# Patient Record
Sex: MALE | Race: WHITE | NOT HISPANIC OR LATINO | Employment: OTHER | URBAN - METROPOLITAN AREA
[De-identification: names, ages, dates, MRNs, and addresses within clinical notes are randomized per-mention and may not be internally consistent; named-entity substitution may affect disease eponyms.]

---

## 2017-04-28 ENCOUNTER — LAB VISIT (OUTPATIENT)
Dept: LAB | Facility: HOSPITAL | Age: 67
End: 2017-04-28
Attending: INTERNAL MEDICINE
Payer: COMMERCIAL

## 2017-04-28 DIAGNOSIS — I10 ESSENTIAL HYPERTENSION: ICD-10-CM

## 2017-04-28 LAB
ALBUMIN SERPL BCP-MCNC: 3.4 G/DL
ALP SERPL-CCNC: 100 U/L
ALT SERPL W/O P-5'-P-CCNC: 34 U/L
ANION GAP SERPL CALC-SCNC: 7 MMOL/L
AST SERPL-CCNC: 21 U/L
BILIRUB SERPL-MCNC: 0.6 MG/DL
BUN SERPL-MCNC: 19 MG/DL
CALCIUM SERPL-MCNC: 9.5 MG/DL
CHLORIDE SERPL-SCNC: 106 MMOL/L
CHOLEST/HDLC SERPL: 3.8 {RATIO}
CO2 SERPL-SCNC: 24 MMOL/L
CREAT SERPL-MCNC: 0.9 MG/DL
EST. GFR  (AFRICAN AMERICAN): >60 ML/MIN/1.73 M^2
EST. GFR  (NON AFRICAN AMERICAN): >60 ML/MIN/1.73 M^2
GLUCOSE SERPL-MCNC: 90 MG/DL
HDL/CHOLESTEROL RATIO: 26.1 %
HDLC SERPL-MCNC: 176 MG/DL
HDLC SERPL-MCNC: 46 MG/DL
LDLC SERPL CALC-MCNC: 115.4 MG/DL
NONHDLC SERPL-MCNC: 130 MG/DL
POTASSIUM SERPL-SCNC: 4.4 MMOL/L
PROT SERPL-MCNC: 6.8 G/DL
SODIUM SERPL-SCNC: 137 MMOL/L
TRIGL SERPL-MCNC: 73 MG/DL

## 2017-04-28 PROCEDURE — 80053 COMPREHEN METABOLIC PANEL: CPT

## 2017-04-28 PROCEDURE — 36415 COLL VENOUS BLD VENIPUNCTURE: CPT | Mod: PO

## 2017-04-28 PROCEDURE — 80061 LIPID PANEL: CPT

## 2017-06-06 ENCOUNTER — TELEPHONE (OUTPATIENT)
Dept: INTERNAL MEDICINE | Facility: CLINIC | Age: 67
End: 2017-06-06

## 2017-06-06 NOTE — TELEPHONE ENCOUNTER
----- Message from Kailee Montenegro sent at 6/6/2017  3:55 PM CDT -----  Contact: patient  States he want to know why he is seeing a NP and not Dr. White. Please call patient ASAP @ [phone]. Thanks, satinder

## 2017-06-06 NOTE — TELEPHONE ENCOUNTER
S/w pt. Rescheduled appt from Mrs. Mejía to Dr. White for 06/07/17. Pt was upset that he was scheduled to see Mr.s Mejía and not Dr. White. Rescheduled as requested. Verbalized understanding/TGD

## 2017-06-07 ENCOUNTER — OFFICE VISIT (OUTPATIENT)
Dept: INTERNAL MEDICINE | Facility: CLINIC | Age: 67
End: 2017-06-07
Payer: COMMERCIAL

## 2017-06-07 VITALS
TEMPERATURE: 97 F | DIASTOLIC BLOOD PRESSURE: 74 MMHG | BODY MASS INDEX: 30.25 KG/M2 | HEIGHT: 72 IN | SYSTOLIC BLOOD PRESSURE: 118 MMHG | OXYGEN SATURATION: 97 % | WEIGHT: 223.31 LBS | HEART RATE: 66 BPM

## 2017-06-07 DIAGNOSIS — Z12.11 SCREENING FOR COLON CANCER: ICD-10-CM

## 2017-06-07 DIAGNOSIS — I10 ESSENTIAL HYPERTENSION: Primary | ICD-10-CM

## 2017-06-07 DIAGNOSIS — E78.00 PURE HYPERCHOLESTEROLEMIA: ICD-10-CM

## 2017-06-07 PROCEDURE — 99213 OFFICE O/P EST LOW 20 MIN: CPT | Mod: S$GLB,,, | Performed by: INTERNAL MEDICINE

## 2017-06-07 PROCEDURE — 99999 PR PBB SHADOW E&M-EST. PATIENT-LVL III: CPT | Mod: PBBFAC,,, | Performed by: INTERNAL MEDICINE

## 2017-06-07 RX ORDER — ATORVASTATIN CALCIUM 20 MG/1
20 TABLET, FILM COATED ORAL NIGHTLY
Qty: 90 TABLET | Refills: 3 | Status: SHIPPED | OUTPATIENT
Start: 2017-06-07 | End: 2017-12-06 | Stop reason: SDUPTHER

## 2017-06-07 RX ORDER — TELMISARTAN 80 MG/1
80 TABLET ORAL DAILY
Qty: 90 TABLET | Refills: 3 | Status: SHIPPED | OUTPATIENT
Start: 2017-06-07 | End: 2017-11-01 | Stop reason: SDUPTHER

## 2017-06-07 NOTE — PROGRESS NOTES
HPI:  Patient is a 66-year-old man who comes in today for follow-up of his hypertension, lipids.  He's been doing quite well.  He reports no problems or complaints.  His blood pressures very well controlled at home    Current meds have been verified and updated per the EMR  Exam:/74   Pulse 66   Temp 97.3 °F (36.3 °C) (Tympanic)   Ht 6' (1.829 m)   Wt 101.3 kg (223 lb 5.2 oz)   SpO2 97%   BMI 30.29 kg/m²       Lab Results   Component Value Date    WBC 6.76 10/21/2016    HGB 16.3 10/21/2016    HCT 48.8 10/21/2016     10/21/2016    CHOL 176 04/28/2017    TRIG 73 04/28/2017    HDL 46 04/28/2017    ALT 34 04/28/2017    AST 21 04/28/2017     04/28/2017    K 4.4 04/28/2017     04/28/2017    CREATININE 0.9 04/28/2017    BUN 19 04/28/2017    CO2 24 04/28/2017    TSH 1.217 10/21/2016    PSA 3.8 10/21/2016       Impression:  Hypertension, lipids, both well controlled  Patient Active Problem List   Diagnosis    HTN (hypertension)    Hyperlipemia    History of colon polyps       Plan:  Orders Placed This Encounter    TSH    Comprehensive metabolic panel    Lipid panel    PSA, Screening    telmisartan (MICARDIS) 80 MG Tab    atorvastatin (LIPITOR) 20 MG tablet     Medications remain the same.  He'll be seen again in 6 months with the above lab work.

## 2017-11-01 RX ORDER — TELMISARTAN 80 MG/1
80 TABLET ORAL DAILY
Qty: 90 TABLET | Refills: 3 | Status: SHIPPED | OUTPATIENT
Start: 2017-11-01 | End: 2017-12-06 | Stop reason: SDUPTHER

## 2017-11-30 ENCOUNTER — LAB VISIT (OUTPATIENT)
Dept: LAB | Facility: HOSPITAL | Age: 67
End: 2017-11-30
Attending: INTERNAL MEDICINE
Payer: COMMERCIAL

## 2017-11-30 DIAGNOSIS — Z12.11 SCREENING FOR COLON CANCER: ICD-10-CM

## 2017-11-30 DIAGNOSIS — I10 ESSENTIAL HYPERTENSION: ICD-10-CM

## 2017-11-30 LAB
ALBUMIN SERPL BCP-MCNC: 3.5 G/DL
ALP SERPL-CCNC: 101 U/L
ALT SERPL W/O P-5'-P-CCNC: 35 U/L
ANION GAP SERPL CALC-SCNC: 8 MMOL/L
AST SERPL-CCNC: 23 U/L
BILIRUB SERPL-MCNC: 0.7 MG/DL
BUN SERPL-MCNC: 15 MG/DL
CALCIUM SERPL-MCNC: 9.7 MG/DL
CHLORIDE SERPL-SCNC: 106 MMOL/L
CHOLEST SERPL-MCNC: 195 MG/DL
CHOLEST/HDLC SERPL: 4.1 {RATIO}
CO2 SERPL-SCNC: 28 MMOL/L
COMPLEXED PSA SERPL-MCNC: 1.8 NG/ML
CREAT SERPL-MCNC: 1.1 MG/DL
EST. GFR  (AFRICAN AMERICAN): >60 ML/MIN/1.73 M^2
EST. GFR  (NON AFRICAN AMERICAN): >60 ML/MIN/1.73 M^2
GLUCOSE SERPL-MCNC: 96 MG/DL
HDLC SERPL-MCNC: 48 MG/DL
HDLC SERPL: 24.6 %
LDLC SERPL CALC-MCNC: 133.2 MG/DL
NONHDLC SERPL-MCNC: 147 MG/DL
POTASSIUM SERPL-SCNC: 4.7 MMOL/L
PROT SERPL-MCNC: 7.1 G/DL
SODIUM SERPL-SCNC: 142 MMOL/L
TRIGL SERPL-MCNC: 69 MG/DL
TSH SERPL DL<=0.005 MIU/L-ACNC: 1.09 UIU/ML

## 2017-11-30 PROCEDURE — 84153 ASSAY OF PSA TOTAL: CPT

## 2017-11-30 PROCEDURE — 80053 COMPREHEN METABOLIC PANEL: CPT

## 2017-11-30 PROCEDURE — 36415 COLL VENOUS BLD VENIPUNCTURE: CPT | Mod: PO

## 2017-11-30 PROCEDURE — 84443 ASSAY THYROID STIM HORMONE: CPT

## 2017-11-30 PROCEDURE — 80061 LIPID PANEL: CPT

## 2017-12-06 ENCOUNTER — OFFICE VISIT (OUTPATIENT)
Dept: INTERNAL MEDICINE | Facility: CLINIC | Age: 67
End: 2017-12-06
Payer: COMMERCIAL

## 2017-12-06 VITALS
HEART RATE: 60 BPM | OXYGEN SATURATION: 98 % | WEIGHT: 228.19 LBS | DIASTOLIC BLOOD PRESSURE: 76 MMHG | HEIGHT: 73 IN | TEMPERATURE: 97 F | SYSTOLIC BLOOD PRESSURE: 120 MMHG | BODY MASS INDEX: 30.24 KG/M2 | RESPIRATION RATE: 16 BRPM

## 2017-12-06 DIAGNOSIS — Z00.00 ROUTINE GENERAL MEDICAL EXAMINATION AT A HEALTH CARE FACILITY: Primary | ICD-10-CM

## 2017-12-06 DIAGNOSIS — Z86.010 HISTORY OF COLON POLYPS: ICD-10-CM

## 2017-12-06 DIAGNOSIS — E78.00 PURE HYPERCHOLESTEROLEMIA: ICD-10-CM

## 2017-12-06 DIAGNOSIS — I10 ESSENTIAL HYPERTENSION: ICD-10-CM

## 2017-12-06 PROCEDURE — 99213 OFFICE O/P EST LOW 20 MIN: CPT | Mod: PBBFAC,PO | Performed by: INTERNAL MEDICINE

## 2017-12-06 PROCEDURE — 90670 PCV13 VACCINE IM: CPT | Mod: PBBFAC,PO

## 2017-12-06 PROCEDURE — G0009 ADMIN PNEUMOCOCCAL VACCINE: HCPCS | Mod: PBBFAC,PO

## 2017-12-06 PROCEDURE — 99999 PR PBB SHADOW E&M-EST. PATIENT-LVL III: CPT | Mod: PBBFAC,,, | Performed by: INTERNAL MEDICINE

## 2017-12-06 PROCEDURE — G0008 ADMIN INFLUENZA VIRUS VAC: HCPCS | Mod: PBBFAC,PO

## 2017-12-06 PROCEDURE — 99397 PER PM REEVAL EST PAT 65+ YR: CPT | Mod: 25,S$GLB,, | Performed by: INTERNAL MEDICINE

## 2017-12-06 RX ORDER — TELMISARTAN 80 MG/1
80 TABLET ORAL DAILY
Qty: 90 TABLET | Refills: 3 | Status: SHIPPED | OUTPATIENT
Start: 2017-12-06 | End: 2018-12-07

## 2017-12-06 RX ORDER — ATORVASTATIN CALCIUM 20 MG/1
20 TABLET, FILM COATED ORAL NIGHTLY
Qty: 90 TABLET | Refills: 3 | Status: SHIPPED | OUTPATIENT
Start: 2017-12-06 | End: 2019-09-09 | Stop reason: SDUPTHER

## 2018-05-23 ENCOUNTER — PATIENT OUTREACH (OUTPATIENT)
Dept: ADMINISTRATIVE | Facility: HOSPITAL | Age: 68
End: 2018-05-23

## 2018-05-23 DIAGNOSIS — Z11.59 NEED FOR HEPATITIS C SCREENING TEST: Primary | ICD-10-CM

## 2018-05-30 ENCOUNTER — LAB VISIT (OUTPATIENT)
Dept: LAB | Facility: HOSPITAL | Age: 68
End: 2018-05-30
Attending: INTERNAL MEDICINE
Payer: MEDICARE

## 2018-05-30 DIAGNOSIS — I10 ESSENTIAL HYPERTENSION: ICD-10-CM

## 2018-05-30 DIAGNOSIS — Z11.59 NEED FOR HEPATITIS C SCREENING TEST: ICD-10-CM

## 2018-05-30 LAB
ALBUMIN SERPL BCP-MCNC: 3.6 G/DL
ALP SERPL-CCNC: 102 U/L
ALT SERPL W/O P-5'-P-CCNC: 28 U/L
ANION GAP SERPL CALC-SCNC: 7 MMOL/L
AST SERPL-CCNC: 19 U/L
BILIRUB SERPL-MCNC: 0.8 MG/DL
BUN SERPL-MCNC: 15 MG/DL
CALCIUM SERPL-MCNC: 9.7 MG/DL
CHLORIDE SERPL-SCNC: 108 MMOL/L
CHOLEST SERPL-MCNC: 196 MG/DL
CHOLEST/HDLC SERPL: 4.2 {RATIO}
CO2 SERPL-SCNC: 24 MMOL/L
CREAT SERPL-MCNC: 1 MG/DL
EST. GFR  (AFRICAN AMERICAN): >60 ML/MIN/1.73 M^2
EST. GFR  (NON AFRICAN AMERICAN): >60 ML/MIN/1.73 M^2
GLUCOSE SERPL-MCNC: 96 MG/DL
HDLC SERPL-MCNC: 47 MG/DL
HDLC SERPL: 24 %
LDLC SERPL CALC-MCNC: 128.8 MG/DL
NONHDLC SERPL-MCNC: 149 MG/DL
POTASSIUM SERPL-SCNC: 4.3 MMOL/L
PROT SERPL-MCNC: 7.2 G/DL
SODIUM SERPL-SCNC: 139 MMOL/L
TRIGL SERPL-MCNC: 101 MG/DL

## 2018-05-30 PROCEDURE — 80053 COMPREHEN METABOLIC PANEL: CPT

## 2018-05-30 PROCEDURE — 86803 HEPATITIS C AB TEST: CPT

## 2018-05-30 PROCEDURE — 80061 LIPID PANEL: CPT

## 2018-05-30 PROCEDURE — 36415 COLL VENOUS BLD VENIPUNCTURE: CPT | Mod: PO

## 2018-05-31 LAB — HCV AB SERPL QL IA: NEGATIVE

## 2018-06-06 ENCOUNTER — OFFICE VISIT (OUTPATIENT)
Dept: INTERNAL MEDICINE | Facility: CLINIC | Age: 68
End: 2018-06-06
Payer: MEDICARE

## 2018-06-06 VITALS
SYSTOLIC BLOOD PRESSURE: 146 MMHG | DIASTOLIC BLOOD PRESSURE: 70 MMHG | BODY MASS INDEX: 43.95 KG/M2 | OXYGEN SATURATION: 96 % | WEIGHT: 232.81 LBS | HEIGHT: 61 IN | TEMPERATURE: 98 F

## 2018-06-06 DIAGNOSIS — R53.83 FATIGUE, UNSPECIFIED TYPE: ICD-10-CM

## 2018-06-06 DIAGNOSIS — E78.00 PURE HYPERCHOLESTEROLEMIA: ICD-10-CM

## 2018-06-06 DIAGNOSIS — I10 ESSENTIAL HYPERTENSION: Primary | ICD-10-CM

## 2018-06-06 DIAGNOSIS — R06.02 SOB (SHORTNESS OF BREATH): ICD-10-CM

## 2018-06-06 DIAGNOSIS — Z12.11 SCREEN FOR COLON CANCER: ICD-10-CM

## 2018-06-06 DIAGNOSIS — Z12.5 SCREENING FOR PROSTATE CANCER: ICD-10-CM

## 2018-06-06 DIAGNOSIS — R53.1 WEAKNESS: ICD-10-CM

## 2018-06-06 PROCEDURE — 99214 OFFICE O/P EST MOD 30 MIN: CPT | Mod: S$GLB,,, | Performed by: NURSE PRACTITIONER

## 2018-06-06 PROCEDURE — 3078F DIAST BP <80 MM HG: CPT | Mod: CPTII,S$GLB,, | Performed by: NURSE PRACTITIONER

## 2018-06-06 PROCEDURE — 3008F BODY MASS INDEX DOCD: CPT | Mod: CPTII,S$GLB,, | Performed by: NURSE PRACTITIONER

## 2018-06-06 PROCEDURE — 99999 PR PBB SHADOW E&M-EST. PATIENT-LVL III: CPT | Mod: PBBFAC,,, | Performed by: NURSE PRACTITIONER

## 2018-06-06 PROCEDURE — 3077F SYST BP >= 140 MM HG: CPT | Mod: CPTII,S$GLB,, | Performed by: NURSE PRACTITIONER

## 2018-06-06 NOTE — PROGRESS NOTES
"Subjective:      Patient ID: Geremias Quijano is a 67 y.o. male.    Chief Complaint: No chief complaint on file.    HPI:  Patient is here for a follow up of his hypertension, hyperlipidemia.  He is taking his meds as ordered.  His only concern today is on two different occasions he felt sob unexpectedly.  His father had bypass sx in his 60's.  He is requesting a heart cath to see if he has any blockages.  Really no other symptoms.      Past Medical History:   Diagnosis Date    History of colon polyps     Next colonoscopy due November 2018    HTN (hypertension)     Hyperlipemia        Past Surgical History:   Procedure Laterality Date    COLONOSCOPY N/A 11/13/2015    Procedure: COLONOSCOPY;  Surgeon: Layo Hernandez III, MD;  Location: [facility];  Service: Endoscopy;  Laterality: N/A;    NASAL SEPTUM SURGERY         Lab Results   Component Value Date    WBC 6.76 10/21/2016    HGB 16.3 10/21/2016    HCT 48.8 10/21/2016     10/21/2016    CHOL 196 05/30/2018    TRIG 101 05/30/2018    HDL 47 05/30/2018    ALT 28 05/30/2018    AST 19 05/30/2018     05/30/2018    K 4.3 05/30/2018     05/30/2018    CREATININE 1.0 05/30/2018    BUN 15 05/30/2018    CO2 24 05/30/2018    TSH 1.095 11/30/2017    PSA 1.8 11/30/2017       BP (!) 146/70 (BP Location: Right arm)   Temp 97.7 °F (36.5 °C) (Tympanic)   Ht 5' 1" (1.549 m)   Wt 105.6 kg (232 lb 12.9 oz)   SpO2 96%   BMI 43.99 kg/m²       Review of Systems   Constitutional: Negative for appetite change, chills, diaphoresis and fever.   HENT: Negative for congestion, ear pain, postnasal drip, rhinorrhea, sneezing, sore throat and trouble swallowing.    Eyes: Negative for photophobia, pain and visual disturbance.   Respiratory: Negative for apnea, cough, choking, chest tightness, shortness of breath and wheezing.    Cardiovascular: Negative for chest pain, palpitations and leg swelling.   Gastrointestinal: Negative for abdominal pain, constipation, diarrhea, " nausea and vomiting.   Genitourinary: Negative for decreased urine volume, difficulty urinating, dysuria, hematuria and urgency.   Musculoskeletal: Negative for arthralgias, gait problem, joint swelling and myalgias.   Skin: Negative for rash.   Neurological: Negative for dizziness, tremors, seizures, syncope, weakness, light-headedness, numbness and headaches.   Psychiatric/Behavioral: Negative for agitation, confusion, decreased concentration, hallucinations and sleep disturbance. The patient is not nervous/anxious.       Objective:     Physical Exam   Constitutional: He is oriented to person, place, and time. He appears well-developed and well-nourished. No distress.   Cardiovascular: Normal rate, regular rhythm and normal heart sounds.    Pulmonary/Chest: Effort normal and breath sounds normal.   Musculoskeletal:   Normal gait   Neurological: He is alert and oriented to person, place, and time.   Skin: Skin is warm and dry.   Psychiatric: He has a normal mood and affect. His behavior is normal.     Assessment:      1. Essential hypertension    2. Screen for colon cancer    3. SOB (shortness of breath)    4. Weakness    5. Pure hypercholesterolemia    6. Screening for prostate cancer    7. Fatigue, unspecified type      Plan:   Essential hypertension  -     Comprehensive metabolic panel; Future; Expected date: 06/06/2018    Screen for colon cancer  -     Case request GI: COLONOSCOPY    SOB (shortness of breath)  -     Cardiac treadmill stress test; Future  -     2D Echo w/ Color Flow Doppler; Future    Weakness  -     Cardiac treadmill stress test; Future  -     2D Echo w/ Color Flow Doppler; Future    Pure hypercholesterolemia  -     Lipid panel; Future; Expected date: 06/06/2018  -     TSH; Future; Expected date: 06/06/2018    Screening for prostate cancer  -     PSA, Screening; Future; Expected date: 06/06/2018    Fatigue, unspecified type  -     CBC auto differential; Future; Expected date: 06/06/2018    he  is due for a repeat colonoscopy, will see dr guzman in 6 months for labs and a physical.  Will have a stress test and echo with +family hx and mild s/s per request      Current Outpatient Prescriptions:     atorvastatin (LIPITOR) 20 MG tablet, Take 1 tablet (20 mg total) by mouth every evening., Disp: 90 tablet, Rfl: 3    telmisartan (MICARDIS) 80 MG Tab, Take 1 tablet (80 mg total) by mouth once daily., Disp: 90 tablet, Rfl: 3

## 2018-06-08 RX ORDER — SODIUM, POTASSIUM,MAG SULFATES 17.5-3.13G
SOLUTION, RECONSTITUTED, ORAL ORAL
Qty: 354 ML | Refills: 0 | Status: SHIPPED | OUTPATIENT
Start: 2018-06-08 | End: 2018-08-01

## 2018-06-11 ENCOUNTER — TELEPHONE (OUTPATIENT)
Dept: CARDIOLOGY | Facility: CLINIC | Age: 68
End: 2018-06-11

## 2018-06-11 ENCOUNTER — CLINICAL SUPPORT (OUTPATIENT)
Dept: INTERNAL MEDICINE | Facility: CLINIC | Age: 68
End: 2018-06-11
Payer: MEDICARE

## 2018-06-11 ENCOUNTER — CLINICAL SUPPORT (OUTPATIENT)
Dept: CARDIOLOGY | Facility: CLINIC | Age: 68
End: 2018-06-11
Payer: MEDICARE

## 2018-06-11 VITALS — HEIGHT: 61 IN | BODY MASS INDEX: 43.79 KG/M2 | WEIGHT: 231.94 LBS

## 2018-06-11 DIAGNOSIS — R06.02 SOB (SHORTNESS OF BREATH): ICD-10-CM

## 2018-06-11 DIAGNOSIS — R06.02 SOB (SHORTNESS OF BREATH): Primary | ICD-10-CM

## 2018-06-11 PROCEDURE — 93005 ELECTROCARDIOGRAM TRACING: CPT | Mod: S$GLB,,, | Performed by: NURSE PRACTITIONER

## 2018-06-11 PROCEDURE — 93010 ELECTROCARDIOGRAM REPORT: CPT | Mod: S$GLB,,, | Performed by: INTERNAL MEDICINE

## 2018-06-11 PROCEDURE — 99999 PR PBB SHADOW E&M-EST. PATIENT-LVL I: CPT | Mod: PBBFAC,,,

## 2018-06-11 NOTE — TELEPHONE ENCOUNTER
Cardiology recommends a baseline 12 lead EKG for Mr. Quijano from which to compare Prior to his stress test. If you agree please place the order and schedule. Thank you!

## 2018-06-11 NOTE — TELEPHONE ENCOUNTER
Cardiology is requesting an EKG before his stress test.  Please have him come in for a nurse visit for an EKG   Order is in  thanks

## 2018-06-13 ENCOUNTER — CLINICAL SUPPORT (OUTPATIENT)
Dept: CARDIOLOGY | Facility: CLINIC | Age: 68
End: 2018-06-13
Payer: MEDICARE

## 2018-06-13 DIAGNOSIS — R53.1 WEAKNESS: ICD-10-CM

## 2018-06-13 DIAGNOSIS — R06.02 SOB (SHORTNESS OF BREATH): ICD-10-CM

## 2018-06-13 LAB
DIASTOLIC DYSFUNCTION: NO
DIASTOLIC DYSFUNCTION: NO
ESTIMATED PA SYSTOLIC PRESSURE: 18.84
RETIRED EF AND QEF - SEE NOTES: 65 (ref 55–65)

## 2018-06-13 PROCEDURE — 93306 TTE W/DOPPLER COMPLETE: CPT | Mod: S$GLB,,, | Performed by: NUCLEAR MEDICINE

## 2018-06-13 PROCEDURE — 93015 CV STRESS TEST SUPVJ I&R: CPT | Mod: S$GLB,,, | Performed by: INTERNAL MEDICINE

## 2018-06-14 ENCOUNTER — TELEPHONE (OUTPATIENT)
Dept: INTERNAL MEDICINE | Facility: CLINIC | Age: 68
End: 2018-06-14

## 2018-07-19 ENCOUNTER — PATIENT MESSAGE (OUTPATIENT)
Dept: ENDOSCOPY | Facility: HOSPITAL | Age: 68
End: 2018-07-19

## 2018-08-01 ENCOUNTER — PATIENT MESSAGE (OUTPATIENT)
Dept: INTERNAL MEDICINE | Facility: CLINIC | Age: 68
End: 2018-08-01

## 2018-08-01 ENCOUNTER — PATIENT MESSAGE (OUTPATIENT)
Dept: ENDOSCOPY | Facility: HOSPITAL | Age: 68
End: 2018-08-01

## 2018-08-01 RX ORDER — POLYETHYLENE GLYCOL 3350, SODIUM SULFATE ANHYDROUS, SODIUM BICARBONATE, SODIUM CHLORIDE, POTASSIUM CHLORIDE 236; 22.74; 6.74; 5.86; 2.97 G/4L; G/4L; G/4L; G/4L; G/4L
4 POWDER, FOR SOLUTION ORAL ONCE
Qty: 4000 ML | Refills: 0 | Status: SHIPPED | OUTPATIENT
Start: 2018-08-01 | End: 2018-08-01

## 2018-08-01 NOTE — TELEPHONE ENCOUNTER
This message needs to be sent to the GI department for their approval as they will be doing the colonoscopy

## 2018-08-02 ENCOUNTER — PATIENT MESSAGE (OUTPATIENT)
Dept: GASTROENTEROLOGY | Facility: CLINIC | Age: 68
End: 2018-08-02

## 2018-08-02 RX ORDER — POLYETHYLENE GLYCOL 3350, SODIUM SULFATE ANHYDROUS, SODIUM BICARBONATE, SODIUM CHLORIDE, POTASSIUM CHLORIDE 236; 22.74; 6.74; 5.86; 2.97 G/4L; G/4L; G/4L; G/4L; G/4L
4 POWDER, FOR SOLUTION ORAL ONCE
Qty: 4000 ML | Refills: 0 | Status: SHIPPED | OUTPATIENT
Start: 2018-08-02 | End: 2018-08-02

## 2018-08-15 RX ORDER — ATORVASTATIN CALCIUM 20 MG/1
TABLET, FILM COATED ORAL
Qty: 90 TABLET | Refills: 3 | Status: ON HOLD | OUTPATIENT
Start: 2018-08-15 | End: 2018-08-29 | Stop reason: HOSPADM

## 2018-08-29 ENCOUNTER — ANESTHESIA EVENT (OUTPATIENT)
Dept: ENDOSCOPY | Facility: HOSPITAL | Age: 68
End: 2018-08-29
Payer: MEDICARE

## 2018-08-29 ENCOUNTER — HOSPITAL ENCOUNTER (OUTPATIENT)
Facility: HOSPITAL | Age: 68
Discharge: HOME | End: 2018-08-29
Attending: INTERNAL MEDICINE | Admitting: INTERNAL MEDICINE
Payer: MEDICARE

## 2018-08-29 ENCOUNTER — ANESTHESIA (OUTPATIENT)
Dept: ENDOSCOPY | Facility: HOSPITAL | Age: 68
End: 2018-08-29
Payer: MEDICARE

## 2018-08-29 DIAGNOSIS — K63.5 POLYP OF SIGMOID COLON, UNSPECIFIED TYPE: Primary | ICD-10-CM

## 2018-08-29 DIAGNOSIS — K57.30 DIVERTICULOSIS OF LARGE INTESTINE WITHOUT HEMORRHAGE: ICD-10-CM

## 2018-08-29 DIAGNOSIS — Z86.010 HX OF ADENOMATOUS COLONIC POLYPS: ICD-10-CM

## 2018-08-29 PROBLEM — Z86.0101 HX OF ADENOMATOUS COLONIC POLYPS: Status: ACTIVE | Noted: 2018-08-29

## 2018-08-29 PROCEDURE — 88305 TISSUE EXAM BY PATHOLOGIST: CPT | Performed by: PATHOLOGY

## 2018-08-29 PROCEDURE — 88305 TISSUE EXAM BY PATHOLOGIST: CPT | Mod: 26,,, | Performed by: PATHOLOGY

## 2018-08-29 PROCEDURE — 45380 COLONOSCOPY AND BIOPSY: CPT | Performed by: INTERNAL MEDICINE

## 2018-08-29 PROCEDURE — 27201012 HC FORCEPS, HOT/COLD, DISP: Performed by: INTERNAL MEDICINE

## 2018-08-29 PROCEDURE — 25000003 PHARM REV CODE 250: Performed by: NURSE ANESTHETIST, CERTIFIED REGISTERED

## 2018-08-29 PROCEDURE — 37000008 HC ANESTHESIA 1ST 15 MINUTES: Performed by: INTERNAL MEDICINE

## 2018-08-29 PROCEDURE — 25000003 PHARM REV CODE 250: Performed by: INTERNAL MEDICINE

## 2018-08-29 PROCEDURE — 37000009 HC ANESTHESIA EA ADD 15 MINS: Performed by: INTERNAL MEDICINE

## 2018-08-29 PROCEDURE — 45380 COLONOSCOPY AND BIOPSY: CPT | Mod: PT,,, | Performed by: INTERNAL MEDICINE

## 2018-08-29 PROCEDURE — 63600175 PHARM REV CODE 636 W HCPCS: Performed by: NURSE ANESTHETIST, CERTIFIED REGISTERED

## 2018-08-29 RX ORDER — LIDOCAINE HYDROCHLORIDE 20 MG/ML
INJECTION, SOLUTION EPIDURAL; INFILTRATION; INTRACAUDAL; PERINEURAL
Status: DISCONTINUED | OUTPATIENT
Start: 2018-08-29 | End: 2018-08-29

## 2018-08-29 RX ORDER — SODIUM CHLORIDE, SODIUM LACTATE, POTASSIUM CHLORIDE, CALCIUM CHLORIDE 600; 310; 30; 20 MG/100ML; MG/100ML; MG/100ML; MG/100ML
INJECTION, SOLUTION INTRAVENOUS CONTINUOUS
Status: DISCONTINUED | OUTPATIENT
Start: 2018-08-29 | End: 2018-08-29 | Stop reason: HOSPADM

## 2018-08-29 RX ORDER — PROPOFOL 10 MG/ML
VIAL (ML) INTRAVENOUS
Status: DISCONTINUED | OUTPATIENT
Start: 2018-08-29 | End: 2018-08-29

## 2018-08-29 RX ORDER — SODIUM CHLORIDE 0.9 % (FLUSH) 0.9 %
3 SYRINGE (ML) INJECTION
Status: DISCONTINUED | OUTPATIENT
Start: 2018-08-29 | End: 2018-08-29 | Stop reason: HOSPADM

## 2018-08-29 RX ADMIN — PROPOFOL 30 MG: 10 INJECTION, EMULSION INTRAVENOUS at 07:08

## 2018-08-29 RX ADMIN — PROPOFOL 100 MG: 10 INJECTION, EMULSION INTRAVENOUS at 07:08

## 2018-08-29 RX ADMIN — PROPOFOL 150 MG: 10 INJECTION, EMULSION INTRAVENOUS at 07:08

## 2018-08-29 RX ADMIN — LIDOCAINE HYDROCHLORIDE 40 MG: 20 INJECTION, SOLUTION EPIDURAL; INFILTRATION; INTRACAUDAL; PERINEURAL at 07:08

## 2018-08-29 RX ADMIN — SODIUM CHLORIDE, SODIUM LACTATE, POTASSIUM CHLORIDE, AND CALCIUM CHLORIDE: .6; .31; .03; .02 INJECTION, SOLUTION INTRAVENOUS at 07:08

## 2018-08-29 NOTE — DISCHARGE INSTRUCTIONS

## 2018-08-29 NOTE — H&P
Short Stay Endoscopy History and Physical    PCP - Martínez White MD    Procedure - Colonoscopy  ASA - 2  Mallampati - per anesthesia  History of Anesthesia problems - no  Family history Anesthesia problems -  no     HPI:  This is a 67 y.o.male here for evaluation of :Hx of Colon Polyps     Reflux - no  Dysphagia - no  Abdominal pain - no  Diarrhea - no  Anemia - no  GI bleeding - no  Nausea and vomiting-no  Early satiety-no  aversion to sight or smell of food-no    ROS:  Constitutional: No fevers, chills, No weight loss  ENT: No allergies  CV: No chest pain  Pulm: No cough, No shortness of breath  Ophtho: No vision changes  GI: see HPI  Derm: No rash  Heme: No lymphadenopathy, No bruising  MSK: No arthritis  : No dysuria, No hematuria  Endo: No hot or cold intolerance  Neuro: No syncope, No seizure  Psych: No anxiety, No depression    Medical History:  Past Medical History:   Diagnosis Date    History of colon polyps     Next colonoscopy due November 2018    HTN (hypertension)     Hyperlipemia        Surgical History:  Past Surgical History:   Procedure Laterality Date    NASAL SEPTUM SURGERY         Family History:History reviewed. No pertinent family history.    Social History:  Social History     Socioeconomic History    Marital status:      Spouse name: Not on file    Number of children: Not on file    Years of education: Not on file    Highest education level: Not on file   Social Needs    Financial resource strain: Not on file    Food insecurity - worry: Not on file    Food insecurity - inability: Not on file    Transportation needs - medical: Not on file    Transportation needs - non-medical: Not on file   Occupational History    Not on file   Tobacco Use    Smoking status: Never Smoker    Smokeless tobacco: Never Used   Substance and Sexual Activity    Alcohol use: Yes    Drug use: No    Sexual activity: Yes     Partners: Female   Other Topics Concern    Not on file   Social  History Narrative    Not on file       Allergies: Review of patient's allergies indicates:  No Known Allergies    Medications:   No current facility-administered medications on file prior to encounter.      Current Outpatient Medications on File Prior to Encounter   Medication Sig Dispense Refill    atorvastatin (LIPITOR) 20 MG tablet Take 1 tablet (20 mg total) by mouth every evening. 90 tablet 3    telmisartan (MICARDIS) 80 MG Tab Take 1 tablet (80 mg total) by mouth once daily. 90 tablet 3       Objective Findings:    Vital Signs:There were no vitals filed for this visit.        Physical Exam:  General Appearance: Well appearing in no acute distress  Eyes:    No scleral icterus  ENT: Neck supple, Lips, mucosa, and tongue normal; teeth and gums normal  Lungs: CTA bilaterally in anterior and posterior fields, no wheezes, no crackles.  Heart:  Regular rate, S1, S2 normal, no murmurs heard.  Abdomen: Soft, non tender, non distended with normal bowel sounds. No hepatosplenomegaly, ascites, or mass.  Extremities: No clubbing, cyanosis or edema  Skin: No rash    Labs:  Reviewed    Plan: Colonoscopy  I have explained the risks and benefits of endoscopy procedures to the patient including but not limited to bleeding, perforation, infection, and death. The patient wishes to proceed.

## 2018-08-29 NOTE — ANESTHESIA PREPROCEDURE EVALUATION
08/29/2018  Geremias Quijano is a 67 y.o., male.    Anesthesia Evaluation    I have reviewed the Patient Summary Reports.    I have reviewed the Nursing Notes.   I have reviewed the Medications.     Review of Systems  Anesthesia Hx:  Denies Family Hx of Anesthesia complications.   Denies Personal Hx of Anesthesia complications.   Social:  Smoker    Cardiovascular:   Exercise tolerance: good Hypertension, well controlled Denies MI.  Denies CAD.    Denies Dysrhythmias.  hyperlipidemia    Pulmonary:  Pulmonary Normal    Renal/:  Renal/ Normal     Hepatic/GI:  Hepatic/GI Normal Bowel Prep. Last prep    Musculoskeletal:  Musculoskeletal Normal    Neurological:  Neurology Normal        Physical Exam  General:  Well nourished    Airway/Jaw/Neck:  Airway Findings: Mouth Opening: Normal Mallampati: III      Dental:  Dental Findings: In tact   Chest/Lungs:  Chest/Lungs Findings: Clear to auscultation     Heart/Vascular:  Heart Findings: Rate: Normal  Rhythm: Regular Rhythm  Sounds: Normal             Anesthesia Plan  Type of Anesthesia, risks & benefits discussed:  Anesthesia Type:  MAC  Patient's Preference:   Intra-op Monitoring Plan:   Intra-op Monitoring Plan Comments:   Post Op Pain Control Plan:   Post Op Pain Control Plan Comments:   Induction:    Beta Blocker:  Patient is not currently on a Beta-Blocker (No further documentation required).       Informed Consent: Patient understands risks and agrees with Anesthesia plan.  Questions answered. Anesthesia consent signed with patient.  ASA Score: 2     Day of Surgery Review of History & Physical: I have interviewed and examined the patient. I have reviewed the patient's H&P dated:  There are no significant changes.          Ready For Surgery From Anesthesia Perspective.

## 2018-08-29 NOTE — [employer] PATIENT INSTRUCTIONS
Discharge Summary/Instructions after an Endoscopic Procedure  Patient Name: Geremias Quijano  Patient MRN: [id number]  Patient YOB: 1950  Wednesday, August 29, 2018 Layo Hernandez III, MD  RESTRICTIONS:  During your procedure today, you received medications for sedation.  These   medications may affect your judgment, balance and coordination.  Therefore,   for 24 hours, you have the following restrictions:   - DO NOT drive a car, operate machinery, make legal/financial decisions,   sign important papers or drink alcohol.    ACTIVITY:  Today: no heavy lifting, straining or running due to procedural   sedation/anesthesia.  The following day: return to full activity including work.  DIET:  Eat and drink normally unless instructed otherwise.     TREATMENT FOR COMMON SIDE EFFECTS:  - Mild abdominal pain, nausea, belching, bloating or excessive gas:  rest,   eat lightly and use a heating pad.  - Sore Throat: treat with throat lozenges and/or gargle with warm salt   water.  - Because air was used during the procedure, expelling large amounts of air   from your rectum or belching is normal.  - If a bowel prep was taken, you may not have a bowel movement for 1-3 days.    This is normal.  SYMPTOMS TO WATCH FOR AND REPORT TO YOUR PHYSICIAN:  1. Abdominal pain or bloating, other than gas cramps.  2. Chest pain.  3. Back pain.  4. Signs of infection such as: chills or fever occurring within 24 hours   after the procedure.  5. Rectal bleeding, which would show as bright red, maroon, or black stools.   (A tablespoon of blood from the rectum is not serious, especially if   hemorrhoids are present.)  6. Vomiting.  7. Weakness or dizziness.  GO DIRECTLY TO THE NEAREST EMERGENCY ROOM IF YOU HAVE ANY OF THE FOLLOWING:      Difficulty breathing              Chills and/or fever over 101 F   Persistent vomiting and/or vomiting blood   Severe abdominal pain   Severe chest pain   Black, tarry stools   Bleeding- more than one  tablespoon   Any other symptom or condition that you feel may need urgent attention  Your doctor recommends these additional instructions:  If any biopsies were taken, your doctors clinic will contact you in 1 to 2   weeks with any results.  - Discharge patient to home (via wheelchair).   - High fiber diet.   - Continue present medications.   - Await pathology results.   - Repeat colonoscopy in 5 years for surveillance.   - Return to primary care physician as previously scheduled.   - Discharge patient to home (via wheelchair).   - High fiber diet.   - Continue present medications.   - Await pathology results.   - Repeat colonoscopy in 5 years for surveillance.   - Return to primary care physician as previously scheduled.  For questions, problems or results please call your physician Layo Hernandez III, MD at Work:  [phone]  If you have any questions about the above instructions, call the GI   department at [phone] or call the endoscopy unit at [phone]   from 7am until 3 pm.  [facility] [place], EMERGENCY ROOM PHONE NUMBER:   [phone]  IF A COMPLICATION OR EMERGENCY SITUATION ARISES AND YOU ARE UNABLE TO REACH   YOUR PHYSICIAN - GO DIRECTLY TO THE EMERGENCY ROOM.  I have read or have had read to me these discharge instructions for my   procedure and have received a written copy.  I understand these   instructions and will follow-up with my physician if I have any questions.     __________________________________       _____________________________________  Nurse Signature                                          Patient/Designated   Responsible Party Signature  Layo Hernandez III, MD  8/29/2018 7:39:07 AM  This report has been verified and signed electronically.  [employer]

## 2018-08-29 NOTE — PLAN OF CARE
See anesthesia for meds, vs, and monitoring.Pt adequately sedated per all staff in procedure room. Final timeout done

## 2018-08-29 NOTE — PLAN OF CARE
Dr Hernandez came to bedside and discussed findings. NO N/V,  no abdominal pain, no GI bleeding, and vitals stable.  Pt discharged from unit.

## 2018-08-29 NOTE — INTERVAL H&P NOTE
The patient has been examined and the H&P has been reviewed:I have reviewed this note and I agree with this assessment. The patient remains stable for endoscopy at the time of this present evaluation.         Anesthesia/Surgery risks, benefits and alternative options discussed and understood by patient/family.          Active Hospital Problems    Diagnosis  POA    Hx of adenomatous colonic polyps [Z86.010]  Not Applicable      Resolved Hospital Problems   No resolved problems to display.

## 2018-08-29 NOTE — TRANSFER OF CARE
Anesthesia Transfer of Care Note    Patient: Geremias Quijano    Procedure(s) Performed: Procedure(s) (LRB):  COLONOSCOPY (N/A)    Patient location: PACU    Anesthesia Type: MAC    Transport from OR: Transported from OR on room air with adequate spontaneous ventilation    Post pain: adequate analgesia    Post assessment: no apparent anesthetic complications    Post vital signs: stable    Level of consciousness: awake and responds to stimulation    Nausea/Vomiting: no nausea/vomiting    Complications: none    Transfer of care protocol was followed      Last vitals:   Visit Vitals  BP 95/62   Pulse 68   Temp 36.5 °C (97.7 °F) (Oral)   Resp 14   Ht 6' (1.829 m)   Wt 100.2 kg (221 lb)   SpO2 95%   BMI 29.97 kg/m²

## 2018-08-29 NOTE — DISCHARGE SUMMARY
[facility] -   Brief Operative Note     SUMMARY     Surgery Date: 8/29/2018     Surgeon(s) and Role:     * Layo Hernandez III, MD - Primary    Assisting Surgeon: None    Pre-op Diagnosis:  Screen for colon cancer [Z12.11]    Post-op Diagnosis:  Post-Op Diagnosis Codes:     * Screen for colon cancer [Z12.11]     - Colon polyps     - Diverticulosis  Procedure(s) (LRB):  COLONOSCOPY (N/A)    Anesthesia: Choice    Description of the findings of the procedure: Procedures completed. See Procedure note for full details.    Findings/Key Components: Procedures completed. See Procedure note for full details.    Prosthetics/Devices: None    Estimated Blood Loss: * No values recorded between 8/29/2018 12:00 AM and 8/29/2018  7:42 AM *         Specimens:   Specimen (12h ago, onward)    Start     Ordered    08/29/18 0729  Specimen to Pathology - Surgery  Once     Comments:  Jar 1 Sigmoid polyps     Start Status   08/29/18 0729 Collected (08/29/18 0734)       08/29/18 0729          Discharge Note    SUMMARY     Admit Date: 8/29/2018    Discharge Date and Time: 8/29/2018    Hospital Course (synopsis of major diagnoses, care, treatment, and services provided during the course of the hospital stay):  Procedures completed. See Procedure note for full details. Discharge patient when discharge criteria met.    Final Diagnosis: Post-Op Diagnosis Codes:     * Screen for colon cancer [Z12.11]     - Colon polyps     - Diverticulosis  Disposition: Discharge patient when discharge criteria met.    Follow Up/Patient Instructions:       Medications:  Reconciled Home Medications:   Current Discharge Medication List      CONTINUE these medications which have NOT CHANGED    Details   atorvastatin (LIPITOR) 20 MG tablet Take 1 tablet (20 mg total) by mouth every evening.  Qty: 90 tablet, Refills: 3      telmisartan (MICARDIS) 80 MG Tab Take 1 tablet (80 mg total) by mouth once daily.  Qty: 90 tablet, Refills: 3            Discharge  Procedure Orders   Diet general     Activity as tolerated

## 2018-08-29 NOTE — ANESTHESIA POSTPROCEDURE EVALUATION
Anesthesia Post Evaluation    Patient: Geremias Quijano    Procedure(s) Performed: Procedure(s) (LRB):  COLONOSCOPY (N/A)    Final Anesthesia Type: MAC  Patient location during evaluation: PACU  Patient participation: Yes- Able to Participate  Level of consciousness: awake and alert and oriented  Post-procedure vital signs: reviewed and stable  Pain management: adequate  Airway patency: patent  PONV status at discharge: No PONV  Anesthetic complications: no      Cardiovascular status: blood pressure returned to baseline, hemodynamically stable and stable  Respiratory status: unassisted, spontaneous ventilation and room air  Hydration status: euvolemic  Follow-up not needed.        Visit Vitals  BP 95/62   Pulse 68   Temp 36.5 °C (97.7 °F) (Oral)   Resp 14   Ht 6' (1.829 m)   Wt 100.2 kg (221 lb)   SpO2 95%   BMI 29.97 kg/m²       Pain/Jeremías Score: Pain Assessment Performed: Yes (8/29/2018  7:34 AM)  Presence of Pain: denies (8/29/2018  7:34 AM)

## 2018-08-30 VITALS
OXYGEN SATURATION: 97 % | HEIGHT: 72 IN | RESPIRATION RATE: 17 BRPM | WEIGHT: 221 LBS | SYSTOLIC BLOOD PRESSURE: 132 MMHG | TEMPERATURE: 98 F | BODY MASS INDEX: 29.93 KG/M2 | HEART RATE: 72 BPM | DIASTOLIC BLOOD PRESSURE: 74 MMHG

## 2018-08-31 ENCOUNTER — PATIENT MESSAGE (OUTPATIENT)
Dept: GASTROENTEROLOGY | Facility: CLINIC | Age: 68
End: 2018-08-31

## 2018-11-06 RX ORDER — TELMISARTAN 80 MG/1
TABLET ORAL
Qty: 90 TABLET | Refills: 3 | Status: SHIPPED | OUTPATIENT
Start: 2018-11-06 | End: 2018-11-06 | Stop reason: SDUPTHER

## 2018-11-06 RX ORDER — TELMISARTAN 80 MG/1
80 TABLET ORAL DAILY
Qty: 90 TABLET | Refills: 3 | Status: SHIPPED | OUTPATIENT
Start: 2018-11-06 | End: 2019-11-15 | Stop reason: SDUPTHER

## 2018-11-30 ENCOUNTER — LAB VISIT (OUTPATIENT)
Dept: LAB | Facility: HOSPITAL | Age: 68
End: 2018-11-30
Attending: INTERNAL MEDICINE
Payer: MEDICARE

## 2018-11-30 DIAGNOSIS — E78.00 PURE HYPERCHOLESTEROLEMIA: ICD-10-CM

## 2018-11-30 DIAGNOSIS — I10 ESSENTIAL HYPERTENSION: ICD-10-CM

## 2018-11-30 DIAGNOSIS — R53.83 FATIGUE, UNSPECIFIED TYPE: ICD-10-CM

## 2018-11-30 DIAGNOSIS — Z12.5 SCREENING FOR PROSTATE CANCER: ICD-10-CM

## 2018-11-30 LAB
ALBUMIN SERPL BCP-MCNC: 3.6 G/DL
ALP SERPL-CCNC: 113 U/L
ALT SERPL W/O P-5'-P-CCNC: 29 U/L
ANION GAP SERPL CALC-SCNC: 7 MMOL/L
AST SERPL-CCNC: 19 U/L
BASOPHILS # BLD AUTO: 0.08 K/UL
BASOPHILS NFR BLD: 1.3 %
BILIRUB SERPL-MCNC: 0.6 MG/DL
BUN SERPL-MCNC: 18 MG/DL
CALCIUM SERPL-MCNC: 9.9 MG/DL
CHLORIDE SERPL-SCNC: 104 MMOL/L
CHOLEST SERPL-MCNC: 199 MG/DL
CHOLEST/HDLC SERPL: 4.1 {RATIO}
CO2 SERPL-SCNC: 27 MMOL/L
COMPLEXED PSA SERPL-MCNC: 3.5 NG/ML
CREAT SERPL-MCNC: 0.9 MG/DL
DIFFERENTIAL METHOD: NORMAL
EOSINOPHIL # BLD AUTO: 0.2 K/UL
EOSINOPHIL NFR BLD: 3.6 %
ERYTHROCYTE [DISTWIDTH] IN BLOOD BY AUTOMATED COUNT: 12.3 %
EST. GFR  (AFRICAN AMERICAN): >60 ML/MIN/1.73 M^2
EST. GFR  (NON AFRICAN AMERICAN): >60 ML/MIN/1.73 M^2
GLUCOSE SERPL-MCNC: 95 MG/DL
HCT VFR BLD AUTO: 49.7 %
HDLC SERPL-MCNC: 49 MG/DL
HDLC SERPL: 24.6 %
HGB BLD-MCNC: 16.2 G/DL
IMM GRANULOCYTES # BLD AUTO: 0.02 K/UL
IMM GRANULOCYTES NFR BLD AUTO: 0.3 %
LDLC SERPL CALC-MCNC: 130.4 MG/DL
LYMPHOCYTES # BLD AUTO: 1.4 K/UL
LYMPHOCYTES NFR BLD: 23.3 %
MCH RBC QN AUTO: 28.5 PG
MCHC RBC AUTO-ENTMCNC: 32.6 G/DL
MCV RBC AUTO: 87 FL
MONOCYTES # BLD AUTO: 0.5 K/UL
MONOCYTES NFR BLD: 8.7 %
NEUTROPHILS # BLD AUTO: 3.9 K/UL
NEUTROPHILS NFR BLD: 62.8 %
NONHDLC SERPL-MCNC: 150 MG/DL
NRBC BLD-RTO: 0 /100 WBC
PLATELET # BLD AUTO: 284 K/UL
PMV BLD AUTO: 10.6 FL
POTASSIUM SERPL-SCNC: 4.5 MMOL/L
PROT SERPL-MCNC: 7.4 G/DL
RBC # BLD AUTO: 5.69 M/UL
SODIUM SERPL-SCNC: 138 MMOL/L
TRIGL SERPL-MCNC: 98 MG/DL
TSH SERPL DL<=0.005 MIU/L-ACNC: 1.43 UIU/ML
WBC # BLD AUTO: 6.19 K/UL

## 2018-11-30 PROCEDURE — 80053 COMPREHEN METABOLIC PANEL: CPT | Mod: HCNC

## 2018-11-30 PROCEDURE — 80061 LIPID PANEL: CPT | Mod: HCNC

## 2018-11-30 PROCEDURE — 84153 ASSAY OF PSA TOTAL: CPT | Mod: HCNC

## 2018-11-30 PROCEDURE — 85025 COMPLETE CBC W/AUTO DIFF WBC: CPT | Mod: HCNC

## 2018-11-30 PROCEDURE — 36415 COLL VENOUS BLD VENIPUNCTURE: CPT | Mod: HCNC,PO

## 2018-11-30 PROCEDURE — 84443 ASSAY THYROID STIM HORMONE: CPT | Mod: HCNC

## 2018-12-07 ENCOUNTER — OFFICE VISIT (OUTPATIENT)
Dept: INTERNAL MEDICINE | Facility: CLINIC | Age: 68
End: 2018-12-07
Payer: MEDICARE

## 2018-12-07 VITALS
BODY MASS INDEX: 32.85 KG/M2 | RESPIRATION RATE: 18 BRPM | TEMPERATURE: 97 F | OXYGEN SATURATION: 95 % | SYSTOLIC BLOOD PRESSURE: 154 MMHG | WEIGHT: 242.5 LBS | HEIGHT: 72 IN | DIASTOLIC BLOOD PRESSURE: 72 MMHG | HEART RATE: 90 BPM

## 2018-12-07 DIAGNOSIS — E78.00 PURE HYPERCHOLESTEROLEMIA: ICD-10-CM

## 2018-12-07 DIAGNOSIS — Z00.00 ROUTINE GENERAL MEDICAL EXAMINATION AT A HEALTH CARE FACILITY: Primary | ICD-10-CM

## 2018-12-07 DIAGNOSIS — Z86.010 HISTORY OF COLON POLYPS: ICD-10-CM

## 2018-12-07 DIAGNOSIS — I10 ESSENTIAL HYPERTENSION: ICD-10-CM

## 2018-12-07 PROCEDURE — 99397 PER PM REEVAL EST PAT 65+ YR: CPT | Mod: 25,HCNC,S$GLB, | Performed by: INTERNAL MEDICINE

## 2018-12-07 PROCEDURE — 3078F DIAST BP <80 MM HG: CPT | Mod: CPTII,HCNC,S$GLB, | Performed by: INTERNAL MEDICINE

## 2018-12-07 PROCEDURE — 99999 PR PBB SHADOW E&M-EST. PATIENT-LVL III: CPT | Mod: PBBFAC,HCNC,, | Performed by: INTERNAL MEDICINE

## 2018-12-07 PROCEDURE — 3077F SYST BP >= 140 MM HG: CPT | Mod: CPTII,HCNC,S$GLB, | Performed by: INTERNAL MEDICINE

## 2018-12-07 RX ORDER — MELOXICAM 15 MG/1
TABLET ORAL
COMMUNITY
Start: 2018-12-03 | End: 2019-06-05

## 2018-12-07 NOTE — PROGRESS NOTES
HPI:  Patient is a 68-year-old man who comes today for follow-up of hypertension, lipids and for his annual physical.  He has not been recently checking his blood pressure.  Patient denies any new problems or complaints.      Current MEDS: medcard review, verified and update  Allergies: Per the electronic medical record    Past Medical History:   Diagnosis Date    History of colon polyps     Next colonoscopy due November 2018    HTN (hypertension)     Hyperlipemia        Past Surgical History:   Procedure Laterality Date    COLONOSCOPY N/A 11/13/2015    Procedure: COLONOSCOPY;  Surgeon: Layo Hernandez III, MD;  Location: [facility];  Service: Endoscopy;  Laterality: N/A;    COLONOSCOPY N/A 8/29/2018    Procedure: COLONOSCOPY;  Surgeon: Layo Hernandez III, MD;  Location: [facility];  Service: Endoscopy;  Laterality: N/A;    COLONOSCOPY N/A 8/29/2018    Performed by Layo Hernandez III, MD at [facility]    COLONOSCOPY N/A 11/13/2015    Performed by Layo Hernandez III, MD at [facility]    NASAL SEPTUM SURGERY         SHx: per the electronic medical record    FHx: recorded in the electronic medical record    ROS:    denies any chest pains or shortness of breath. Denies any nausea, vomiting or diarrhea. Denies any fever, chills or sweats. Denies any change in weight, voice, stool, skin or hair. Denies any dysuria, dyspepsia or dysphagia. Denies any change in vision, hearing or headaches. Denies any swollen lymph nodes or loss of memory.    PE:  BP (!) 154/72 (BP Location: Left arm, Patient Position: Sitting, BP Method: Large (Automatic))   Pulse 90   Temp 97.4 °F (36.3 °C) (Tympanic)   Resp 18   Ht 6' (1.829 m)   Wt 110 kg (242 lb 8.1 oz)   SpO2 95%   BMI 32.89 kg/m²   Gen: Well-developed, well-nourished, male, in no acute distress, oriented x3  HEENT: neck is supple, no adenopathy, carotids 2+ equal without bruits, thyroid exam normal size without nodules.  CHEST: clear to auscultation and  percussion  CVS: regular rate and rhythm without significant murmur, gallop, or rubs  ABD: soft, benign, no rebound no guarding, no distention.  Bowel sounds are normal.     nontender.  No palpable masses.  No organomegaly and no audible bruits.  RECTAL: no masses.  Prostate 30  Grams without nodules.  EXT: no clubbing, cyanosis, or edema  LYMPH: no cervical, inguinal, or axillary adenopathy  FEET: no loss of sensation.  No ulcers or pressure sores.  NEURO: gait normal.  Cranial nerves II- XII intact. No nystagmus.  Speech normal.   Gross motor and sensory unremarkable.    Lab Results   Component Value Date    WBC 6.19 11/30/2018    HGB 16.2 11/30/2018    HCT 49.7 11/30/2018     11/30/2018    CHOL 199 11/30/2018    TRIG 98 11/30/2018    HDL 49 11/30/2018    ALT 29 11/30/2018    AST 19 11/30/2018     11/30/2018    K 4.5 11/30/2018     11/30/2018    CREATININE 0.9 11/30/2018    BUN 18 11/30/2018    CO2 27 11/30/2018    TSH 1.434 11/30/2018    PSA 3.5 11/30/2018       Impression:  Elevated blood pressure today.  Other medical problems below, stable  Patient Active Problem List   Diagnosis    HTN (hypertension)    Hyperlipemia    History of colon polyps       Plan:   Orders Placed This Encounter    Lipid panel    Comprehensive metabolic panel     He will start checking his blood pressure 2 to 3 times a week and record the results and send them to me via the electronic record within about a month.  He will otherwise be seen by the nurse practitioner 6 months with above lab work.

## 2018-12-20 ENCOUNTER — PATIENT MESSAGE (OUTPATIENT)
Dept: INTERNAL MEDICINE | Facility: CLINIC | Age: 68
End: 2018-12-20

## 2019-05-22 ENCOUNTER — PATIENT OUTREACH (OUTPATIENT)
Dept: ADMINISTRATIVE | Facility: HOSPITAL | Age: 69
End: 2019-05-22

## 2019-05-31 ENCOUNTER — LAB VISIT (OUTPATIENT)
Dept: LAB | Facility: HOSPITAL | Age: 69
End: 2019-05-31
Attending: INTERNAL MEDICINE
Payer: MEDICARE

## 2019-05-31 DIAGNOSIS — I10 ESSENTIAL HYPERTENSION: ICD-10-CM

## 2019-05-31 LAB
ALBUMIN SERPL BCP-MCNC: 3.6 G/DL (ref 3.5–5.2)
ALP SERPL-CCNC: 125 U/L (ref 55–135)
ALT SERPL W/O P-5'-P-CCNC: 23 U/L (ref 10–44)
ANION GAP SERPL CALC-SCNC: 10 MMOL/L (ref 8–16)
AST SERPL-CCNC: 19 U/L (ref 10–40)
BILIRUB SERPL-MCNC: 0.9 MG/DL (ref 0.1–1)
BUN SERPL-MCNC: 16 MG/DL (ref 8–23)
CALCIUM SERPL-MCNC: 10.3 MG/DL (ref 8.7–10.5)
CHLORIDE SERPL-SCNC: 106 MMOL/L (ref 95–110)
CHOLEST SERPL-MCNC: 197 MG/DL (ref 120–199)
CHOLEST/HDLC SERPL: 4.2 {RATIO} (ref 2–5)
CO2 SERPL-SCNC: 26 MMOL/L (ref 23–29)
CREAT SERPL-MCNC: 1.1 MG/DL (ref 0.5–1.4)
EST. GFR  (AFRICAN AMERICAN): >60 ML/MIN/1.73 M^2
EST. GFR  (NON AFRICAN AMERICAN): >60 ML/MIN/1.73 M^2
GLUCOSE SERPL-MCNC: 94 MG/DL (ref 70–110)
HDLC SERPL-MCNC: 47 MG/DL (ref 40–75)
HDLC SERPL: 23.9 % (ref 20–50)
LDLC SERPL CALC-MCNC: 125.4 MG/DL (ref 63–159)
NONHDLC SERPL-MCNC: 150 MG/DL
POTASSIUM SERPL-SCNC: 5.2 MMOL/L (ref 3.5–5.1)
PROT SERPL-MCNC: 7.4 G/DL (ref 6–8.4)
SODIUM SERPL-SCNC: 142 MMOL/L (ref 136–145)
TRIGL SERPL-MCNC: 123 MG/DL (ref 30–150)

## 2019-05-31 PROCEDURE — 80053 COMPREHEN METABOLIC PANEL: CPT | Mod: HCNC

## 2019-05-31 PROCEDURE — 80061 LIPID PANEL: CPT | Mod: HCNC

## 2019-05-31 PROCEDURE — 36415 COLL VENOUS BLD VENIPUNCTURE: CPT | Mod: HCNC,PO

## 2019-06-05 ENCOUNTER — OFFICE VISIT (OUTPATIENT)
Dept: INTERNAL MEDICINE | Facility: CLINIC | Age: 69
End: 2019-06-05
Payer: MEDICARE

## 2019-06-05 VITALS
DIASTOLIC BLOOD PRESSURE: 64 MMHG | OXYGEN SATURATION: 97 % | SYSTOLIC BLOOD PRESSURE: 124 MMHG | WEIGHT: 238.13 LBS | BODY MASS INDEX: 33.34 KG/M2 | TEMPERATURE: 98 F | HEART RATE: 69 BPM | HEIGHT: 71 IN

## 2019-06-05 DIAGNOSIS — R53.83 FATIGUE, UNSPECIFIED TYPE: ICD-10-CM

## 2019-06-05 DIAGNOSIS — I10 ESSENTIAL HYPERTENSION: Primary | ICD-10-CM

## 2019-06-05 DIAGNOSIS — Z00.00 ROUTINE CHECK-UP: ICD-10-CM

## 2019-06-05 DIAGNOSIS — M19.90 OSTEOARTHRITIS, UNSPECIFIED OSTEOARTHRITIS TYPE, UNSPECIFIED SITE: ICD-10-CM

## 2019-06-05 DIAGNOSIS — E78.00 PURE HYPERCHOLESTEROLEMIA: ICD-10-CM

## 2019-06-05 DIAGNOSIS — Z12.5 SCREENING FOR PROSTATE CANCER: ICD-10-CM

## 2019-06-05 PROCEDURE — 99214 OFFICE O/P EST MOD 30 MIN: CPT | Mod: 25,HCNC,S$GLB, | Performed by: NURSE PRACTITIONER

## 2019-06-05 PROCEDURE — 3078F DIAST BP <80 MM HG: CPT | Mod: HCNC,CPTII,S$GLB, | Performed by: NURSE PRACTITIONER

## 2019-06-05 PROCEDURE — 99999 PR PBB SHADOW E&M-EST. PATIENT-LVL III: CPT | Mod: PBBFAC,HCNC,, | Performed by: NURSE PRACTITIONER

## 2019-06-05 PROCEDURE — 1101F PT FALLS ASSESS-DOCD LE1/YR: CPT | Mod: HCNC,CPTII,S$GLB, | Performed by: NURSE PRACTITIONER

## 2019-06-05 PROCEDURE — 90732 PNEUMOCOCCAL POLYSACCHARIDE VACCINE 23-VALENT =>2YO SQ IM: ICD-10-PCS | Mod: HCNC,S$GLB,, | Performed by: NURSE PRACTITIONER

## 2019-06-05 PROCEDURE — 90732 PPSV23 VACC 2 YRS+ SUBQ/IM: CPT | Mod: HCNC,S$GLB,, | Performed by: NURSE PRACTITIONER

## 2019-06-05 PROCEDURE — 99214 PR OFFICE/OUTPT VISIT, EST, LEVL IV, 30-39 MIN: ICD-10-PCS | Mod: 25,HCNC,S$GLB, | Performed by: NURSE PRACTITIONER

## 2019-06-05 PROCEDURE — 99999 PR PBB SHADOW E&M-EST. PATIENT-LVL III: ICD-10-PCS | Mod: PBBFAC,HCNC,, | Performed by: NURSE PRACTITIONER

## 2019-06-05 PROCEDURE — G0009 PNEUMOCOCCAL POLYSACCHARIDE VACCINE 23-VALENT =>2YO SQ IM: ICD-10-PCS | Mod: HCNC,S$GLB,, | Performed by: NURSE PRACTITIONER

## 2019-06-05 PROCEDURE — G0009 ADMIN PNEUMOCOCCAL VACCINE: HCPCS | Mod: HCNC,S$GLB,, | Performed by: NURSE PRACTITIONER

## 2019-06-05 PROCEDURE — 3074F SYST BP LT 130 MM HG: CPT | Mod: HCNC,CPTII,S$GLB, | Performed by: NURSE PRACTITIONER

## 2019-06-05 PROCEDURE — 3074F PR MOST RECENT SYSTOLIC BLOOD PRESSURE < 130 MM HG: ICD-10-PCS | Mod: HCNC,CPTII,S$GLB, | Performed by: NURSE PRACTITIONER

## 2019-06-05 PROCEDURE — 1101F PR PT FALLS ASSESS DOC 0-1 FALLS W/OUT INJ PAST YR: ICD-10-PCS | Mod: HCNC,CPTII,S$GLB, | Performed by: NURSE PRACTITIONER

## 2019-06-05 PROCEDURE — 3078F PR MOST RECENT DIASTOLIC BLOOD PRESSURE < 80 MM HG: ICD-10-PCS | Mod: HCNC,CPTII,S$GLB, | Performed by: NURSE PRACTITIONER

## 2019-06-05 NOTE — PROGRESS NOTES
"Subjective:      Patient ID: Geremias Quijano is a 68 y.o. male.    Chief Complaint: Follow-up    HPI: patient is here for a follow up of his hypertension, hyperlipidemia.  No complaints today.  Says a few weeks ago had a steroid injection in his left hip due to OA, is better.     Past Medical History:   Diagnosis Date    History of colon polyps     Next colonoscopy due November 2018    HTN (hypertension)     Hyperlipemia        Past Surgical History:   Procedure Laterality Date    COLONOSCOPY N/A 8/29/2018    Performed by Layo Hernandez III, MD at [facility] ENDO    COLONOSCOPY N/A 11/13/2015    Performed by Layo Hernandez III, MD at [facility] ENDO    NASAL SEPTUM SURGERY         Lab Results   Component Value Date    WBC 6.19 11/30/2018    HGB 16.2 11/30/2018    HCT 49.7 11/30/2018     11/30/2018    CHOL 197 05/31/2019    TRIG 123 05/31/2019    HDL 47 05/31/2019    ALT 23 05/31/2019    AST 19 05/31/2019     05/31/2019    K 5.2 (H) 05/31/2019     05/31/2019    CREATININE 1.1 05/31/2019    BUN 16 05/31/2019    CO2 26 05/31/2019    TSH 1.434 11/30/2018    PSA 3.5 11/30/2018       /64   Pulse 69   Temp 97.6 °F (36.4 °C) (Tympanic)   Ht 5' 11" (1.803 m)   Wt 108 kg (238 lb 1.6 oz)   SpO2 97%   BMI 33.21 kg/m²       Review of Systems   Constitutional: Negative for activity change and unexpected weight change.   HENT: Negative for hearing loss, rhinorrhea and trouble swallowing.    Eyes: Negative for discharge and visual disturbance.   Respiratory: Negative for chest tightness and wheezing.    Cardiovascular: Negative for chest pain and palpitations.   Gastrointestinal: Negative for blood in stool, constipation, diarrhea and vomiting.   Endocrine: Negative for polydipsia and polyuria.   Genitourinary: Negative for difficulty urinating, hematuria and urgency.   Musculoskeletal: Negative for arthralgias, joint swelling and neck pain.   Neurological: Negative for weakness and headaches. "   Psychiatric/Behavioral: Negative for confusion and dysphoric mood.      Objective:     Physical Exam   Constitutional: He is oriented to person, place, and time. He appears well-developed and well-nourished. No distress.   Musculoskeletal:   Normal gait   Neurological: He is alert and oriented to person, place, and time.   Skin: Skin is warm and dry.   Psychiatric: He has a normal mood and affect. His behavior is normal.     Assessment:      1. Essential hypertension    2. Pure hypercholesterolemia    3. Routine check-up    4. Screening for prostate cancer    5. Fatigue, unspecified type    6. Osteoarthritis, unspecified osteoarthritis type, unspecified site      Plan:   Essential hypertension  -     Comprehensive metabolic panel; Future; Expected date: 06/05/2019    Pure hypercholesterolemia  -     Lipid panel; Future; Expected date: 06/05/2019  -     TSH; Future; Expected date: 06/05/2019    Routine check-up  -     Comprehensive metabolic panel; Future; Expected date: 06/05/2019  -     Lipid panel; Future; Expected date: 06/05/2019  -     CBC auto differential; Future; Expected date: 06/05/2019  -     TSH; Future; Expected date: 06/05/2019  -     PSA, Screening; Future; Expected date: 06/05/2019    Screening for prostate cancer  -     PSA, Screening; Future; Expected date: 06/05/2019    Fatigue, unspecified type  -     CBC auto differential; Future; Expected date: 06/05/2019    Osteoarthritis, unspecified osteoarthritis type, unspecified site    Other orders  -     (In Office Administered) Pneumococcal Polysaccharide Vaccine (23 Valent) (SQ/IM)      Had pneumonia shot today, meds remain the same.  Will see Dr Mahoney in 6 months for labs and a physical    Current Outpatient Medications:     atorvastatin (LIPITOR) 20 MG tablet, Take 1 tablet (20 mg total) by mouth every evening., Disp: 90 tablet, Rfl: 3    telmisartan (MICARDIS) 80 MG Tab, Take 1 tablet (80 mg total) by mouth once daily., Disp: 90 tablet, Rfl:  3

## 2019-06-06 ENCOUNTER — PATIENT MESSAGE (OUTPATIENT)
Dept: INTERNAL MEDICINE | Facility: CLINIC | Age: 69
End: 2019-06-06

## 2019-08-18 RX ORDER — ATORVASTATIN CALCIUM 20 MG/1
TABLET, FILM COATED ORAL
Qty: 90 TABLET | Refills: 3 | Status: SHIPPED | OUTPATIENT
Start: 2019-08-18 | End: 2020-06-02 | Stop reason: SDUPTHER

## 2019-09-06 ENCOUNTER — TELEPHONE (OUTPATIENT)
Dept: INTERNAL MEDICINE | Facility: CLINIC | Age: 69
End: 2019-09-06

## 2019-09-06 NOTE — TELEPHONE ENCOUNTER
----- Message from Sneha Jimenez sent at 9/6/2019  9:57 AM CDT -----  Call at   Need to see dr guzman next week  Fell month ago back still hurting  Offered first available

## 2019-09-09 ENCOUNTER — OFFICE VISIT (OUTPATIENT)
Dept: INTERNAL MEDICINE | Facility: CLINIC | Age: 69
End: 2019-09-09
Payer: MEDICARE

## 2019-09-09 VITALS
DIASTOLIC BLOOD PRESSURE: 70 MMHG | BODY MASS INDEX: 33.08 KG/M2 | SYSTOLIC BLOOD PRESSURE: 146 MMHG | TEMPERATURE: 97 F | HEIGHT: 71 IN | OXYGEN SATURATION: 98 % | HEART RATE: 66 BPM | WEIGHT: 236.31 LBS

## 2019-09-09 DIAGNOSIS — M54.50 ACUTE RIGHT-SIDED LOW BACK PAIN WITHOUT SCIATICA: Primary | ICD-10-CM

## 2019-09-09 PROCEDURE — 99999 PR PBB SHADOW E&M-EST. PATIENT-LVL III: CPT | Mod: PBBFAC,HCNC,, | Performed by: NURSE PRACTITIONER

## 2019-09-09 PROCEDURE — 99999 PR PBB SHADOW E&M-EST. PATIENT-LVL III: ICD-10-PCS | Mod: PBBFAC,HCNC,, | Performed by: NURSE PRACTITIONER

## 2019-09-09 PROCEDURE — 1101F PR PT FALLS ASSESS DOC 0-1 FALLS W/OUT INJ PAST YR: ICD-10-PCS | Mod: HCNC,CPTII,S$GLB, | Performed by: NURSE PRACTITIONER

## 2019-09-09 PROCEDURE — 3078F PR MOST RECENT DIASTOLIC BLOOD PRESSURE < 80 MM HG: ICD-10-PCS | Mod: HCNC,CPTII,S$GLB, | Performed by: NURSE PRACTITIONER

## 2019-09-09 PROCEDURE — 3077F SYST BP >= 140 MM HG: CPT | Mod: HCNC,CPTII,S$GLB, | Performed by: NURSE PRACTITIONER

## 2019-09-09 PROCEDURE — 3078F DIAST BP <80 MM HG: CPT | Mod: HCNC,CPTII,S$GLB, | Performed by: NURSE PRACTITIONER

## 2019-09-09 PROCEDURE — 99213 OFFICE O/P EST LOW 20 MIN: CPT | Mod: HCNC,S$GLB,, | Performed by: NURSE PRACTITIONER

## 2019-09-09 PROCEDURE — 99213 PR OFFICE/OUTPT VISIT, EST, LEVL III, 20-29 MIN: ICD-10-PCS | Mod: HCNC,S$GLB,, | Performed by: NURSE PRACTITIONER

## 2019-09-09 PROCEDURE — 1101F PT FALLS ASSESS-DOCD LE1/YR: CPT | Mod: HCNC,CPTII,S$GLB, | Performed by: NURSE PRACTITIONER

## 2019-09-09 PROCEDURE — 3077F PR MOST RECENT SYSTOLIC BLOOD PRESSURE >= 140 MM HG: ICD-10-PCS | Mod: HCNC,CPTII,S$GLB, | Performed by: NURSE PRACTITIONER

## 2019-09-09 RX ORDER — IBUPROFEN 800 MG/1
800 TABLET ORAL 3 TIMES DAILY PRN
Qty: 60 TABLET | Refills: 0 | Status: SHIPPED | OUTPATIENT
Start: 2019-09-09

## 2019-09-09 RX ORDER — CYCLOBENZAPRINE HCL 10 MG
10 TABLET ORAL NIGHTLY PRN
Qty: 30 TABLET | Refills: 0 | Status: SHIPPED | OUTPATIENT
Start: 2019-09-09 | End: 2019-09-19

## 2019-09-09 NOTE — PROGRESS NOTES
"Subjective:      Patient ID: Geremias Quijano is a 68 y.o. male.    Chief Complaint: Back Pain    HPI:  Patient states about 5 weeks ago slipped and fell while loading something, hurt his back.  Says he is having continued pain to his right lower back, has taken Advil once, took a muscle relaxer.  Says when he first wakes up is ok, hurts more as the day goes on.      Past Medical History:   Diagnosis Date    History of colon polyps     Next colonoscopy due November 2018    HTN (hypertension)     Hyperlipemia        Past Surgical History:   Procedure Laterality Date    COLONOSCOPY N/A 8/29/2018    Performed by Layo [facility], MD at [facility]    COLONOSCOPY N/A 11/13/2015    Performed by Layo Hernandez III, MD at [facility] ENDO    NASAL SEPTUM SURGERY         Lab Results   Component Value Date    WBC 6.19 11/30/2018    HGB 16.2 11/30/2018    HCT 49.7 11/30/2018     11/30/2018    CHOL 197 05/31/2019    TRIG 123 05/31/2019    HDL 47 05/31/2019    ALT 23 05/31/2019    AST 19 05/31/2019     05/31/2019    K 5.2 (H) 05/31/2019     05/31/2019    CREATININE 1.1 05/31/2019    BUN 16 05/31/2019    CO2 26 05/31/2019    TSH 1.434 11/30/2018    PSA 3.5 11/30/2018       BP (!) 146/70 (BP Location: Left arm)   Pulse 66   Temp 97 °F (36.1 °C) (Tympanic)   Ht 5' 11" (1.803 m)   Wt 107.2 kg (236 lb 5.3 oz)   SpO2 98%   BMI 32.96 kg/m²       Review of Systems   Constitutional: Negative for appetite change, chills, diaphoresis and fever.   HENT: Negative for congestion, ear pain, postnasal drip, rhinorrhea, sneezing, sore throat and trouble swallowing.    Eyes: Negative for photophobia, pain and visual disturbance.   Respiratory: Negative for apnea, cough, choking, chest tightness, shortness of breath and wheezing.    Cardiovascular: Negative for chest pain, palpitations and leg swelling.   Gastrointestinal: Negative for abdominal pain, constipation, diarrhea, nausea and vomiting.   Genitourinary: " Negative for decreased urine volume, difficulty urinating, dysuria, hematuria and urgency.   Musculoskeletal: Positive for back pain. Negative for arthralgias, gait problem, joint swelling and myalgias.   Skin: Negative for rash.   Neurological: Negative for dizziness, tremors, seizures, syncope, weakness, light-headedness, numbness and headaches.   Psychiatric/Behavioral: Negative for agitation, confusion, decreased concentration, hallucinations and sleep disturbance. The patient is not nervous/anxious.       Objective:     Physical Exam   Constitutional: He is oriented to person, place, and time. He appears well-developed and well-nourished. No distress.   HENT:   Head: Normocephalic and atraumatic.   Cardiovascular: Normal rate, regular rhythm and normal heart sounds. Exam reveals no gallop and no friction rub.   No murmur heard.  Pulmonary/Chest: Effort normal and breath sounds normal. No respiratory distress. He has no wheezes. He has no rales. He exhibits no tenderness.   Musculoskeletal: He exhibits no edema or tenderness.   Neg straight legs bilaterally, no spinal tenderness to palpation, right paraspinal lumbar muscle is mildly ttp.  Denies any radicular pain, no radiation to his legs   Neurological: He is alert and oriented to person, place, and time. No cranial nerve deficit.   Skin: Skin is warm and dry. He is not diaphoretic.   Psychiatric: He has a normal mood and affect. His behavior is normal.     Assessment:      1. Acute right-sided low back pain without sciatica      Plan:   Acute right-sided low back pain without sciatica    Other orders  -     ibuprofen (ADVIL,MOTRIN) 800 MG tablet; Take 1 tablet (800 mg total) by mouth 3 (three) times daily as needed for Pain.  Dispense: 60 tablet; Refill: 0  -     cyclobenzaprine (FLEXERIL) 10 MG tablet; Take 1 tablet (10 mg total) by mouth nightly as needed for Muscle spasms.  Dispense: 30 tablet; Refill: 0    will consistently take the advil bid or tid for  the next week with food to see if things improve, flexeril at night prn.  If not improving will consider PT      Current Outpatient Medications:     atorvastatin (LIPITOR) 20 MG tablet, TAKE 1 TABLET BY MOUTH ONCE DAILY IN THE EVENING, Disp: 90 tablet, Rfl: 3    telmisartan (MICARDIS) 80 MG Tab, Take 1 tablet (80 mg total) by mouth once daily., Disp: 90 tablet, Rfl: 3    cyclobenzaprine (FLEXERIL) 10 MG tablet, Take 1 tablet (10 mg total) by mouth nightly as needed for Muscle spasms., Disp: 30 tablet, Rfl: 0    ibuprofen (ADVIL,MOTRIN) 800 MG tablet, Take 1 tablet (800 mg total) by mouth 3 (three) times daily as needed for Pain., Disp: 60 tablet, Rfl: 0

## 2019-11-15 RX ORDER — TELMISARTAN 80 MG/1
TABLET ORAL
Qty: 90 TABLET | Refills: 0 | Status: SHIPPED | OUTPATIENT
Start: 2019-11-15 | End: 2019-11-18

## 2019-11-18 ENCOUNTER — TELEPHONE (OUTPATIENT)
Dept: INTERNAL MEDICINE | Facility: CLINIC | Age: 69
End: 2019-11-18

## 2019-11-18 RX ORDER — VALSARTAN 320 MG/1
320 TABLET ORAL DAILY
Qty: 90 TABLET | Refills: 3 | Status: SHIPPED | OUTPATIENT
Start: 2019-11-18 | End: 2020-06-02 | Stop reason: SDUPTHER

## 2019-12-03 ENCOUNTER — LAB VISIT (OUTPATIENT)
Dept: LAB | Facility: HOSPITAL | Age: 69
End: 2019-12-03
Attending: INTERNAL MEDICINE
Payer: MEDICARE

## 2019-12-03 DIAGNOSIS — Z12.5 SCREENING FOR PROSTATE CANCER: ICD-10-CM

## 2019-12-03 DIAGNOSIS — E78.00 PURE HYPERCHOLESTEROLEMIA: ICD-10-CM

## 2019-12-03 DIAGNOSIS — R53.83 FATIGUE, UNSPECIFIED TYPE: ICD-10-CM

## 2019-12-03 DIAGNOSIS — I10 ESSENTIAL HYPERTENSION: ICD-10-CM

## 2019-12-03 DIAGNOSIS — Z00.00 ROUTINE CHECK-UP: ICD-10-CM

## 2019-12-03 LAB
ALBUMIN SERPL BCP-MCNC: 3.7 G/DL (ref 3.5–5.2)
ALP SERPL-CCNC: 128 U/L (ref 55–135)
ALT SERPL W/O P-5'-P-CCNC: 33 U/L (ref 10–44)
ANION GAP SERPL CALC-SCNC: 5 MMOL/L (ref 8–16)
AST SERPL-CCNC: 22 U/L (ref 10–40)
BASOPHILS # BLD AUTO: 0.09 K/UL (ref 0–0.2)
BASOPHILS NFR BLD: 1 % (ref 0–1.9)
BILIRUB SERPL-MCNC: 0.8 MG/DL (ref 0.1–1)
BUN SERPL-MCNC: 15 MG/DL (ref 8–23)
CALCIUM SERPL-MCNC: 10 MG/DL (ref 8.7–10.5)
CHLORIDE SERPL-SCNC: 105 MMOL/L (ref 95–110)
CHOLEST SERPL-MCNC: 199 MG/DL (ref 120–199)
CHOLEST/HDLC SERPL: 4.3 {RATIO} (ref 2–5)
CO2 SERPL-SCNC: 30 MMOL/L (ref 23–29)
COMPLEXED PSA SERPL-MCNC: 2.5 NG/ML (ref 0–4)
CREAT SERPL-MCNC: 1.1 MG/DL (ref 0.5–1.4)
DIFFERENTIAL METHOD: ABNORMAL
EOSINOPHIL # BLD AUTO: 0.3 K/UL (ref 0–0.5)
EOSINOPHIL NFR BLD: 3 % (ref 0–8)
ERYTHROCYTE [DISTWIDTH] IN BLOOD BY AUTOMATED COUNT: 12.2 % (ref 11.5–14.5)
EST. GFR  (AFRICAN AMERICAN): >60 ML/MIN/1.73 M^2
EST. GFR  (NON AFRICAN AMERICAN): >60 ML/MIN/1.73 M^2
GLUCOSE SERPL-MCNC: 93 MG/DL (ref 70–110)
HCT VFR BLD AUTO: 53.2 % (ref 40–54)
HDLC SERPL-MCNC: 46 MG/DL (ref 40–75)
HDLC SERPL: 23.1 % (ref 20–50)
HGB BLD-MCNC: 16.6 G/DL (ref 14–18)
IMM GRANULOCYTES # BLD AUTO: 0.03 K/UL (ref 0–0.04)
IMM GRANULOCYTES NFR BLD AUTO: 0.3 % (ref 0–0.5)
LDLC SERPL CALC-MCNC: 131.2 MG/DL (ref 63–159)
LYMPHOCYTES # BLD AUTO: 1.6 K/UL (ref 1–4.8)
LYMPHOCYTES NFR BLD: 18.4 % (ref 18–48)
MCH RBC QN AUTO: 28.2 PG (ref 27–31)
MCHC RBC AUTO-ENTMCNC: 31.2 G/DL (ref 32–36)
MCV RBC AUTO: 90 FL (ref 82–98)
MONOCYTES # BLD AUTO: 0.7 K/UL (ref 0.3–1)
MONOCYTES NFR BLD: 7.5 % (ref 4–15)
NEUTROPHILS # BLD AUTO: 6 K/UL (ref 1.8–7.7)
NEUTROPHILS NFR BLD: 69.8 % (ref 38–73)
NONHDLC SERPL-MCNC: 153 MG/DL
NRBC BLD-RTO: 0 /100 WBC
PLATELET # BLD AUTO: 293 K/UL (ref 150–350)
PMV BLD AUTO: 10.2 FL (ref 9.2–12.9)
POTASSIUM SERPL-SCNC: 5 MMOL/L (ref 3.5–5.1)
PROT SERPL-MCNC: 7.4 G/DL (ref 6–8.4)
RBC # BLD AUTO: 5.89 M/UL (ref 4.6–6.2)
SODIUM SERPL-SCNC: 140 MMOL/L (ref 136–145)
TRIGL SERPL-MCNC: 109 MG/DL (ref 30–150)
TSH SERPL DL<=0.005 MIU/L-ACNC: 0.97 UIU/ML (ref 0.4–4)
WBC # BLD AUTO: 8.65 K/UL (ref 3.9–12.7)

## 2019-12-03 PROCEDURE — 84443 ASSAY THYROID STIM HORMONE: CPT | Mod: HCNC

## 2019-12-03 PROCEDURE — 84153 ASSAY OF PSA TOTAL: CPT | Mod: HCNC

## 2019-12-03 PROCEDURE — 85025 COMPLETE CBC W/AUTO DIFF WBC: CPT | Mod: HCNC

## 2019-12-03 PROCEDURE — 36415 COLL VENOUS BLD VENIPUNCTURE: CPT | Mod: HCNC,PO

## 2019-12-03 PROCEDURE — 80053 COMPREHEN METABOLIC PANEL: CPT | Mod: HCNC

## 2019-12-03 PROCEDURE — 80061 LIPID PANEL: CPT | Mod: HCNC

## 2019-12-05 ENCOUNTER — OFFICE VISIT (OUTPATIENT)
Dept: INTERNAL MEDICINE | Facility: CLINIC | Age: 69
End: 2019-12-05
Payer: MEDICARE

## 2019-12-05 VITALS
TEMPERATURE: 98 F | HEIGHT: 72 IN | DIASTOLIC BLOOD PRESSURE: 82 MMHG | WEIGHT: 240.06 LBS | SYSTOLIC BLOOD PRESSURE: 138 MMHG | OXYGEN SATURATION: 96 % | HEART RATE: 68 BPM | BODY MASS INDEX: 32.52 KG/M2

## 2019-12-05 DIAGNOSIS — E78.00 PURE HYPERCHOLESTEROLEMIA: ICD-10-CM

## 2019-12-05 DIAGNOSIS — Z86.010 HISTORY OF COLON POLYPS: ICD-10-CM

## 2019-12-05 DIAGNOSIS — I10 ESSENTIAL HYPERTENSION: ICD-10-CM

## 2019-12-05 DIAGNOSIS — Z00.00 ROUTINE GENERAL MEDICAL EXAMINATION AT A HEALTH CARE FACILITY: Primary | ICD-10-CM

## 2019-12-05 PROCEDURE — 3075F SYST BP GE 130 - 139MM HG: CPT | Mod: HCNC,CPTII,S$GLB, | Performed by: INTERNAL MEDICINE

## 2019-12-05 PROCEDURE — 90662 IIV NO PRSV INCREASED AG IM: CPT | Mod: HCNC,S$GLB,, | Performed by: INTERNAL MEDICINE

## 2019-12-05 PROCEDURE — 3079F PR MOST RECENT DIASTOLIC BLOOD PRESSURE 80-89 MM HG: ICD-10-PCS | Mod: HCNC,CPTII,S$GLB, | Performed by: INTERNAL MEDICINE

## 2019-12-05 PROCEDURE — 99999 PR PBB SHADOW E&M-EST. PATIENT-LVL III: ICD-10-PCS | Mod: PBBFAC,HCNC,, | Performed by: INTERNAL MEDICINE

## 2019-12-05 PROCEDURE — 90662 FLU VACCINE - HIGH DOSE (65+) PRESERVATIVE FREE IM: ICD-10-PCS | Mod: HCNC,S$GLB,, | Performed by: INTERNAL MEDICINE

## 2019-12-05 PROCEDURE — 3075F PR MOST RECENT SYSTOLIC BLOOD PRESS GE 130-139MM HG: ICD-10-PCS | Mod: HCNC,CPTII,S$GLB, | Performed by: INTERNAL MEDICINE

## 2019-12-05 PROCEDURE — 99397 PR PREVENTIVE VISIT,EST,65 & OVER: ICD-10-PCS | Mod: 25,HCNC,S$GLB, | Performed by: INTERNAL MEDICINE

## 2019-12-05 PROCEDURE — 3079F DIAST BP 80-89 MM HG: CPT | Mod: HCNC,CPTII,S$GLB, | Performed by: INTERNAL MEDICINE

## 2019-12-05 PROCEDURE — 99999 PR PBB SHADOW E&M-EST. PATIENT-LVL III: CPT | Mod: PBBFAC,HCNC,, | Performed by: INTERNAL MEDICINE

## 2019-12-05 PROCEDURE — G0008 ADMIN INFLUENZA VIRUS VAC: HCPCS | Mod: HCNC,S$GLB,, | Performed by: INTERNAL MEDICINE

## 2019-12-05 PROCEDURE — G0008 FLU VACCINE - HIGH DOSE (65+) PRESERVATIVE FREE IM: ICD-10-PCS | Mod: HCNC,S$GLB,, | Performed by: INTERNAL MEDICINE

## 2019-12-05 PROCEDURE — 99397 PER PM REEVAL EST PAT 65+ YR: CPT | Mod: 25,HCNC,S$GLB, | Performed by: INTERNAL MEDICINE

## 2019-12-05 NOTE — PROGRESS NOTES
HPI:  Patient is a 69-year-old man who comes today for follow-up of his hypertension, lipids, and for his annual physical exam.  He is doing very well.  He has no complaints.  His blood pressures been well controlled.      Current MEDS: medcard review, verified and update  Allergies: Per the electronic medical record    Past Medical History:   Diagnosis Date    History of colon polyps     HTN (hypertension)     Hyperlipemia        Past Surgical History:   Procedure Laterality Date    COLONOSCOPY N/A 11/13/2015    Procedure: COLONOSCOPY;  Surgeon: Layo Hernandez III, MD;  Location: [facility] ENDO;  Service: Endoscopy;  Laterality: N/A;    COLONOSCOPY N/A 8/29/2018    Procedure: COLONOSCOPY;  Surgeon: Layo Hernandez III, MD;  Location: [facility] ENDO;  Service: Endoscopy;  Laterality: N/A;    NASAL SEPTUM SURGERY         SHx: per the electronic medical record    FHx: recorded in the electronic medical record    ROS:    denies any chest pains or shortness of breath. Denies any nausea, vomiting or diarrhea. Denies any fever, chills or sweats. Denies any change in weight, voice, stool, skin or hair. Denies any dysuria, dyspepsia or dysphagia. Denies any change in vision, hearing or headaches. Denies any swollen lymph nodes or loss of memory.    PE:  /82   Pulse 68   Temp 97.8 °F (36.6 °C) (Tympanic)   Ht 6' (1.829 m)   Wt 108.9 kg (240 lb 1.3 oz)   SpO2 96%   BMI 32.56 kg/m²   Gen: Well-developed, well-nourished, male, in no acute distress, oriented x3  HEENT: neck is supple, no adenopathy, carotids 2+ equal without bruits, thyroid exam normal size without nodules.  CHEST: clear to auscultation and percussion  CVS: regular rate and rhythm without significant murmur, gallop, or rubs  ABD: soft, benign, no rebound no guarding, no distention.  Bowel sounds are normal.     nontender.  No palpable masses.  No organomegaly and no audible bruits.  RECTAL: no masses.  Prostate  30 Grams without nodules.  EXT: no  clubbing, cyanosis, or edema  LYMPH: no cervical, inguinal, or axillary adenopathy  FEET: no loss of sensation.  No ulcers or pressure sores.  NEURO: gait normal.  Cranial nerves II- XII intact. No nystagmus.  Speech normal.   Gross motor and sensory unremarkable.    Lab Results   Component Value Date    WBC 8.65 12/03/2019    HGB 16.6 12/03/2019    HCT 53.2 12/03/2019     12/03/2019    CHOL 199 12/03/2019    TRIG 109 12/03/2019    HDL 46 12/03/2019    ALT 33 12/03/2019    AST 22 12/03/2019     12/03/2019    K 5.0 12/03/2019     12/03/2019    CREATININE 1.1 12/03/2019    BUN 15 12/03/2019    CO2 30 (H) 12/03/2019    TSH 0.966 12/03/2019    PSA 2.5 12/03/2019       Impression:  Stable medical problems below  Patient Active Problem List   Diagnosis    HTN (hypertension)    Hyperlipemia    History of colon polyps       Plan:   Orders Placed This Encounter    Influenza - High Dose (65+) (PF) (IM)    Lipid panel    CBC auto differential    Basic metabolic panel     He was given flu vaccine today.  He will be seen again in 6 months with above lab work.  Medications remain the same  This note is generated with speech recognition software and is subject to transcription error and sound alike phrases that may be missed by proofreading.

## 2019-12-05 NOTE — PROGRESS NOTES
Pt given influenza vaccine IM left deltiod. Pt advised to wait 15 minutes to observe for adverse reaction. Pt tolerated well.

## 2020-05-26 ENCOUNTER — TELEPHONE (OUTPATIENT)
Dept: INTERNAL MEDICINE | Facility: CLINIC | Age: 70
End: 2020-05-26

## 2020-05-26 NOTE — TELEPHONE ENCOUNTER
I presume you mean back order. Can he get it from another [facility]? They can transfer the script to another [facility] pharmacy. I would prefer that first

## 2020-05-27 NOTE — TELEPHONE ENCOUNTER
Spoke with patient and he stated that we can transfer the rx to [facility]. I called the [facility] and they stated that they will call the [facility] location and if they have they will transfer it, if not they will give the office a call back

## 2020-05-29 ENCOUNTER — LAB VISIT (OUTPATIENT)
Dept: LAB | Facility: HOSPITAL | Age: 70
End: 2020-05-29
Attending: INTERNAL MEDICINE
Payer: MEDICARE

## 2020-05-29 DIAGNOSIS — I10 ESSENTIAL HYPERTENSION: ICD-10-CM

## 2020-05-29 LAB
ANION GAP SERPL CALC-SCNC: 6 MMOL/L (ref 8–16)
BASOPHILS # BLD AUTO: 0.08 K/UL (ref 0–0.2)
BASOPHILS NFR BLD: 1 % (ref 0–1.9)
BUN SERPL-MCNC: 13 MG/DL (ref 8–23)
CALCIUM SERPL-MCNC: 9.5 MG/DL (ref 8.7–10.5)
CHLORIDE SERPL-SCNC: 107 MMOL/L (ref 95–110)
CHOLEST SERPL-MCNC: 182 MG/DL (ref 120–199)
CHOLEST/HDLC SERPL: 4 {RATIO} (ref 2–5)
CO2 SERPL-SCNC: 28 MMOL/L (ref 23–29)
CREAT SERPL-MCNC: 1 MG/DL (ref 0.5–1.4)
DIFFERENTIAL METHOD: ABNORMAL
EOSINOPHIL # BLD AUTO: 0.3 K/UL (ref 0–0.5)
EOSINOPHIL NFR BLD: 3.6 % (ref 0–8)
ERYTHROCYTE [DISTWIDTH] IN BLOOD BY AUTOMATED COUNT: 12.6 % (ref 11.5–14.5)
EST. GFR  (AFRICAN AMERICAN): >60 ML/MIN/1.73 M^2
EST. GFR  (NON AFRICAN AMERICAN): >60 ML/MIN/1.73 M^2
GLUCOSE SERPL-MCNC: 99 MG/DL (ref 70–110)
HCT VFR BLD AUTO: 51.4 % (ref 40–54)
HDLC SERPL-MCNC: 45 MG/DL (ref 40–75)
HDLC SERPL: 24.7 % (ref 20–50)
HGB BLD-MCNC: 15.9 G/DL (ref 14–18)
IMM GRANULOCYTES # BLD AUTO: 0.02 K/UL (ref 0–0.04)
IMM GRANULOCYTES NFR BLD AUTO: 0.3 % (ref 0–0.5)
LDLC SERPL CALC-MCNC: 114 MG/DL (ref 63–159)
LYMPHOCYTES # BLD AUTO: 1.9 K/UL (ref 1–4.8)
LYMPHOCYTES NFR BLD: 24.2 % (ref 18–48)
MCH RBC QN AUTO: 27.9 PG (ref 27–31)
MCHC RBC AUTO-ENTMCNC: 30.9 G/DL (ref 32–36)
MCV RBC AUTO: 90 FL (ref 82–98)
MONOCYTES # BLD AUTO: 0.8 K/UL (ref 0.3–1)
MONOCYTES NFR BLD: 9.8 % (ref 4–15)
NEUTROPHILS # BLD AUTO: 4.9 K/UL (ref 1.8–7.7)
NEUTROPHILS NFR BLD: 61.1 % (ref 38–73)
NONHDLC SERPL-MCNC: 137 MG/DL
NRBC BLD-RTO: 0 /100 WBC
PLATELET # BLD AUTO: 248 K/UL (ref 150–350)
PMV BLD AUTO: 10.9 FL (ref 9.2–12.9)
POTASSIUM SERPL-SCNC: 4.9 MMOL/L (ref 3.5–5.1)
RBC # BLD AUTO: 5.7 M/UL (ref 4.6–6.2)
SODIUM SERPL-SCNC: 141 MMOL/L (ref 136–145)
TRIGL SERPL-MCNC: 115 MG/DL (ref 30–150)
WBC # BLD AUTO: 7.96 K/UL (ref 3.9–12.7)

## 2020-05-29 PROCEDURE — 85025 COMPLETE CBC W/AUTO DIFF WBC: CPT | Mod: HCNC

## 2020-05-29 PROCEDURE — 80048 BASIC METABOLIC PNL TOTAL CA: CPT | Mod: HCNC

## 2020-05-29 PROCEDURE — 36415 COLL VENOUS BLD VENIPUNCTURE: CPT | Mod: HCNC,PO

## 2020-05-29 PROCEDURE — 80061 LIPID PANEL: CPT | Mod: HCNC

## 2020-06-02 ENCOUNTER — OFFICE VISIT (OUTPATIENT)
Dept: INTERNAL MEDICINE | Facility: CLINIC | Age: 70
End: 2020-06-02
Payer: MEDICARE

## 2020-06-02 VITALS
OXYGEN SATURATION: 96 % | HEART RATE: 68 BPM | SYSTOLIC BLOOD PRESSURE: 112 MMHG | WEIGHT: 239.63 LBS | DIASTOLIC BLOOD PRESSURE: 64 MMHG | BODY MASS INDEX: 32.46 KG/M2 | HEIGHT: 72 IN

## 2020-06-02 DIAGNOSIS — M25.552 PAIN OF LEFT HIP JOINT: ICD-10-CM

## 2020-06-02 DIAGNOSIS — Z12.5 PROSTATE CANCER SCREENING: ICD-10-CM

## 2020-06-02 DIAGNOSIS — E78.00 PURE HYPERCHOLESTEROLEMIA: ICD-10-CM

## 2020-06-02 DIAGNOSIS — I10 ESSENTIAL HYPERTENSION: ICD-10-CM

## 2020-06-02 DIAGNOSIS — Z86.010 HISTORY OF COLON POLYPS: Primary | ICD-10-CM

## 2020-06-02 PROCEDURE — 99213 PR OFFICE/OUTPT VISIT, EST, LEVL III, 20-29 MIN: ICD-10-PCS | Mod: HCNC,S$GLB,, | Performed by: INTERNAL MEDICINE

## 2020-06-02 PROCEDURE — 99499 UNLISTED E&M SERVICE: CPT | Mod: HCNC,S$GLB,, | Performed by: INTERNAL MEDICINE

## 2020-06-02 PROCEDURE — 3078F DIAST BP <80 MM HG: CPT | Mod: HCNC,CPTII,S$GLB, | Performed by: INTERNAL MEDICINE

## 2020-06-02 PROCEDURE — 99499 RISK ADDL DX/OHS AUDIT: ICD-10-PCS | Mod: HCNC,S$GLB,, | Performed by: INTERNAL MEDICINE

## 2020-06-02 PROCEDURE — 99213 OFFICE O/P EST LOW 20 MIN: CPT | Mod: HCNC,S$GLB,, | Performed by: INTERNAL MEDICINE

## 2020-06-02 PROCEDURE — 99999 PR PBB SHADOW E&M-EST. PATIENT-LVL III: ICD-10-PCS | Mod: PBBFAC,HCNC,, | Performed by: INTERNAL MEDICINE

## 2020-06-02 PROCEDURE — 99999 PR PBB SHADOW E&M-EST. PATIENT-LVL III: CPT | Mod: PBBFAC,HCNC,, | Performed by: INTERNAL MEDICINE

## 2020-06-02 PROCEDURE — 1159F PR MEDICATION LIST DOCUMENTED IN MEDICAL RECORD: ICD-10-PCS | Mod: HCNC,S$GLB,, | Performed by: INTERNAL MEDICINE

## 2020-06-02 PROCEDURE — 1159F MED LIST DOCD IN RCRD: CPT | Mod: HCNC,S$GLB,, | Performed by: INTERNAL MEDICINE

## 2020-06-02 PROCEDURE — 1126F AMNT PAIN NOTED NONE PRSNT: CPT | Mod: HCNC,S$GLB,, | Performed by: INTERNAL MEDICINE

## 2020-06-02 PROCEDURE — 3078F PR MOST RECENT DIASTOLIC BLOOD PRESSURE < 80 MM HG: ICD-10-PCS | Mod: HCNC,CPTII,S$GLB, | Performed by: INTERNAL MEDICINE

## 2020-06-02 PROCEDURE — 1101F PT FALLS ASSESS-DOCD LE1/YR: CPT | Mod: HCNC,CPTII,S$GLB, | Performed by: INTERNAL MEDICINE

## 2020-06-02 PROCEDURE — 3074F PR MOST RECENT SYSTOLIC BLOOD PRESSURE < 130 MM HG: ICD-10-PCS | Mod: HCNC,CPTII,S$GLB, | Performed by: INTERNAL MEDICINE

## 2020-06-02 PROCEDURE — 1101F PR PT FALLS ASSESS DOC 0-1 FALLS W/OUT INJ PAST YR: ICD-10-PCS | Mod: HCNC,CPTII,S$GLB, | Performed by: INTERNAL MEDICINE

## 2020-06-02 PROCEDURE — 1126F PR PAIN SEVERITY QUANTIFIED, NO PAIN PRESENT: ICD-10-PCS | Mod: HCNC,S$GLB,, | Performed by: INTERNAL MEDICINE

## 2020-06-02 PROCEDURE — 3074F SYST BP LT 130 MM HG: CPT | Mod: HCNC,CPTII,S$GLB, | Performed by: INTERNAL MEDICINE

## 2020-06-02 RX ORDER — VALSARTAN 320 MG/1
320 TABLET ORAL DAILY
Qty: 90 TABLET | Refills: 3 | Status: SHIPPED | OUTPATIENT
Start: 2020-06-02 | End: 2020-06-09

## 2020-06-02 RX ORDER — ATORVASTATIN CALCIUM 20 MG/1
20 TABLET, FILM COATED ORAL NIGHTLY
Qty: 90 TABLET | Refills: 3 | Status: SHIPPED | OUTPATIENT
Start: 2020-06-02 | End: 2021-09-13

## 2020-06-02 NOTE — PROGRESS NOTES
HPI:  Patient is a 69-year-old man who comes today for follow-up of his hypertension lipids.  His blood pressures been doing well.  Does complain of some left hip pain intermittently.  He states whenever he tries to bend over to put his sock on his foot he has difficulty.  He is wondering if physical therapy would help.  He has no other complaints.    Current meds have been verified and updated per the EMR  Exam:/64 (BP Location: Right arm)   Pulse 68   Ht 6' (1.829 m)   Wt 108.7 kg (239 lb 10.2 oz)   SpO2 96%   BMI 32.50 kg/m²   Carotids 2+ equal without bruits  Chest clear  Cardiovascular regular rate and rhythm without murmur gallop or rub    Lab Results   Component Value Date    WBC 7.96 05/29/2020    HGB 15.9 05/29/2020    HCT 51.4 05/29/2020     05/29/2020    CHOL 182 05/29/2020    TRIG 115 05/29/2020    HDL 45 05/29/2020    ALT 33 12/03/2019    AST 22 12/03/2019     05/29/2020    K 4.9 05/29/2020     05/29/2020    CREATININE 1.0 05/29/2020    BUN 13 05/29/2020    CO2 28 05/29/2020    TSH 0.966 12/03/2019    PSA 2.5 12/03/2019       Impression:  Mild sciatica, left lower extremity  Hypertension lipids well controlled   Patient Active Problem List   Diagnosis    HTN (hypertension)    Hyperlipemia    History of colon polyps       Plan:  Orders Placed This Encounter    Comprehensive metabolic panel    Lipid Panel    TSH    CBC auto differential    PSA, Screening    Ambulatory referral/consult to Physical/Occupational Therapy    atorvastatin (LIPITOR) 20 MG tablet    valsartan (DIOVAN) 320 MG tablet     Medications remain the same.  He will be seen again in 6 months with above lab work.  Was referred to physical therapy    This note is generated with speech recognition software and is subject to transcription error and sound alike phrases that may be missed by proofreading.

## 2020-06-03 ENCOUNTER — CLINICAL SUPPORT (OUTPATIENT)
Dept: REHABILITATION | Facility: HOSPITAL | Age: 70
End: 2020-06-03
Attending: INTERNAL MEDICINE
Payer: MEDICARE

## 2020-06-03 DIAGNOSIS — M25.552 PAIN OF LEFT HIP JOINT: ICD-10-CM

## 2020-06-03 DIAGNOSIS — M25.552 LEFT HIP PAIN: ICD-10-CM

## 2020-06-03 PROCEDURE — 97161 PT EVAL LOW COMPLEX 20 MIN: CPT | Mod: HCNC

## 2020-06-03 NOTE — PLAN OF CARE
[facility]  Physical Therapy Initial Evaluation    Name: Geremias Quijano  Clinic Number: [id number]    Therapy Diagnosis:   Encounter Diagnosis   Name Primary?    Pain of left hip joint      Physician: Martínez White MD    Physician Orders: PT Eval and Treat   Medical Diagnosis from Referral: Pain of left hip joint  Evaluation Date: 6/3/2020  Authorization Period Expiration: 6/2/21  Plan of Care Expiration: 7/3/20  Visit # / Visits authorized: 1/ 1    Time In: 12:30  Time Out: 1:00  Total Billable Time: 5 minutes    Precautions: Standard    Subjective   Date of onset:  2 years, gotten worse in the last 6 months  History of current condition - Geremias reports: that his L hip hurts sometimes but ihis having issues leaning over to put socks and leaning over is not able to do. Loss of motion in the last 8 months- hip pain in the last 2 years. Has had 2 steroid injections. Sometimes hurts after sitting for long periods of time and then walking it out tends to make it feel better. Not able to lean over on the L side- hurts to try and push down too far.      Pain:  Current 0/10, worst 7/10, best 0/10   Location: left hip  Description: Aching, Dull and Sharp  Aggravating Factors: Sitting and Getting out of bed/chair, leaning forward  Easing Factors: nothing    Prior Therapy: yes for shoulder years ago  Social History:  lives with their spouse  Occupation: sales  Prior Level of Function: IND  Current Level of Function: MI with decrease in ROM    Imaging: none    Medical History:   Past Medical History:   Diagnosis Date    History of colon polyps     HTN (hypertension)     Hyperlipemia        Surgical History:   Geremias Quijano  has a past surgical history that includes Nasal septum surgery; Colonoscopy (N/A, 11/13/2015); and Colonoscopy (N/A, 8/29/2018).    Medications:   Geremias has a current medication list which includes the following prescription(s): atorvastatin, ibuprofen, and  valsartan.    Allergies: none     Pts goals: improve ROM, be able to put on socks    Objective       CMS Impairment/Limitation/Restriction for FOTO Hip Survey    Therapist reviewed FOTO scores for Geremias Quijano on 6/3/2020.   FOTO documents entered into [employer] - see Media section.    Limitation Score: 2%  Category: Mobility    Current : CI = at least 1% but < 20% impaired, limited or restricted  Goal: CI = at least 1% but < 20% impaired, limited or restricted  Discharge: NA       Gait: normal    Squat: Double leg: decreased ability to get low   Single leg: DNT    Balance: good    Reflex/Sensation: normal    Hip A/PROM:     (L) (R)     Flexion   75% p!    90%     Extension  50% 50%     Abduction  75% 90%     Adduction  75% 90%     External rotation 50% 75%     Internal rotation 50% 75%         Strength:  Hip flexors  4+/5 4+/5  Quadriceps  5/5 5/5      Hamstrings  5/5 5/5     Anterior Tibialis 4+/5 4+/5     Peroneals  4+/5 4+/5     Gastrocnemius 4+/5 4+/5     Adductors  4+/5 4+/5     External rotators 3+/5 3+/5     Internal rotators 3+/5 3+/5     Gluteus Medius 3+/5 3+/5     Gluteus Edwin 3+/5 3+/5    Joint Mobility: decreased to L hip in all directions    PIVM Lumbar: hypomobile    Muscle Length:  Hamstrings: decreased on both sides     Jelani Test: pos on both sides    Special Test:  WHIT  neg FADIR   neg     Scouring  neg     Tenderness to palpation: no tenderness    Lower Limb Tension Test:  negative    TREATMENT   Treatment Time In: 12:55  Treatment Time Out: 1:00  Total Treatment time separate from Evaluation: 5 minutes    Geremias received therapeutic exercises to develop strength and flexibility for 5 minutes including:  Standing hip FL against TB  Quadruped rockback  LTR    Home Exercises and Patient Education Provided    Education provided:   - Progression of rehab    Written Home Exercises Provided: yes.  Exercises were reviewed and Geremias was able to demonstrate them prior to the end of the session.  Geremias  demonstrated good  understanding of the education provided.     See EMR under Patient Instructions for exercises provided 6/3/2020.    Assessment   Geremias is a 69 y.o. male referred to outpatient Physical Therapy with a medical diagnosis of pain of left hip joint. Pt presents with decreased L hip ROM, decreased muscle extensibility, decreased strength, and decreased tolerance to activities. Patient's was negative for all special test in regards to his L hip and symptoms seem to be consistent with decreased mobility causing increased pain with over pressure at the end range.     Pt prognosis is Excellent.   Pt will benefit from skilled outpatient Physical Therapy to address the deficits stated above and in the chart below, provide pt/family education, and to maximize pt's level of independence.     Plan of care discussed with patient: Yes  Pt's spiritual, cultural and educational needs considered and patient is agreeable to the plan of care and goals as stated below:     Anticipated Barriers for therapy: none    Medical Necessity is demonstrated by the following  History  Co-morbidities and personal factors that may impact the plan of care Co-morbidities:   HTN    Personal Factors:   no deficits     low   Examination  Body Structures and Functions, activity limitations and participation restrictions that may impact the plan of care Body Regions:   lower extremities    Body Systems:    ROM  strength  transfers  motor control  motor learning    Participation Restrictions:   Work, ADLs, wanted activities    Activity limitations:   Learning and applying knowledge  no deficits    General Tasks and Commands  no deficits    Communication  no deficits    Mobility  lifting and carrying objects  walking    Self care  no deficits    Domestic Life  no deficits    Interactions/Relationships  no deficits    Life Areas  no deficits    Community and Social Life  community life  recreation and leisure         low   Clinical  Presentation stable and uncomplicated low   Decision Making/ Complexity Score: low     Short Term Goals: In 3 weeks:  1.I with HEP  2.Patient to demo increased AROM /PROM to 75% in the L hip  3.Patient to demo increase LE strength by 1/2 grade  4.Patient to have decreased pain to 3/10 at all times.    Long Term Goals: In 6 weeks  1. Patient to perform daily activities including bending without limitation.  2. Patient to demonstrate increased LE strength by 1 grade  3. Patient to have decreased pain to 0/10 with all activities.  5. Patient to score to 0% on the LEFS.     Plan   Plan of care Certification: 6/3/2020 to 7/3/20.    Outpatient Physical Therapy 2 times weekly for 6 weeks to include the following interventions: Manual Therapy, Neuromuscular Re-ed, Patient Education and Therapeutic Activites.     Blanca Martin, PT

## 2020-06-09 ENCOUNTER — TELEPHONE (OUTPATIENT)
Dept: INTERNAL MEDICINE | Facility: CLINIC | Age: 70
End: 2020-06-09

## 2020-06-09 RX ORDER — OLMESARTAN MEDOXOMIL 40 MG/1
40 TABLET ORAL DAILY
Qty: 90 TABLET | Refills: 3 | Status: SHIPPED | OUTPATIENT
Start: 2020-06-09 | End: 2020-07-16

## 2020-06-09 NOTE — TELEPHONE ENCOUNTER
Pharmacy sent a fax saying Valsartan is not available. Would like to know if you can send in an alternative medication?

## 2020-06-10 ENCOUNTER — CLINICAL SUPPORT (OUTPATIENT)
Dept: REHABILITATION | Facility: HOSPITAL | Age: 70
End: 2020-06-10
Attending: INTERNAL MEDICINE
Payer: MEDICARE

## 2020-06-10 ENCOUNTER — DOCUMENTATION ONLY (OUTPATIENT)
Dept: REHABILITATION | Facility: HOSPITAL | Age: 70
End: 2020-06-10

## 2020-06-10 DIAGNOSIS — M25.552 LEFT HIP PAIN: ICD-10-CM

## 2020-06-10 PROCEDURE — 97110 THERAPEUTIC EXERCISES: CPT | Mod: HCNC,CQ

## 2020-06-10 NOTE — PROGRESS NOTES
PT/PTA met face to face to discuss pt's treatment plan and progress towards established goals. Pt will be seen by a physical therapist minimally every 6th visit or every 30 days.      Joann Prasad PTA

## 2020-06-10 NOTE — PROGRESS NOTES
Physical Therapy Assistant Treatment Note     Name: Geremias Quijano  Clinic Number: [id number]    Therapy Diagnosis:   Encounter Diagnosis   Name Primary?    Left hip pain      Physician: Martínez White MD    Visit Date: 6/10/2020    Physician Orders: PT Eval and Treat   Medical Diagnosis from Referral: Pain of left hip joint  Evaluation Date: 6/3/2020  Authorization Period Expiration: 6/2/21  Plan of Care Expiration: 7/3/20  Visit # / Visits authorized: 2/ 30     Time In: 1:45  Time Out: 2:30  Total Billable Time: 45 minutes     Precautions: Standard    Subjective     Pt reports: he continues to have hip pain, but he is still able to go to the health club and he started doing hip abd/ hip add machines.  He was compliant with home exercise program.  Response to previous treatment: good  Functional change: nothing yet    Pain: 3/10  Location: left hip      Objective     Geremias received therapeutic exercises to develop strength and flexibility for 45 minutes including:  Bike 6'  Standing hip FL against TB  Quadruped rockback  LTR/ DKTC with ball  Prone hip extension  Side-lying leg raise  SLR  Total Gym SL Squats  Bridges with GTB  Supine Hip flexion with distraction     Home Exercises and Patient Education Provided     Education provided:   - Progression of rehab     Written Home Exercises Provided: yes.  Exercises were reviewed and Geremias was able to demonstrate them prior to the end of the session.  Geremias demonstrated good  understanding of the education provided.      See EMR under Patient Instructions for exercises provided 6/3/2020.  Assessment     Pt. Presents to therapy without any significant complaints. He tolerated all therex without any increase in pain. He will benefit from higher level activities and progressing exercises next visit.  Geremias is progressing well towards his goals.   Pt prognosis is Excellent.     Pt will continue to benefit from skilled outpatient physical therapy to address the deficits  listed in the problem list box on initial evaluation, provide pt/family education and to maximize pt's level of independence in the home and community environment.     Pt's spiritual, cultural and educational needs considered and pt agreeable to plan of care and goals.     Anticipated barriers to physical therapy: none    Short Term Goals: In 3 weeks:  1.I with HEP met  2.Patient to demo increased AROM /PROM to 75% in the L hip progressing, not met  3.Patient to demo increase LE strength by 1/2 grade progressing, not met  4.Patient to have decreased pain to 3/10 at all times. Progressing     Long Term Goals: In 6 weeks  1. Patient to perform daily activities including bending without limitation.  2. Patient to demonstrate increased LE strength by 1 grade  3. Patient to have decreased pain to 0/10 with all activities.  5. Patient to score to 0% on the LEFS.      Plan   Plan of care Certification: 6/3/2020 to 7/3/20.     Outpatient Physical Therapy 2 times weekly for 6 weeks to include the following interventions: Manual Therapy, Neuromuscular Re-ed, Patient Education and Therapeutic Activites.       Joann Prasad, PTA

## 2020-06-12 ENCOUNTER — CLINICAL SUPPORT (OUTPATIENT)
Dept: REHABILITATION | Facility: HOSPITAL | Age: 70
End: 2020-06-12
Attending: INTERNAL MEDICINE
Payer: MEDICARE

## 2020-06-12 DIAGNOSIS — M25.552 LEFT HIP PAIN: ICD-10-CM

## 2020-06-12 PROCEDURE — 97110 THERAPEUTIC EXERCISES: CPT | Mod: HCNC,CQ

## 2020-06-12 NOTE — PROGRESS NOTES
Physical Therapy Assistant Treatment Note     Name: Geremias Quijano  Clinic Number: [id number]    Therapy Diagnosis:   Encounter Diagnosis   Name Primary?    Left hip pain      Physician: Martínez White MD    Visit Date: 6/12/2020    Physician Orders: PT Eval and Treat   Medical Diagnosis from Referral: Pain of left hip joint  Evaluation Date: 6/3/2020  Authorization Period Expiration: 6/2/21  Plan of Care Expiration: 7/3/20  Visit # / Visits authorized: 3/ 30     Time In: 1:15  Time Out: 2:00  Total Billable Time: 45 minutes     Precautions: Standard    Subjective     Pt reports: he is feeling better today and he can put his shoes on without any difficulty. He went to the gym yesterday and he did leg press, elliptical, and other leg machines.  He was compliant with home exercise program.  Response to previous treatment: improved hip ROM  Functional change: nothing yet    Pain: 0/10   Location: left hip      Objective     Geremias received therapeutic exercises to develop strength and flexibility for 45 minutes including:  Bike 6'  Prone hip extension  Hip 7 way  Lunges with unilateral UE support  Monster walks and Side squats 2' each  Total Gym SL Squats  Single Leg Bridges  RDLs     Home Exercises and Patient Education Provided     Education provided:   - Progression of rehab     Written Home Exercises Provided: yes.  Exercises were reviewed and Geremias was able to demonstrate them prior to the end of the session.  Geremias demonstrated good  understanding of the education provided.      See EMR under Patient Instructions for exercises provided 6/3/2020.  Assessment     Pt. Presents with decrease in symptoms and increased hip ROM. He completed higher level activities with minimal difficulty during SL balance exercises. Verbal cues required for slow and controlled movement and posture.    Geremias is progressing well towards his goals.   Pt prognosis is Excellent.     Pt will continue to benefit from skilled outpatient  physical therapy to address the deficits listed in the problem list box on initial evaluation, provide pt/family education and to maximize pt's level of independence in the home and community environment.     Pt's spiritual, cultural and educational needs considered and pt agreeable to plan of care and goals.     Anticipated barriers to physical therapy: none    Short Term Goals: In 3 weeks:  1.I with HEP met  2.Patient to demo increased AROM /PROM to 75% in the L hip progressing, not met  3.Patient to demo increase LE strength by 1/2 grade progressing, not met  4.Patient to have decreased pain to 3/10 at all times. Progressing     Long Term Goals: In 6 weeks  1. Patient to perform daily activities including bending without limitation.  2. Patient to demonstrate increased LE strength by 1 grade  3. Patient to have decreased pain to 0/10 with all activities.  5. Patient to score to 0% on the LEFS.      Plan   Plan of care Certification: 6/3/2020 to 7/3/20.     Outpatient Physical Therapy 2 times weekly for 6 weeks to include the following interventions: Manual Therapy, Neuromuscular Re-ed, Patient Education and Therapeutic Activites.       Joann Prasad, PTA

## 2020-06-16 ENCOUNTER — CLINICAL SUPPORT (OUTPATIENT)
Dept: REHABILITATION | Facility: HOSPITAL | Age: 70
End: 2020-06-16
Attending: INTERNAL MEDICINE
Payer: MEDICARE

## 2020-06-16 DIAGNOSIS — M25.552 LEFT HIP PAIN: ICD-10-CM

## 2020-06-16 PROCEDURE — 97140 MANUAL THERAPY 1/> REGIONS: CPT | Mod: HCNC

## 2020-06-16 PROCEDURE — 97110 THERAPEUTIC EXERCISES: CPT | Mod: HCNC

## 2020-06-16 NOTE — PROGRESS NOTES
Physical Therapy Assistant Treatment Note     Name: Geremias Quijano  Clinic Number: [id number]    Therapy Diagnosis:   No diagnosis found.  Physician: Martínez White MD    Visit Date: 6/16/2020    Physician Orders: PT Eval and Treat   Medical Diagnosis from Referral: Pain of left hip joint  Evaluation Date: 6/3/2020  Authorization Period Expiration: 6/2/21  Plan of Care Expiration: 7/3/20  Visit # / Visits authorized: 3/ 30     Time In: 10:45am  Time Out: 11:30am  Total Billable Time: 45 minutes     Precautions: Standard    Subjective     Pt reports: that his hip seems to be better every other day.  He was compliant with home exercise program.  Response to previous treatment: improved hip ROM  Functional change: intermittent improvement in motion    Pain: 0/10   Location: left hip      Objective     Geremias received therapeutic exercises to develop strength and flexibility for 35 minutes including:  Upright Bike 6'  Split squat with UE hold for balance  Walking lunges  Walking side lunges  Squat with hip distraction  Supine hip FL with distraction  Squat with TRX band to improve depth  RDLs 10# kettlebell    Geremias received manual therapy to develop flexibility, improve ROM, and to decrease pain for 10 minutes including:  Lateral hip mobilizations  PROM to L hip  L hip distraction     Home Exercises and Patient Education Provided     Education provided:   - Progression of rehab     Written Home Exercises Provided: yes.  Exercises were reviewed and Geremias was able to demonstrate them prior to the end of the session.  Geremias demonstrated good  understanding of the education provided.      See EMR under Patient Instructions for exercises provided 6/3/2020.  Assessment   Patient demonstrated good tolerance to more hip mobility in manual therapy and exercises with improvement in motion by the end of the session. No pain reported with end range hip flexion.    Geremias is progressing well towards his goals.   Pt prognosis is  Excellent.     Pt will continue to benefit from skilled outpatient physical therapy to address the deficits listed in the problem list box on initial evaluation, provide pt/family education and to maximize pt's level of independence in the home and community environment.     Pt's spiritual, cultural and educational needs considered and pt agreeable to plan of care and goals.     Anticipated barriers to physical therapy: none    Short Term Goals: In 3 weeks:  1.I with HEP met  2.Patient to demo increased AROM /PROM to 75% in the L hip progressing, not met  3.Patient to demo increase LE strength by 1/2 grade progressing, not met  4.Patient to have decreased pain to 3/10 at all times. Progressing     Long Term Goals: In 6 weeks  1. Patient to perform daily activities including bending without limitation.  2. Patient to demonstrate increased LE strength by 1 grade  3. Patient to have decreased pain to 0/10 with all activities.  5. Patient to score to 0% on the LEFS.      Plan   Plan of care Certification: 6/3/2020 to 7/3/20.     Outpatient Physical Therapy 2 times weekly for 6 weeks to include the following interventions: Manual Therapy, Neuromuscular Re-ed, Patient Education and Therapeutic Activites.       Blanca Martin, PT

## 2020-06-18 ENCOUNTER — CLINICAL SUPPORT (OUTPATIENT)
Dept: REHABILITATION | Facility: HOSPITAL | Age: 70
End: 2020-06-18
Attending: INTERNAL MEDICINE
Payer: MEDICARE

## 2020-06-18 DIAGNOSIS — M25.552 LEFT HIP PAIN: ICD-10-CM

## 2020-06-18 PROCEDURE — 97110 THERAPEUTIC EXERCISES: CPT | Mod: HCNC,CQ

## 2020-06-18 PROCEDURE — 97140 MANUAL THERAPY 1/> REGIONS: CPT | Mod: HCNC,CQ

## 2020-06-18 NOTE — PROGRESS NOTES
Physical Therapy Assistant Treatment Note     Name: Geremias Quijano  Clinic Number: [id number]    Therapy Diagnosis:   Encounter Diagnosis   Name Primary?    Left hip pain      Physician: Martínez White MD    Visit Date: 6/18/2020    Physician Orders: PT Eval and Treat   Medical Diagnosis from Referral: Pain of left hip joint  Evaluation Date: 6/3/2020  Authorization Period Expiration: 6/2/21  Plan of Care Expiration: 7/3/20  Visit # / Visits authorized: 4/ 30     Time In: 2:00  Time Out: 2:48  Total Billable Time: 48 minutes     Precautions: Standard    Subjective     Pt reports: no new complaints. Pt. Has no reports of pain in hip although still lacking ROM.  He was compliant with home exercise program.  Response to previous treatment: improved hip ROM  Functional change: intermittent improvement in motion    Pain: 0/10   Location: left hip      Objective     Geremias received therapeutic exercises to develop strength and flexibility for 40 minutes including:  Upright Bike 6'  Split squat with UE hold for balance  Walking lunges  Walking side lunges  Squat with hip distraction  Supine hip FL with distraction  Squat with TRX band to improve depth  RDLs 10# kettlebell  Bridges single leg 2x10  7 way hip  Ball toss to trampoline    Geremias received manual therapy to develop flexibility, improve ROM, and to decrease pain for 8 minutes including:  Lateral hip mobilizations- not today  PROM to L hip  L hip long axis distraction     Home Exercises and Patient Education Provided     Education provided:   - Progression of rehab     Written Home Exercises Provided: yes.  Exercises were reviewed and Geremias was able to demonstrate them prior to the end of the session.  Geremias demonstrated good  understanding of the education provided.      See EMR under Patient Instructions for exercises provided 6/3/2020.  Assessment     Pt. Has no new complaints today. Pt. Tolerated higher level activities well without any increase in symptoms.  Required minimal verbal cues to avoid anterior tibial translation with squats and lunges. Pt. Still lacking full hip ROM and will benefit from continued skilled therapy for ROM, LE strength, and balance.    Geremias is progressing well towards his goals.   Pt prognosis is Excellent.     Pt will continue to benefit from skilled outpatient physical therapy to address the deficits listed in the problem list box on initial evaluation, provide pt/family education and to maximize pt's level of independence in the home and community environment.     Pt's spiritual, cultural and educational needs considered and pt agreeable to plan of care and goals.     Anticipated barriers to physical therapy: none    Short Term Goals: In 3 weeks:   1.I with HEP met  2.Patient to demo increased AROM /PROM to 75% in the L hip progressing, not met  3.Patient to demo increase LE strength by 1/2 grade progressing, not met  4.Patient to have decreased pain to 3/10 at all times. Progressing     Long Term Goals: In 6 weeks  1. Patient to perform daily activities including bending without limitation.  2. Patient to demonstrate increased LE strength by 1 grade  3. Patient to have decreased pain to 0/10 with all activities.  5. Patient to score to 0% on the LEFS.      Plan   Plan of care Certification: 6/3/2020 to 7/3/20.     Outpatient Physical Therapy 2 times weekly for 6 weeks to include the following interventions: Manual Therapy, Neuromuscular Re-ed, Patient Education and Therapeutic Activites.       Joann Prasad, PTA

## 2020-06-23 ENCOUNTER — CLINICAL SUPPORT (OUTPATIENT)
Dept: REHABILITATION | Facility: HOSPITAL | Age: 70
End: 2020-06-23
Attending: INTERNAL MEDICINE
Payer: MEDICARE

## 2020-06-23 DIAGNOSIS — M25.552 LEFT HIP PAIN: Primary | ICD-10-CM

## 2020-06-23 PROCEDURE — 97110 THERAPEUTIC EXERCISES: CPT | Mod: HCNC,CQ

## 2020-06-23 NOTE — PROGRESS NOTES
Physical Therapy Assistant Treatment Note     Name: Geremias Quijano  Clinic Number: [id number]    Therapy Diagnosis:   No diagnosis found.  Physician: Martínez White MD    Visit Date: 6/23/2020    Physician Orders: PT Eval and Treat   Medical Diagnosis from Referral: Pain of left hip joint  Evaluation Date: 6/3/2020  Authorization Period Expiration: 6/2/21  Plan of Care Expiration: 7/3/20  Visit # / Visits authorized: 5/ 30     Time In: 11:15  Time Out: 12:00  Total Billable Time:45  minutes     Precautions: Standard    Subjective     Pt reports: doing all the activities he wants to do, however some are modified due to his hip.   He was compliant with home exercise program.  Response to previous treatment: no issues  Functional change: intermittent improvement in motion    Pain: 0/10   Location: na today    Objective     Geremias received therapeutic exercises to develop strength and flexibility for 35 minutes including:  Upright Bike 6'  Supine wind shield wiper stretches  Hooklying add stretches  Supine quad/psoas stretches  JAELYN into press ups  Prone hip extension  Bridges with straight legs on ball  Supine hip FL with distraction  Squat with TRX band to improve depth  Lunges : FW and Lateral w/hip hinge  Kneeling trunk rotations  PROM left hip  HSS      Geremias received manual therapy to develop flexibility, improve ROM, and to decrease pain for 10 minutes including:  Lateral and posterior hip mobilizations  Contract-relax for ER range   L hip long axis distraction     Home Exercises and Patient Education Provided     Education provided:   - Progression of rehab     Written Home Exercises Provided: yes.  Exercises were reviewed and Geremias was able to demonstrate them prior to the end of the session.  Geremias demonstrated good  understanding of the education provided.      See EMR under Patient Instructions for exercises provided 6/3/2020.  Assessment     Geremias presents today feeling good about his progress with therapy.  Today the focus was increased with stretches both dynamically and statically for improved range of motion. His technique with lunging and squats improved with hip hinge; overall stability mildly impaired requiring more glute strengthening. Geremias performed all his activities without complaints of pain or adverse symptoms; some discomfort with end range stretching.   Geremias is progressing well towards his goals.   Pt prognosis is Excellent.     Pt will continue to benefit from skilled outpatient physical therapy to address the deficits listed in the problem list box on initial evaluation, provide pt/family education and to maximize pt's level of independence in the home and community environment.     Pt's spiritual, cultural and educational needs considered and pt agreeable to plan of care and goals.     Anticipated barriers to physical therapy: none    Short Term Goals: In 3 weeks:   1.I with HEP met  2.Patient to demo increased AROM /PROM to 75% in the L hip progressing, not met  3.Patient to demo increase LE strength by 1/2 grade progressing, not met  4.Patient to have decreased pain to 3/10 at all times. Progressing     Long Term Goals: In 6 weeks  1. Patient to perform daily activities including bending without limitation.  2. Patient to demonstrate increased LE strength by 1 grade  3. Patient to have decreased pain to 0/10 with all activities.  5. Patient to score to 0% on the LEFS.      Plan   Plan of care Certification: 6/3/2020 to 7/3/20.     Outpatient Physical Therapy 2 times weekly for 6 weeks to include the following interventions: Manual Therapy, Neuromuscular Re-ed, Patient Education and Therapeutic Activites.       Eun Myrick, W[place]ATT

## 2020-06-24 NOTE — PROGRESS NOTES
Physical Therapy Assistant Treatment Note     Name: Geremias Quijano  Clinic Number: [id number]    Therapy Diagnosis:   Encounter Diagnosis   Name Primary?    Left hip pain      Physician: Martínez Wihte MD    Visit Date: 6/25/2020    Physician Orders: PT Eval and Treat   Medical Diagnosis from Referral: Pain of left hip joint  Evaluation Date: 6/3/2020  Authorization Period Expiration: 6/2/21  Plan of Care Expiration: 7/3/20  Visit # / Visits authorized: 6/ 30     Time In: 2:00  Time Out: 3:00  Total Billable Time: 60 minutes     Precautions: Standard    Subjective     Pt reports: increased soreness after last session.  He was compliant with home exercise program.  Response to previous treatment: sore  Functional change: intermittent improvement in motion    Pain: 0/10   Location: na today    Objective     Geremias received therapeutic exercises to develop strength and flexibility for 45 minutes including:  Elliptical 6'  Supine wind shield wiper stretches  Hooklying add stretches  Supine quad/psoas stretches  JAELYN into press ups  Prone hip extension  Bridges with straight legs on ball  Supine hip FL with distraction  Squat with TRX band to improve depth  Lunges : FW and Lateral w/hip hinge  Kneeling trunk rotations  Split squats 2x15    Geremias received manual therapy to develop flexibility, improve ROM, and to decrease pain for 15 minutes including:  Lateral and posterior hip mobilizations  Contract-relax for ER range   L hip long axis distraction    CMS Impairment/Limitation/Restriction for FOTO Hip Survey     Therapist reviewed FOTO scores for Geremias Quijano on 6/25/2020.   FOTO documents entered into [employer] - see Media section.     Limitation Score: 12%  Category: Mobility     Current : CI = at least 1% but < 20% impaired, limited or restricted  Goal: CI = at least 1% but < 20% impaired, limited or restricted  Discharge: NA     Home Exercises and Patient Education Provided  Education provided:   - Progression of  rehab  -added deep squats with pulses L/R to HEP     Written Home Exercises Provided: yes.  Exercises were reviewed and Geremias was able to demonstrate them prior to the end of the session.  Geremias demonstrated good  understanding of the education provided.      See EMR under Patient Instructions for exercises provided 6/3/2020.  Assessment     Geremias presents with no complaints of pain although still lacking ROM in left hip. Pt. Completed ROM and higher level hip strengthening activities without any difficulty. Increased ROM noted since last session and improvement shown after contract-relax technique into ER. Pt. Will continue to benefit from hip stabilization and ROM exercises.  Geremias is progressing well towards his goals.   Pt prognosis is Excellent.     Pt will continue to benefit from skilled outpatient physical therapy to address the deficits listed in the problem list box on initial evaluation, provide pt/family education and to maximize pt's level of independence in the home and community environment.     Pt's spiritual, cultural and educational needs considered and pt agreeable to plan of care and goals.     Anticipated barriers to physical therapy: none    Short Term Goals: In 3 weeks:  1.I with HEP met  2.Patient to demo increased AROM /PROM to 75% in the L hip progressing, not met  3.Patient to demo increase LE strength by 1/2 grade progressing, not met  4.Patient to have decreased pain to 3/10 at all times. Progressing     Long Term Goals: In 6 weeks  1. Patient to perform daily activities including bending without limitation.  2. Patient to demonstrate increased LE strength by 1 grade  3. Patient to have decreased pain to 0/10 with all activities.  5. Patient to score to 0% on the LEFS.      Plan   Plan of care Certification: 6/3/2020 to 7/3/20.     Outpatient Physical Therapy 2 times weekly for 6 weeks to include the following interventions: Manual Therapy, Neuromuscular Re-ed, Patient Education and  Therapeutic Activites.       Joann Prasad, PTA

## 2020-06-25 ENCOUNTER — CLINICAL SUPPORT (OUTPATIENT)
Dept: REHABILITATION | Facility: HOSPITAL | Age: 70
End: 2020-06-25
Attending: INTERNAL MEDICINE
Payer: MEDICARE

## 2020-06-25 DIAGNOSIS — M25.552 LEFT HIP PAIN: ICD-10-CM

## 2020-06-25 PROCEDURE — 97140 MANUAL THERAPY 1/> REGIONS: CPT | Mod: HCNC,CQ

## 2020-06-25 PROCEDURE — 97110 THERAPEUTIC EXERCISES: CPT | Mod: HCNC,CQ

## 2020-06-30 NOTE — PROGRESS NOTES
PHYSICAL THERAPY TREATMENT NOTE    Name: Geremias Quijano  Clinic Number: [id number]    Therapy Diagnosis:   Encounter Diagnosis   Name Primary?    Left hip pain      Physician: Martínez White MD    Visit Date: 7/1/2020    Physician Orders: PT Eval and Treat   Medical Diagnosis from Referral: Pain of left hip joint  Evaluation Date: 6/3/2020  Authorization Period Expiration: 6/2/21  Plan of Care Expiration: 7/1/20  Visit # / Visits authorized: 7/ 30     Time In: 8:30  Time Out: 9:15  Total Billable Time: 45 minutes     Precautions: Standard    Subjective     Pt reports: that he has not noticed a lot of difference  He was compliant with home exercise program.  Response to previous treatment: sore  Functional change: intermittent improvement in motion    Pain: 0/10   Location: na today    Objective     Geremias received therapeutic exercises to develop strength and flexibility for 20 minutes including:  Elliptical 6'  Deep squat holding onto pole  Prone hip rocks  Quadruped rockback  Cat/cow    Geremias received manual therapy to improve mobility, decrease pain, and improve motion for 25 minutes including:  PROM to L hip  L hip distraction manip  Hip ER mobs  Posterior/lateral mobs     Home Exercises and Patient Education Provided  Education provided:   - Progression of rehab  -added deep squats with pulses L/R to HEP     Written Home Exercises Provided: yes.  Exercises were reviewed and Geremias was able to demonstrate them prior to the end of the session.  Geremias demonstrated good  understanding of the education provided.      See EMR under Patient Instructions for exercises provided 6/3/2020.  Assessment     Patient demonstrated improvement in ROM after manual therapy and discussed with patient performing more manual therapy and mobility work in session to help improve his L hip ROM.    Geremias is progressing well towards his goals.   Pt prognosis is Excellent.     Pt will continue to benefit from skilled outpatient physical  therapy to address the deficits listed in the problem list box on initial evaluation, provide pt/family education and to maximize pt's level of independence in the home and community environment.     Pt's spiritual, cultural and educational needs considered and pt agreeable to plan of care and goals.     Anticipated barriers to physical therapy: none    Short Term Goals: In 3 weeks:  1.I with HEP met  2.Patient to demo increased AROM /PROM to 75% in the L hip progressing, not met  3.Patient to demo increase LE strength by 1/2 grade progressing, not met  4.Patient to have decreased pain to 3/10 at all times. Progressing     Long Term Goals: In 6 weeks  1. Patient to perform daily activities including bending without limitation.  2. Patient to demonstrate increased LE strength by 1 grade  3. Patient to have decreased pain to 0/10 with all activities.  5. Patient to score to 0% on the LEFS.      Plan      Manual therapy, hip mobility work, lumbar mobility    Blanca Martin, PT

## 2020-07-01 ENCOUNTER — CLINICAL SUPPORT (OUTPATIENT)
Dept: REHABILITATION | Facility: HOSPITAL | Age: 70
End: 2020-07-01
Attending: INTERNAL MEDICINE
Payer: MEDICARE

## 2020-07-01 DIAGNOSIS — M25.552 LEFT HIP PAIN: ICD-10-CM

## 2020-07-01 PROCEDURE — 97140 MANUAL THERAPY 1/> REGIONS: CPT | Mod: HCNC

## 2020-07-01 PROCEDURE — 97110 THERAPEUTIC EXERCISES: CPT | Mod: HCNC

## 2020-07-01 NOTE — PLAN OF CARE
Outpatient Therapy Updated Plan of Care     Visit Date: 7/1/2020  Name: Geremias Quijano  Clinic Number: [id number]    Therapy Diagnosis:   Encounter Diagnosis   Name Primary?    Left hip pain      Physician: Martínez White MD    Physician Orders: PT Eval and Treat  Medical Diagnosis: Pain of left hip joint  Evaluation Date: 6/3/20    Total Visits Received: 7  Cancelled Visits: 0  No Show Visits: 0    Current Certification Period:  7/1/20 to 8/1/20  Precautions:  Standard  Visits from Evaluation Date:  7  Functional Level Prior to Evaluation:  IND    Subjective     Update: Patient reports that some days he feels like his hip is getting better and other days he feels like it is the same.    Objective     Update:     Gait: normal     Squat: Double leg: decreased ability to get low              Single leg: DNT     Balance: good     Reflex/Sensation: normal     Hip A/PROM:                                                  (L)        (R)                                      Flexion                         75% p!    90%                                      Extension                    50%     50%                                      Abduction                    75%     90%                                      Adduction                    75%     90%                                      External rotation          50%     75%                                      Internal rotation           75%     75%                      Strength:                    Hip flexors                   4+/5     4+/5  Quadriceps                  5/5       5/5                                             Hamstrings                  5/5       5/5                                      Anterior Tibialis           4+/5     4+/5                                      Peroneals                    4+/5     4+/5                                      Gastrocnemius            4+/5     4+/5                                      Adductors                    4+/5      4+/5                                      External rotators         3+/5     3+/5                                      Internal rotators           3+/5     3+/5                                      Gluteus Medius           4/5     4/5                                      Gluteus Edwin        3+/5     3+/5     Joint Mobility: decreased to L hip in all directions     PIVM Lumbar: hypomobile     Muscle Length:          Hamstrings: decreased on both sides                                      Jelani Test: pos on both sides     Special Test:              WHIT                        neg      FADIR                         neg                                      Scouring                      neg           Tenderness to palpation: no tenderness     Lower Limb Tension Test:  negative    Assessment     Update: Patient has demonstrated improvement in pain/adverse symptoms, improvement in strength, and slight improvement in ROM since starting skilled therapy.    Short Term Goals: In 3 weeks:  1.I with HEP met  2.Patient to demo increased AROM /PROM to 75% in the L hip progressing, not met  3.Patient to demo increase LE strength by 1/2 grade progressing, not met  4.Patient to have decreased pain to 3/10 at all times. Progressing     Long Term Goals: In 6 weeks  1. Patient to perform daily activities including bending without limitation. PROGRESSING, NOT MET  2. Patient to demonstrate increased LE strength by 1 grade PROGRESSING, NOT MET  3. Patient to have decreased pain to 0/10 with all activities. PROGRESSING, NOT MET  5. Patient to score to 0% on the LEFS. PROGRESSING, NOT MET    Reasons for Recertification of Therapy:   Patient is still having a lot of difficulty with his L hip and donning/doffing socks, leaning over, and increased stiffness/pain after sitting for long periods. We will start focusing more heavily on manual therapy as well as more mobility drills in order to improve ROM. Patient needs continued skilled  therapy in order to improve ROM, improve muscle extensibility, improve pain/adverse symptoms, and improve strength in order to improve quality of life.    Plan     Updated Certification Period: 7/1/2020 to 8/1/20  Recommended Treatment Plan: 1-2 times per week for 4 weeks: Manual Therapy, Moist Heat/ Ice, Neuromuscular Re-ed, Patient Education, Therapeutic Activites and Therapeutic Exercise  Other Recommendations: JENNIFER Martin, PT  7/1/2020      I CERTIFY THE NEED FOR THESE SERVICES FURNISHED UNDER THIS PLAN OF TREATMENT AND WHILE UNDER MY CARE    Physician's comments:        Physician's Signature: ___________________________________________________

## 2020-07-08 NOTE — PROGRESS NOTES
PHYSICAL THERAPY TREATMENT NOTE    Name: Geremias Quijano  Clinic Number: [id number]    Therapy Diagnosis:   Encounter Diagnosis   Name Primary?    Left hip pain      Physician: Martínez Whiet MD    Visit Date: 7/9/2020    Physician Orders: PT Eval and Treat   Medical Diagnosis from Referral: Pain of left hip joint  Evaluation Date: 6/3/2020  Authorization Period Expiration: 6/2/21  Plan of Care Expiration: 8/1/20  Visit # / Visits authorized: 7/ 30     Time In: 7:45  Time Out: 8:30  Total Billable Time: 45 minutes     Precautions: Standard    Subjective     Pt reports: that his hip was moving better after a couple of days.  He was compliant with home exercise program.  Response to previous treatment: sore  Functional change:improvement in motion    Pain: 0/10   Location: na today    Objective     Geremias received therapeutic exercises to develop strength and flexibility for 20 minutes including:  Upright bike x 6'  Deep squat holding onto pole with thoracic rotations  Prone hip rocks  Quadruped rockback  Cat/cow  Hip FL stretch  Bird dog LE   Seated hip IR    Geremias received manual therapy to improve mobility, decrease pain, and improve motion for 25 minutes including:  PROM to L hip  L hip distraction manip  Hip ER mobs  Posterior/lateral mobs     Home Exercises and Patient Education Provided  Education provided:   - Progression of rehab  -added deep squats with pulses L/R to HEP     Written Home Exercises Provided: yes.  Exercises were reviewed and Geremias was able to demonstrate them prior to the end of the session.  Geremias demonstrated good  understanding of the education provided.      See EMR under Patient Instructions for exercises provided 6/3/2020.  Assessment     Patient demonstrated improvement in ROM by the end of the session and compared to last session. Maybe noted that his anterior capsule is tighter and focused on more manual therapy for that.    Geremias is progressing well towards his goals.   Pt prognosis is  Excellent.     Pt will continue to benefit from skilled outpatient physical therapy to address the deficits listed in the problem list box on initial evaluation, provide pt/family education and to maximize pt's level of independence in the home and community environment.     Pt's spiritual, cultural and educational needs considered and pt agreeable to plan of care and goals.     Anticipated barriers to physical therapy: none    Short Term Goals: In 3 weeks:  1.I with HEP met  2.Patient to demo increased AROM /PROM to 75% in the L hip progressing, not met  3.Patient to demo increase LE strength by 1/2 grade progressing, not met  4.Patient to have decreased pain to 3/10 at all times. Progressing     Long Term Goals: In 6 weeks  1. Patient to perform daily activities including bending without limitation.  2. Patient to demonstrate increased LE strength by 1 grade  3. Patient to have decreased pain to 0/10 with all activities.  5. Patient to score to 0% on the LEFS.      Plan      Manual therapy, hip mobility work, lumbar mobility    Blanca Martin, PT

## 2020-07-09 ENCOUNTER — CLINICAL SUPPORT (OUTPATIENT)
Dept: REHABILITATION | Facility: HOSPITAL | Age: 70
End: 2020-07-09
Attending: INTERNAL MEDICINE
Payer: MEDICARE

## 2020-07-09 DIAGNOSIS — M25.552 LEFT HIP PAIN: ICD-10-CM

## 2020-07-09 PROCEDURE — 97140 MANUAL THERAPY 1/> REGIONS: CPT | Mod: HCNC

## 2020-07-09 PROCEDURE — 97110 THERAPEUTIC EXERCISES: CPT | Mod: HCNC

## 2020-07-13 ENCOUNTER — CLINICAL SUPPORT (OUTPATIENT)
Dept: REHABILITATION | Facility: HOSPITAL | Age: 70
End: 2020-07-13
Attending: INTERNAL MEDICINE
Payer: MEDICARE

## 2020-07-13 DIAGNOSIS — M25.552 LEFT HIP PAIN: ICD-10-CM

## 2020-07-13 PROCEDURE — 97110 THERAPEUTIC EXERCISES: CPT | Mod: HCNC

## 2020-07-13 PROCEDURE — 97140 MANUAL THERAPY 1/> REGIONS: CPT | Mod: HCNC

## 2020-07-13 NOTE — PROGRESS NOTES
PHYSICAL THERAPY TREATMENT NOTE    Name: Geremias Quijano  Clinic Number: [id number]    Therapy Diagnosis:   Encounter Diagnosis   Name Primary?    Left hip pain      Physician: Martínez White MD    Visit Date: 7/13/2020    Physician Orders: PT Eval and Treat   Medical Diagnosis from Referral: Pain of left hip joint  Evaluation Date: 6/3/2020  Authorization Period Expiration: 6/2/21  Plan of Care Expiration: 8/1/20  Visit # / Visits authorized: 8/ 30     Time In: 1:15  Time Out: 2:00  Total Billable Time: 45 minutes     Precautions: Standard    Subjective     Pt reports: that his his has been sore since last session.  He was compliant with home exercise program.  Response to previous treatment: sore  Functional change: improvement in motion    Pain: 0/10   Location: na today    Objective     Geremias received therapeutic exercises to develop strength and flexibility for 20 minutes including:  Upright bike x 6'  Deep squat holding onto pole with thoracic rotations  Prone hip rocks  Quadruped rockback  Cat/cow  Bird dog LE   Seated hip IR on ball and seated ER  DKTC with ball  LTR with ball     Geremias received manual therapy to improve mobility, decrease pain, and improve motion for 25 minutes including:  PROM to L hip  L hip distraction manip  Hip ER mobs  Posterior/lateral mobs  Hip FL stretch     Home Exercises and Patient Education Provided  Education provided:   - Progression of rehab  -added deep squats with pulses L/R to HEP     Written Home Exercises Provided: yes.  Exercises were reviewed and Geremias was able to demonstrate them prior to the end of the session.  Geremias demonstrated good  understanding of the education provided.      See EMR under Patient Instructions for exercises provided 6/3/2020.  Assessment     Patient demonstrated improvement in L hip ROM more into IR by the end of the session and since last treatment.    Geremias is progressing well towards his goals.   Pt prognosis is Excellent.     Pt will continue  to benefit from skilled outpatient physical therapy to address the deficits listed in the problem list box on initial evaluation, provide pt/family education and to maximize pt's level of independence in the home and community environment.     Pt's spiritual, cultural and educational needs considered and pt agreeable to plan of care and goals.     Anticipated barriers to physical therapy: none    Short Term Goals: In 3 weeks:  1.I with HEP met  2.Patient to demo increased AROM /PROM to 75% in the L hip progressing, not met  3.Patient to demo increase LE strength by 1/2 grade progressing, not met  4.Patient to have decreased pain to 3/10 at all times. Progressing     Long Term Goals: In 6 weeks  1. Patient to perform daily activities including bending without limitation.  2. Patient to demonstrate increased LE strength by 1 grade  3. Patient to have decreased pain to 0/10 with all activities.  5. Patient to score to 0% on the LEFS.      Plan      Manual therapy, hip mobility work, lumbar mobility    Blanca Martin, PT

## 2020-07-16 ENCOUNTER — TELEPHONE (OUTPATIENT)
Dept: INTERNAL MEDICINE | Facility: CLINIC | Age: 70
End: 2020-07-16

## 2020-07-16 RX ORDER — IRBESARTAN 300 MG/1
300 TABLET ORAL NIGHTLY
Qty: 90 TABLET | Refills: 3 | Status: SHIPPED | OUTPATIENT
Start: 2020-07-16 | End: 2022-05-18

## 2020-07-23 NOTE — PROGRESS NOTES
PHYSICAL THERAPY TREATMENT NOTE    Name: Geremias Quijano  Clinic Number: [id number]    Therapy Diagnosis:   Encounter Diagnosis   Name Primary?    Left hip pain      Physician: Martínez White MD    Visit Date: 7/24/2020    Physician Orders: PT Eval and Treat   Medical Diagnosis from Referral: Pain of left hip joint  Evaluation Date: 6/3/2020  Authorization Period Expiration: 6/2/21  Plan of Care Expiration: 8/1/20  Visit # / Visits authorized: 9/ 30     Time In: 8:30  Time Out: 9:15  Total Billable Time: 45 minutes     Precautions: Standard    Subjective     Pt reports: that he thinks the manual is helping and wants to increase to 2 x a week  He was compliant with home exercise program.  Response to previous treatment: sore  Functional change: none    Pain: 0/10   Location: na today    Objective     Geremias received therapeutic exercises to develop strength and flexibility for 20 minutes including:  Upright bike x 6'  Deep squat holding with TRX band and hip distraction- double and single leg  Prone hip rocks  Fire hydrants  Cat/cow  Bird dog LE   Seated hip IR on ball and seated ER  DKTC with ball  LTR with ball   Press ups    Geremias received manual therapy to improve mobility, decrease pain, and improve motion for 25 minutes including:  PROM to L hip  L hip distraction manip  Hip ER mobs  Posterior/lateral mobs  Hip FL stretch     Home Exercises and Patient Education Provided  Education provided:   - Progression of rehab  -added deep squats with pulses L/R to HEP     Written Home Exercises Provided: yes.  Exercises were reviewed and Geremias was able to demonstrate them prior to the end of the session.  Geremias demonstrated good  understanding of the education provided.      See EMR under Patient Instructions for exercises provided 7/24/2020.  Assessment     Patient continues to demonstrate improvement in hip ROM by the end of the session, but still struggling with carryover. Updated exercise program at home to help with  carryover.    Geremias is progressing well towards his goals.   Pt prognosis is Excellent.     Pt will continue to benefit from skilled outpatient physical therapy to address the deficits listed in the problem list box on initial evaluation, provide pt/family education and to maximize pt's level of independence in the home and community environment.     Pt's spiritual, cultural and educational needs considered and pt agreeable to plan of care and goals.     Anticipated barriers to physical therapy: none    Short Term Goals: In 3 weeks:  1.I with HEP met  2.Patient to demo increased AROM /PROM to 75% in the L hip progressing, not met  3.Patient to demo increase LE strength by 1/2 grade progressing, not met  4.Patient to have decreased pain to 3/10 at all times. Progressing     Long Term Goals: In 6 weeks  1. Patient to perform daily activities including bending without limitation.  2. Patient to demonstrate increased LE strength by 1 grade  3. Patient to have decreased pain to 0/10 with all activities.  5. Patient to score to 0% on the LEFS.      Plan      Manual therapy, hip mobility work, lumbar mobility    Blanca Martin, PT

## 2020-07-24 ENCOUNTER — CLINICAL SUPPORT (OUTPATIENT)
Dept: REHABILITATION | Facility: HOSPITAL | Age: 70
End: 2020-07-24
Attending: INTERNAL MEDICINE
Payer: MEDICARE

## 2020-07-24 DIAGNOSIS — M25.552 LEFT HIP PAIN: ICD-10-CM

## 2020-07-24 PROCEDURE — 97110 THERAPEUTIC EXERCISES: CPT | Mod: HCNC

## 2020-07-24 PROCEDURE — 97140 MANUAL THERAPY 1/> REGIONS: CPT | Mod: HCNC

## 2020-07-27 ENCOUNTER — CLINICAL SUPPORT (OUTPATIENT)
Dept: REHABILITATION | Facility: HOSPITAL | Age: 70
End: 2020-07-27
Attending: INTERNAL MEDICINE
Payer: MEDICARE

## 2020-07-27 DIAGNOSIS — M25.552 LEFT HIP PAIN: ICD-10-CM

## 2020-07-27 PROCEDURE — 97140 MANUAL THERAPY 1/> REGIONS: CPT | Mod: HCNC

## 2020-07-27 PROCEDURE — 97110 THERAPEUTIC EXERCISES: CPT | Mod: HCNC

## 2020-07-27 NOTE — PROGRESS NOTES
PHYSICAL THERAPY TREATMENT NOTE    Name: Geremias Quijano  Clinic Number: [id number]    Therapy Diagnosis:   Encounter Diagnosis   Name Primary?    Left hip pain      Physician: Martínez White MD    Visit Date: 7/27/2020    Physician Orders: PT Eval and Treat   Medical Diagnosis from Referral: Pain of left hip joint  Evaluation Date: 6/3/2020  Authorization Period Expiration: 6/2/21  Plan of Care Expiration: 8/1/20  Visit # / Visits authorized: 10/ 30     Time In: 2:00  Time Out: 2:45  Total Billable Time: 45 minutes     Precautions: Standard    Subjective     Pt reports: that he feels about the same since last session.  He was compliant with home exercise program.  Response to previous treatment: sore  Functional change: none    Pain: 0/10   Location: na today    Objective     Geremias received therapeutic exercises to develop strength and flexibility for 20 minutes including:  Upright bike x 6'  Deep squat holding with TRX band and hip distraction- double and single leg  Prone hip rocks  Fire hydrants- not today  Cat/cow  Bird dog LE   Split squat  Supine FL against TB  Windshield wipers  Press ups    Geremias received manual therapy to improve mobility, decrease pain, and improve motion for 25 minutes including:  PROM to L hip  L hip distraction manip  Hip ER mobs  Posterior/lateral mobs  Hip FL stretch     Home Exercises and Patient Education Provided  Education provided:   - Progression of rehab  -added deep squats with pulses L/R to HEP     Written Home Exercises Provided: yes.  Exercises were reviewed and Geremias was able to demonstrate them prior to the end of the session.  Geremias demonstrated good  understanding of the education provided.      See EMR under Patient Instructions for exercises provided 7/24/2020.  Assessment     Patient demonstrated more carryover with hip internal rotation this session from last time. Still having some pain with more flexion based movements, but is still improving.    Geremias is progressing  well towards his goals.   Pt prognosis is Excellent.     Pt will continue to benefit from skilled outpatient physical therapy to address the deficits listed in the problem list box on initial evaluation, provide pt/family education and to maximize pt's level of independence in the home and community environment.     Pt's spiritual, cultural and educational needs considered and pt agreeable to plan of care and goals.     Anticipated barriers to physical therapy: none    Short Term Goals: In 3 weeks:  1.I with HEP met  2.Patient to demo increased AROM /PROM to 75% in the L hip progressing, not met  3.Patient to demo increase LE strength by 1/2 grade progressing, not met  4.Patient to have decreased pain to 3/10 at all times. Progressing     Long Term Goals: In 6 weeks  1. Patient to perform daily activities including bending without limitation.  2. Patient to demonstrate increased LE strength by 1 grade  3. Patient to have decreased pain to 0/10 with all activities.  5. Patient to score to 0% on the LEFS.      Plan      Manual therapy, hip mobility work, lumbar mobility    Blanca Martin, PT

## 2020-07-29 NOTE — PROGRESS NOTES
PHYSICAL THERAPY TREATMENT NOTE    Name: Geremias Quijano  Clinic Number: [id number]    Therapy Diagnosis:   Encounter Diagnosis   Name Primary?    Left hip pain      Physician: Martínez White MD    Visit Date: 7/31/2020    Physician Orders: PT Eval and Treat   Medical Diagnosis from Referral: Pain of left hip joint  Evaluation Date: 6/3/2020  Authorization Period Expiration: 6/2/21  Plan of Care Expiration: 8/1/20  Visit # / Visits authorized: 11/ 30     Time In: 8:30  Time Out: 9:15  Total Billable Time: 45 minutes     Precautions: Standard    Subjective     Pt reports: that he was feeling more sore last night and is not sure why.  He was compliant with home exercise program.  Response to previous treatment: sore  Functional change: none    Pain: 2/10   Location: na today    Objective     Geremias received therapeutic exercises to develop strength and flexibility for 25 minutes including:  Upright bike x 6' for joint nutrition  Deep squat holding with TRX band and hip distraction- double and single leg- not today  Prone hip rocks- not today  Fire hydrants against TB  Cat/cow  Deadlift 65#  Hip abduction 55#  Hip adduction 55#   Split squat 2 x 10 each side  Supine FL against TB  cheerapp wipers  Press ups    Geremias received manual therapy to improve mobility, decrease pain, and improve motion for 20 minutes including:  PROM to L hip  L hip distraction manip  Hip ER mobs  Posterior/lateral mobs  Hip FL stretch     Home Exercises and Patient Education Provided  Education provided:   - Progression of rehab  -added deep squats with pulses L/R to HEP     Written Home Exercises Provided: yes.  Exercises were reviewed and Geremias was able to demonstrate them prior to the end of the session.  Geremias demonstrated good  understanding of the education provided.      See EMR under Patient Instructions for exercises provided 7/24/2020.  Assessment     Patient demonstrated better tolerance to strengthening and mobility training this  session with minimal increase in adverse symptoms. Had more symptoms at the beginning of the session, but improved by the end.    Geremias is progressing well towards his goals.   Pt prognosis is Excellent.     Pt will continue to benefit from skilled outpatient physical therapy to address the deficits listed in the problem list box on initial evaluation, provide pt/family education and to maximize pt's level of independence in the home and community environment.     Pt's spiritual, cultural and educational needs considered and pt agreeable to plan of care and goals.     Anticipated barriers to physical therapy: none    Short Term Goals: In 3 weeks:  1.I with HEP met  2.Patient to demo increased AROM /PROM to 75% in the L hip progressing, not met  3.Patient to demo increase LE strength by 1/2 grade progressing, not met  4.Patient to have decreased pain to 3/10 at all times. Progressing     Long Term Goals: In 6 weeks  1. Patient to perform daily activities including bending without limitation.  2. Patient to demonstrate increased LE strength by 1 grade  3. Patient to have decreased pain to 0/10 with all activities.  5. Patient to score to 0% on the LEFS.      Plan      Manual therapy, hip mobility work, lumbar mobility    Blanca Martin, PT

## 2020-07-31 ENCOUNTER — CLINICAL SUPPORT (OUTPATIENT)
Dept: REHABILITATION | Facility: HOSPITAL | Age: 70
End: 2020-07-31
Attending: INTERNAL MEDICINE
Payer: MEDICARE

## 2020-07-31 DIAGNOSIS — M25.552 LEFT HIP PAIN: ICD-10-CM

## 2020-07-31 PROCEDURE — 97110 THERAPEUTIC EXERCISES: CPT | Mod: HCNC

## 2020-07-31 PROCEDURE — 97140 MANUAL THERAPY 1/> REGIONS: CPT | Mod: HCNC

## 2020-07-31 NOTE — PROGRESS NOTES
PHYSICAL THERAPY TREATMENT NOTE    Name: Geremias Quijano  Clinic Number: [id number]    Therapy Diagnosis:   Encounter Diagnosis   Name Primary?    Left hip pain      Physician: Martínez White MD    Visit Date: 8/3/2020    Physician Orders: PT Eval and Treat   Medical Diagnosis from Referral: Pain of left hip joint  Evaluation Date: 6/3/2020  Authorization Period Expiration: 6/2/21  Plan of Care Expiration: 9/3/20  Visit # / Visits authorized: 12/ 30     Time In: 8:00  Time Out: 8:45  Total Billable Time: 45 minutes     Precautions: Standard    Subjective     Pt reports: see in [facility]  He was compliant with home exercise program.  Response to previous treatment: sore  Functional change: none    Pain: 2/10   Location: na today    Objective     Geremias received therapeutic exercises to develop strength and flexibility for 30 minutes including:  Upright bike x 6' for joint nutrition  Deep squat holding with TRX band  Fire hydrants against TB  Cat/cow  Deadlift 65# 3 x 10  Step up with hip drive  Split squat 2 x 10 each side  Supine FL against TB  Windshield wipers  Press ups    Geremias received manual therapy to improve mobility, decrease pain, and improve motion for 15 minutes including:  PROM to L hip  L hip distraction manip  Hip ER mobs  Posterior/lateral mobs  Hip FL stretch     Home Exercises and Patient Education Provided  Education provided:   - Progression of rehab  -added deep squats with pulses L/R to HEP     Written Home Exercises Provided: yes.  Exercises were reviewed and Geremias was able to demonstrate them prior to the end of the session.  Geremias demonstrated good  understanding of the education provided.      See EMR under Patient Instructions for exercises provided 7/24/2020.  Assessment     See in [facility]    Geremias is progressing well towards his goals.   Pt prognosis is Excellent.     Pt will continue to benefit from skilled outpatient physical therapy to address the deficits listed in the problem list box on initial  evaluation, provide pt/family education and to maximize pt's level of independence in the home and community environment.     Pt's spiritual, cultural and educational needs considered and pt agreeable to plan of care and goals.     Anticipated barriers to physical therapy: none    Short Term Goals: In 3 weeks:  1.I with HEP met  2.Patient to demo increased AROM /PROM to 75% in the L hip progressing, not met  3.Patient to demo increase LE strength by 1/2 grade met  4.Patient to have decreased pain to 3/10 at all times. Progressing, not met     Long Term Goals: In 6 weeks  1. Patient to perform daily activities including bending without limitation. Progressing, not met  2. Patient to demonstrate increased LE strength by 1 grade Progressing, not met  3. Patient to have decreased pain to 0/10 with all activities. Progressing, not met  5. Patient to score to 0% on the LEFS.  Progressing, not met     Plan      Manual therapy, hip mobility work, lumbar mobility    Blanca Martin, PT

## 2020-08-03 ENCOUNTER — CLINICAL SUPPORT (OUTPATIENT)
Dept: REHABILITATION | Facility: HOSPITAL | Age: 70
End: 2020-08-03
Payer: MEDICARE

## 2020-08-03 DIAGNOSIS — M25.552 LEFT HIP PAIN: ICD-10-CM

## 2020-08-03 PROCEDURE — 97110 THERAPEUTIC EXERCISES: CPT | Mod: HCNC

## 2020-08-03 PROCEDURE — 97140 MANUAL THERAPY 1/> REGIONS: CPT | Mod: HCNC

## 2020-08-03 NOTE — PLAN OF CARE
Outpatient Therapy Updated Plan of Care     Visit Date: 8/3/2020  Name: Geremias Quijano  Clinic Number: [id number]    Therapy Diagnosis:   Encounter Diagnosis   Name Primary?    Left hip pain      Physician: Martínez White MD    Physician Orders: PT Eval and Treat  Medical Diagnosis: Pain of left hip joint  Evaluation Date: 6/3/20    Total Visits Received: 12  Cancelled Visits: 1  No Show Visits: 0    Current Certification Period:  8/3/20 to 9/3/20  Precautions:  Standard  Visits from Evaluation Date:  12  Functional Level Prior to Evaluation:  IND    Subjective     Update: Patient reports that his motion is getting better, but is still not quite able to reach his foot enough to put his shoe on.    Objective     Update:     Gait: normal     Squat: Double leg: decreased ability to get low              Single leg: DNT     Balance: good     Reflex/Sensation: normal     Hip A/PROM:                                                  (L)        (R)                                      Flexion                         75%     90%                                      Extension                    60%     50%                                      Abduction                    75%     90%                                      Adduction                    75%     90%                                      External rotation          60%     75%                                      Internal rotation           75%     75%                      Strength:                    Hip flexors                   4+/5     4+/5  Quadriceps                  5/5       5/5                                             Hamstrings                  5/5       5/5                                      Anterior Tibialis           4+/5     4+/5                                      Peroneals                    4+/5     4+/5                                      Gastrocnemius            4+/5     4+/5                                      Adductors                     4+/5     4+/5                                      External rotators         4/5     4/5                                      Internal rotators           3+/5     3+/5                                      Gluteus Medius           4/5     4/5                                      Gluteus Edwin        4/5     4/5     Joint Mobility: decreased to L hip in all directions     PIVM Lumbar: hypomobile     Muscle Length:          Hamstrings: decreased on both sides                                      Jelani Test: pos on both sides     Special Test:              WHIT                        neg      FADIR                         neg                                      Scouring                      neg           Tenderness to palpation: no tenderness     Lower Limb Tension Test:  negative    Assessment         CMS Impairment/Limitation/Restriction for FOTO Hip Survey    Therapist reviewed FOTO scores for Geremias Quijano on 8/3/2020.   FOTO documents entered into [employer] - see Media section.    Limitation Score: will assess next visit         Update: Patient has been demonstrating improvement in hip ROM, improvement in tolerance to activities, improvement in pain/adverse symptoms, and improvement in strength since starting skilled therapy.     Short Term Goals: In 3 weeks:  1.I with HEP met  2.Patient to demo increased AROM /PROM to 75% in the L hip progressing, not met  3.Patient to demo increase LE strength by 1/2 grade progressing, not met  4.Patient to have decreased pain to 3/10 at all times. Progressing     Long Term Goals: In 6 weeks  1. Patient to perform daily activities including bending without limitation. PROGRESSING, NOT MET  2. Patient to demonstrate increased LE strength by 1 grade PROGRESSING, NOT MET  3. Patient to have decreased pain to 0/10 with all activities. PROGRESSING, NOT MET  5. Patient to score to 0% on the LEFS. PROGRESSING, NOT MET    Reasons for Recertification of Therapy:  Patient is still having  issues with hip pain after sitting for long periods, decreased hip motion not allowing him to put his shoe on, and decreased tolerance to all activities. Patient needs continued skilled therapy in order to improve ROM, improve strength, decrease pain/adverse symptoms, and improve tolerance to activities in order to improve quality of life.    Plan     Updated Certification Period: 8/3/2020 to 9/3/20  Recommended Treatment Plan: 1-2 times per week for 4 weeks: Manual Therapy, Moist Heat/ Ice, Neuromuscular Re-ed, Patient Education, Therapeutic Activites and Therapeutic Exercise  Other Recommendations: JENNIFER Martin, PT  8/3/2020      I CERTIFY THE NEED FOR THESE SERVICES FURNISHED UNDER THIS PLAN OF TREATMENT AND WHILE UNDER MY CARE    Physician's comments:        Physician's Signature: ___________________________________________________

## 2020-08-05 NOTE — PROGRESS NOTES
PHYSICAL THERAPY TREATMENT NOTE    Name: Geremias Quijano  Clinic Number: [id number]    Therapy Diagnosis:   Encounter Diagnosis   Name Primary?    Left hip pain      Physician: Martínez White MD    Visit Date: 8/7/2020    Physician Orders: PT Eval and Treat   Medical Diagnosis from Referral: Pain of left hip joint  Evaluation Date: 6/3/2020  Authorization Period Expiration: 6/2/21  Plan of Care Expiration: 9/3/20  Visit # / Visits authorized: 13/ 30     Time In: 10:00am  Time Out: 10:45am  Total Billable Time: 45 minutes     Precautions: Standard    Subjective     Pt reports: that he feels about the same as last time.  He was compliant with home exercise program.  Response to previous treatment: sore  Functional change: none    Pain: 2/10   Location: na today    Objective     Geremias received therapeutic exercises to develop strength and flexibility for 30 minutes including:  Upright bike x 6' for joint nutrition  Fire hydrants against TB  Cat/cow  Deadlift 65# 3 x 10  Step up with hip drive  Split squat 2 x 10 each side  Supine FL against TB  Windshield wipers  Press ups    Geremias received manual therapy to improve mobility, decrease pain, and improve motion for 15 minutes including:  PROM to L hip  L hip distraction manip  Hip ER mobs  Posterior/lateral mobs  Hip FL stretch     Home Exercises and Patient Education Provided  Education provided:   - Progression of rehab  -added deep squats with pulses L/R to HEP     Written Home Exercises Provided: yes.  Exercises were reviewed and Geremias was able to demonstrate them prior to the end of the session.  Geremias demonstrated good  understanding of the education provided.      See EMR under Patient Instructions for exercises provided 7/24/2020.  Assessment     Patient is continuing to show improvement, but slow. Improvement in L hip ROM in the last 2 weeks and improvement in pain as well.     Geremias is progressing well towards his goals.   Pt prognosis is Excellent.     Pt will  continue to benefit from skilled outpatient physical therapy to address the deficits listed in the problem list box on initial evaluation, provide pt/family education and to maximize pt's level of independence in the home and community environment.     Pt's spiritual, cultural and educational needs considered and pt agreeable to plan of care and goals.     Anticipated barriers to physical therapy: none    Short Term Goals: In 3 weeks:  1.I with HEP met  2.Patient to demo increased AROM /PROM to 75% in the L hip progressing, not met  3.Patient to demo increase LE strength by 1/2 grade met  4.Patient to have decreased pain to 3/10 at all times. Progressing, not met     Long Term Goals: In 6 weeks  1. Patient to perform daily activities including bending without limitation. Progressing, not met  2. Patient to demonstrate increased LE strength by 1 grade Progressing, not met  3. Patient to have decreased pain to 0/10 with all activities. Progressing, not met  5. Patient to score to 0% on the LEFS.  Progressing, not met     Plan      Manual therapy, hip mobility work, lumbar mobility    Blanca Martin, PT

## 2020-08-07 ENCOUNTER — CLINICAL SUPPORT (OUTPATIENT)
Dept: REHABILITATION | Facility: HOSPITAL | Age: 70
End: 2020-08-07
Payer: MEDICARE

## 2020-08-07 DIAGNOSIS — M25.552 LEFT HIP PAIN: ICD-10-CM

## 2020-08-07 PROCEDURE — 97140 MANUAL THERAPY 1/> REGIONS: CPT | Mod: HCNC

## 2020-08-07 PROCEDURE — 97110 THERAPEUTIC EXERCISES: CPT | Mod: HCNC

## 2020-08-13 NOTE — PROGRESS NOTES
PHYSICAL THERAPY TREATMENT NOTE    Name: Geremias Quijano  Clinic Number: [id number]    Therapy Diagnosis:   Encounter Diagnosis   Name Primary?    Left hip pain      Physician: Martínez White MD    Visit Date: 8/14/2020    Physician Orders: PT Eval and Treat   Medical Diagnosis from Referral: Pain of left hip joint  Evaluation Date: 6/3/2020  Authorization Period Expiration: 6/2/21  Plan of Care Expiration: 9/3/20  Visit # / Visits authorized: 13/ 30     Time In: 8:15 am  Time Out: 9:00 am  Total Billable Time: 45 minutes     Precautions: Standard    Subjective     Pt reports: that he has been doing better this week and was able to touch his toes.  He was compliant with home exercise program.  Response to previous treatment: sore  Functional change: improvement in ROM    Pain: 2/10   Location: na today    Objective     Geremias received therapeutic exercises to develop strength and flexibility for 30 minutes including:  Upright bike x 6' for joint nutrition  Bird dog LE holds  Cat/cow  Deadlift 85# 2 x 10  Step up with hip drive  Deep squats against pole  Walking lunges  [employer] wipers  Press ups  KB swings 30#  Prone glut lift    Geremias received manual therapy to improve mobility, decrease pain, and improve motion for 15 minutes including:  PROM to L hip  L hip distraction manip  Hip ER mobs  Posterior/lateral mobs  Hip FL stretch     Home Exercises and Patient Education Provided  Education provided:   - Progression of rehab  -added deep squats with pulses L/R to HEP     Written Home Exercises Provided: yes.  Exercises were reviewed and Geremias was able to demonstrate them prior to the end of the session.  Geremias demonstrated good  understanding of the education provided.      See EMR under Patient Instructions for exercises provided 7/24/2020.  Assessment     Patient demonstrated improvement in mobility since last session. Still having some difficulty with hip FL, but improving along with his strength.    Geremias is  progressing well towards his goals.   Pt prognosis is Excellent.     Pt will continue to benefit from skilled outpatient physical therapy to address the deficits listed in the problem list box on initial evaluation, provide pt/family education and to maximize pt's level of independence in the home and community environment.     Pt's spiritual, cultural and educational needs considered and pt agreeable to plan of care and goals.     Anticipated barriers to physical therapy: none    Short Term Goals: In 3 weeks:  1.I with HEP met  2.Patient to demo increased AROM /PROM to 75% in the L hip progressing, not met  3.Patient to demo increase LE strength by 1/2 grade met  4.Patient to have decreased pain to 3/10 at all times. Progressing, not met     Long Term Goals: In 6 weeks  1. Patient to perform daily activities including bending without limitation. Progressing, not met  2. Patient to demonstrate increased LE strength by 1 grade Progressing, not met  3. Patient to have decreased pain to 0/10 with all activities. Progressing, not met  5. Patient to score to 0% on the LEFS.  Progressing, not met     Plan      Manual therapy, hip mobility work, lumbar mobility    Blanca Martin, PT

## 2020-08-14 ENCOUNTER — CLINICAL SUPPORT (OUTPATIENT)
Dept: REHABILITATION | Facility: HOSPITAL | Age: 70
End: 2020-08-14
Payer: MEDICARE

## 2020-08-14 DIAGNOSIS — M25.552 LEFT HIP PAIN: ICD-10-CM

## 2020-08-14 PROCEDURE — 97110 THERAPEUTIC EXERCISES: CPT | Mod: HCNC

## 2020-08-14 PROCEDURE — 97140 MANUAL THERAPY 1/> REGIONS: CPT | Mod: HCNC

## 2020-08-21 ENCOUNTER — CLINICAL SUPPORT (OUTPATIENT)
Dept: REHABILITATION | Facility: HOSPITAL | Age: 70
End: 2020-08-21
Payer: MEDICARE

## 2020-08-21 DIAGNOSIS — M25.552 LEFT HIP PAIN: ICD-10-CM

## 2020-08-21 PROCEDURE — 97110 THERAPEUTIC EXERCISES: CPT | Mod: HCNC

## 2020-08-21 PROCEDURE — 97140 MANUAL THERAPY 1/> REGIONS: CPT | Mod: HCNC

## 2020-08-21 NOTE — PROGRESS NOTES
PHYSICAL THERAPY TREATMENT NOTE    Name: Geremias Quijano  Clinic Number: [id number]    Therapy Diagnosis:   Encounter Diagnosis   Name Primary?    Left hip pain      Physician: Martínez White MD    Visit Date: 8/21/2020    Physician Orders: PT Eval and Treat   Medical Diagnosis from Referral: Pain of left hip joint  Evaluation Date: 6/3/2020  Authorization Period Expiration: 6/2/21  Plan of Care Expiration: 9/3/20  Visit # / Visits authorized: 14/ 30     Time In: 7:30 am  Time Out: 8:15 am  Total Billable Time: 45 minutes     Precautions: Standard    Subjective     Pt reports: that he is feeling about the same as last session.  He was compliant with home exercise program.  Response to previous treatment: sore  Functional change: none    Pain: 2/10   Location: na today    Objective     Geremias received therapeutic exercises to develop strength and flexibility for 30 minutes including:  Upright bike x 6' for joint nutrition  Bird dog LE holds  Cat/cow  Deadlift 85# 2 x 10- not today  Step up with hip drive  Deep squats against ball  Kneeling hip FL stretch  Windshield wipers  SL bridge  Double bridge to full hip extension  KB swings 30#-  Not today  Prone glut lift- not today    Geremias received manual therapy to improve mobility, decrease pain, and improve motion for 15 minutes including:  PROM to L hip  L hip distraction manip  Hip ER mobs  Posterior/lateral mobs  Hip FL stretch     Home Exercises and Patient Education Provided  Education provided:   - Progression of rehab  -added deep squats with pulses L/R to HEP     Written Home Exercises Provided: yes.  Exercises were reviewed and Geremias was able to demonstrate them prior to the end of the session.  Geremias demonstrated good  understanding of the education provided.      See EMR under Patient Instructions for exercises provided 7/24/2020.  Assessment     Patient demonstrated better tolerance to strengthening this session. Still difficulty with internal and external ROM,  but slowly improving.    Geremias is progressing well towards his goals.   Pt prognosis is Excellent.     Pt will continue to benefit from skilled outpatient physical therapy to address the deficits listed in the problem list box on initial evaluation, provide pt/family education and to maximize pt's level of independence in the home and community environment.     Pt's spiritual, cultural and educational needs considered and pt agreeable to plan of care and goals.     Anticipated barriers to physical therapy: none    Short Term Goals: In 3 weeks:  1.I with HEP met  2.Patient to demo increased AROM /PROM to 75% in the L hip progressing, not met  3.Patient to demo increase LE strength by 1/2 grade met  4.Patient to have decreased pain to 3/10 at all times. Progressing, not met     Long Term Goals: In 6 weeks  1. Patient to perform daily activities including bending without limitation. Progressing, not met  2. Patient to demonstrate increased LE strength by 1 grade Progressing, not met  3. Patient to have decreased pain to 0/10 with all activities. Progressing, not met  5. Patient to score to 0% on the LEFS.  Progressing, not met     Plan      Manual therapy, hip mobility work, lumbar mobility    Blanca Martin, PT

## 2020-08-27 NOTE — PROGRESS NOTES
PHYSICAL THERAPY TREATMENT NOTE    Name: Geremias Quijano  Clinic Number: [id number]    Therapy Diagnosis:   Encounter Diagnosis   Name Primary?    Left hip pain      Physician: Martínez White MD    Visit Date: 8/28/2020    Physician Orders: PT Eval and Treat   Medical Diagnosis from Referral: Pain of left hip joint  Evaluation Date: 6/3/2020  Authorization Period Expiration: 6/2/21  Plan of Care Expiration: 9/3/20  Visit # / Visits authorized: 15/ 30     Time In: 7:40 am  Time Out: 8:20 am  Total Billable Time: 40 minutes     Precautions: Standard    Subjective     Pt reports: that his pain is continuing to feel better, but still not able to reach his shoe.  He was compliant with home exercise program.  Response to previous treatment: sore  Functional change: none    Pain: 2/10   Location: na today    Objective     Geremias received therapeutic exercises to develop strength and flexibility for 30 minutes including:  Upright bike x 6' for joint nutrition  Bird dog LE holds  Cat/cow  LTR  Quadruped rockback  Step up with hip drive  Deep squats against ball  Kneeling hip FL stretch  Windshield wipers  SL bridge  Double bridge to full hip extension  KB swings 30#    Geremias received manual therapy to improve mobility, decrease pain, and improve motion for 10 minutes including:  PROM to L hip  L hip distraction manip  Hip ER mobs  Posterior/lateral mobs  Hip FL stretch     Home Exercises and Patient Education Provided  Education provided:   - Progression of rehab  -added deep squats with pulses L/R to HEP     Written Home Exercises Provided: yes.  Exercises were reviewed and Geremias was able to demonstrate them prior to the end of the session.  Geremias demonstrated good  understanding of the education provided.      See EMR under Patient Instructions for exercises provided 7/24/2020.  Assessment     Patient demonstrated good tolerance to strengthening and more mobility training this session. Still having issues with not being able  to reach his toe, but mobility continuing to slowly improve.     Geremias is progressing well towards his goals.   Pt prognosis is Excellent.     Pt will continue to benefit from skilled outpatient physical therapy to address the deficits listed in the problem list box on initial evaluation, provide pt/family education and to maximize pt's level of independence in the home and community environment.     Pt's spiritual, cultural and educational needs considered and pt agreeable to plan of care and goals.     Anticipated barriers to physical therapy: none    Short Term Goals: In 3 weeks:  1.I with HEP met  2.Patient to demo increased AROM /PROM to 75% in the L hip progressing, not met  3.Patient to demo increase LE strength by 1/2 grade met  4.Patient to have decreased pain to 3/10 at all times. Progressing, not met     Long Term Goals: In 6 weeks  1. Patient to perform daily activities including bending without limitation. Progressing, not met  2. Patient to demonstrate increased LE strength by 1 grade Progressing, not met  3. Patient to have decreased pain to 0/10 with all activities. Progressing, not met  5. Patient to score to 0% on the LEFS.  Progressing, not met     Plan      Manual therapy, hip mobility work, lumbar mobility    Blanca Martin, PT

## 2020-08-28 ENCOUNTER — CLINICAL SUPPORT (OUTPATIENT)
Dept: REHABILITATION | Facility: HOSPITAL | Age: 70
End: 2020-08-28
Payer: MEDICARE

## 2020-08-28 DIAGNOSIS — M25.552 LEFT HIP PAIN: ICD-10-CM

## 2020-08-28 PROCEDURE — 97110 THERAPEUTIC EXERCISES: CPT | Mod: HCNC

## 2020-08-28 PROCEDURE — 97140 MANUAL THERAPY 1/> REGIONS: CPT | Mod: HCNC

## 2020-11-10 ENCOUNTER — DOCUMENTATION ONLY (OUTPATIENT)
Dept: REHABILITATION | Facility: HOSPITAL | Age: 70
End: 2020-11-10

## 2020-11-10 NOTE — PROGRESS NOTES
Outpatient Therapy Discharge Summary     Name: Geremias Quijano  [facility] Number: [id number]    Therapy Diagnosis:    Left hip pain     Physician: Martínez White MD    Physician Orders: PT Eval and Treat   Medical Diagnosis from Referral: Pain of left hip joint  Evaluation Date: 6/3/2020    Date of Last visit: 8/28/20  Total Visits Received: 15  Cancelled Visits: 2  No Show Visits: 0    Assessment    Goals:     Short Term Goals: In 3 weeks:  1.I with HEP met  2.Patient to demo increased AROM /PROM to 75% in the L hip progressing, not met  3.Patient to demo increase LE strength by 1/2 grade met  4.Patient to have decreased pain to 3/10 at all times. Progressing, not met     Long Term Goals: In 6 weeks  1. Patient to perform daily activities including bending without limitation. Progressing, not met  2. Patient to demonstrate increased LE strength by 1 grade Progressing, not met  3. Patient to have decreased pain to 0/10 with all activities. Progressing, not met  5. Patient to score to 0% on the LEFS.  Progressing, not met    Discharge reason: Patient has reached the maximum rehab potential for the present time    Plan   This patient is discharged from Physical Therapy

## 2020-11-17 ENCOUNTER — PES CALL (OUTPATIENT)
Dept: ADMINISTRATIVE | Facility: CLINIC | Age: 70
End: 2020-11-17

## 2020-11-27 ENCOUNTER — LAB VISIT (OUTPATIENT)
Dept: LAB | Facility: HOSPITAL | Age: 70
End: 2020-11-27
Attending: INTERNAL MEDICINE
Payer: MEDICARE

## 2020-11-27 DIAGNOSIS — I10 ESSENTIAL HYPERTENSION: ICD-10-CM

## 2020-11-27 DIAGNOSIS — Z12.5 PROSTATE CANCER SCREENING: ICD-10-CM

## 2020-11-27 LAB
ALBUMIN SERPL BCP-MCNC: 3.8 G/DL (ref 3.5–5.2)
ALP SERPL-CCNC: 107 U/L (ref 55–135)
ALT SERPL W/O P-5'-P-CCNC: 32 U/L (ref 10–44)
ANION GAP SERPL CALC-SCNC: 10 MMOL/L (ref 8–16)
AST SERPL-CCNC: 25 U/L (ref 10–40)
BASOPHILS # BLD AUTO: 0.07 K/UL (ref 0–0.2)
BASOPHILS NFR BLD: 1.1 % (ref 0–1.9)
BILIRUB SERPL-MCNC: 0.7 MG/DL (ref 0.1–1)
BUN SERPL-MCNC: 20 MG/DL (ref 8–23)
CALCIUM SERPL-MCNC: 9.2 MG/DL (ref 8.7–10.5)
CHLORIDE SERPL-SCNC: 105 MMOL/L (ref 95–110)
CHOLEST SERPL-MCNC: 193 MG/DL (ref 120–199)
CHOLEST/HDLC SERPL: 3.9 {RATIO} (ref 2–5)
CO2 SERPL-SCNC: 24 MMOL/L (ref 23–29)
COMPLEXED PSA SERPL-MCNC: 1.3 NG/ML (ref 0–4)
CREAT SERPL-MCNC: 1.1 MG/DL (ref 0.5–1.4)
DIFFERENTIAL METHOD: ABNORMAL
EOSINOPHIL # BLD AUTO: 0.3 K/UL (ref 0–0.5)
EOSINOPHIL NFR BLD: 3.8 % (ref 0–8)
ERYTHROCYTE [DISTWIDTH] IN BLOOD BY AUTOMATED COUNT: 13.2 % (ref 11.5–14.5)
EST. GFR  (AFRICAN AMERICAN): >60 ML/MIN/1.73 M^2
EST. GFR  (NON AFRICAN AMERICAN): >60 ML/MIN/1.73 M^2
GLUCOSE SERPL-MCNC: 98 MG/DL (ref 70–110)
HCT VFR BLD AUTO: 49.7 % (ref 40–54)
HDLC SERPL-MCNC: 50 MG/DL (ref 40–75)
HDLC SERPL: 25.9 % (ref 20–50)
HGB BLD-MCNC: 15.6 G/DL (ref 14–18)
IMM GRANULOCYTES # BLD AUTO: 0.01 K/UL (ref 0–0.04)
IMM GRANULOCYTES NFR BLD AUTO: 0.2 % (ref 0–0.5)
LDLC SERPL CALC-MCNC: 124.2 MG/DL (ref 63–159)
LYMPHOCYTES # BLD AUTO: 1.5 K/UL (ref 1–4.8)
LYMPHOCYTES NFR BLD: 23.2 % (ref 18–48)
MCH RBC QN AUTO: 28.2 PG (ref 27–31)
MCHC RBC AUTO-ENTMCNC: 31.4 G/DL (ref 32–36)
MCV RBC AUTO: 90 FL (ref 82–98)
MONOCYTES # BLD AUTO: 0.6 K/UL (ref 0.3–1)
MONOCYTES NFR BLD: 8.7 % (ref 4–15)
NEUTROPHILS # BLD AUTO: 4.1 K/UL (ref 1.8–7.7)
NEUTROPHILS NFR BLD: 63 % (ref 38–73)
NONHDLC SERPL-MCNC: 143 MG/DL
NRBC BLD-RTO: 0 /100 WBC
PLATELET # BLD AUTO: 276 K/UL (ref 150–350)
PMV BLD AUTO: 10.7 FL (ref 9.2–12.9)
POTASSIUM SERPL-SCNC: 4.5 MMOL/L (ref 3.5–5.1)
PROT SERPL-MCNC: 7.2 G/DL (ref 6–8.4)
RBC # BLD AUTO: 5.54 M/UL (ref 4.6–6.2)
SODIUM SERPL-SCNC: 139 MMOL/L (ref 136–145)
TRIGL SERPL-MCNC: 94 MG/DL (ref 30–150)
TSH SERPL DL<=0.005 MIU/L-ACNC: 0.7 UIU/ML (ref 0.4–4)
WBC # BLD AUTO: 6.54 K/UL (ref 3.9–12.7)

## 2020-11-27 PROCEDURE — 84153 ASSAY OF PSA TOTAL: CPT | Mod: HCNC

## 2020-11-27 PROCEDURE — 85025 COMPLETE CBC W/AUTO DIFF WBC: CPT | Mod: HCNC

## 2020-11-27 PROCEDURE — 36415 COLL VENOUS BLD VENIPUNCTURE: CPT | Mod: HCNC,PO

## 2020-11-27 PROCEDURE — 84443 ASSAY THYROID STIM HORMONE: CPT | Mod: HCNC

## 2020-11-27 PROCEDURE — 80053 COMPREHEN METABOLIC PANEL: CPT | Mod: HCNC

## 2020-11-27 PROCEDURE — 80061 LIPID PANEL: CPT | Mod: HCNC

## 2020-12-04 ENCOUNTER — OFFICE VISIT (OUTPATIENT)
Dept: INTERNAL MEDICINE | Facility: CLINIC | Age: 70
End: 2020-12-04
Payer: MEDICARE

## 2020-12-04 VITALS
OXYGEN SATURATION: 98 % | HEIGHT: 72 IN | SYSTOLIC BLOOD PRESSURE: 132 MMHG | HEART RATE: 68 BPM | DIASTOLIC BLOOD PRESSURE: 66 MMHG | RESPIRATION RATE: 20 BRPM | TEMPERATURE: 98 F | WEIGHT: 244.5 LBS | BODY MASS INDEX: 33.12 KG/M2

## 2020-12-04 DIAGNOSIS — Z00.00 ROUTINE GENERAL MEDICAL EXAMINATION AT A HEALTH CARE FACILITY: Primary | ICD-10-CM

## 2020-12-04 DIAGNOSIS — I10 ESSENTIAL HYPERTENSION: ICD-10-CM

## 2020-12-04 DIAGNOSIS — Z86.010 HISTORY OF COLON POLYPS: ICD-10-CM

## 2020-12-04 DIAGNOSIS — E78.00 PURE HYPERCHOLESTEROLEMIA: ICD-10-CM

## 2020-12-04 PROCEDURE — G0008 FLU VACCINE - QUADRIVALENT - ADJUVANTED: ICD-10-PCS | Mod: HCNC,S$GLB,, | Performed by: INTERNAL MEDICINE

## 2020-12-04 PROCEDURE — 99397 PR PREVENTIVE VISIT,EST,65 & OVER: ICD-10-PCS | Mod: 25,HCNC,S$GLB, | Performed by: INTERNAL MEDICINE

## 2020-12-04 PROCEDURE — 3078F PR MOST RECENT DIASTOLIC BLOOD PRESSURE < 80 MM HG: ICD-10-PCS | Mod: HCNC,CPTII,S$GLB, | Performed by: INTERNAL MEDICINE

## 2020-12-04 PROCEDURE — 99999 PR PBB SHADOW E&M-EST. PATIENT-LVL III: CPT | Mod: PBBFAC,HCNC,, | Performed by: INTERNAL MEDICINE

## 2020-12-04 PROCEDURE — 3075F PR MOST RECENT SYSTOLIC BLOOD PRESS GE 130-139MM HG: ICD-10-PCS | Mod: HCNC,CPTII,S$GLB, | Performed by: INTERNAL MEDICINE

## 2020-12-04 PROCEDURE — 3078F DIAST BP <80 MM HG: CPT | Mod: HCNC,CPTII,S$GLB, | Performed by: INTERNAL MEDICINE

## 2020-12-04 PROCEDURE — 3008F PR BODY MASS INDEX (BMI) DOCUMENTED: ICD-10-PCS | Mod: HCNC,CPTII,S$GLB, | Performed by: INTERNAL MEDICINE

## 2020-12-04 PROCEDURE — 99397 PER PM REEVAL EST PAT 65+ YR: CPT | Mod: 25,HCNC,S$GLB, | Performed by: INTERNAL MEDICINE

## 2020-12-04 PROCEDURE — 90694 VACC AIIV4 NO PRSRV 0.5ML IM: CPT | Mod: HCNC,S$GLB,, | Performed by: INTERNAL MEDICINE

## 2020-12-04 PROCEDURE — 1126F AMNT PAIN NOTED NONE PRSNT: CPT | Mod: HCNC,S$GLB,, | Performed by: INTERNAL MEDICINE

## 2020-12-04 PROCEDURE — 1126F PR PAIN SEVERITY QUANTIFIED, NO PAIN PRESENT: ICD-10-PCS | Mod: HCNC,S$GLB,, | Performed by: INTERNAL MEDICINE

## 2020-12-04 PROCEDURE — 3075F SYST BP GE 130 - 139MM HG: CPT | Mod: HCNC,CPTII,S$GLB, | Performed by: INTERNAL MEDICINE

## 2020-12-04 PROCEDURE — 3008F BODY MASS INDEX DOCD: CPT | Mod: HCNC,CPTII,S$GLB, | Performed by: INTERNAL MEDICINE

## 2020-12-04 PROCEDURE — G0008 ADMIN INFLUENZA VIRUS VAC: HCPCS | Mod: HCNC,S$GLB,, | Performed by: INTERNAL MEDICINE

## 2020-12-04 PROCEDURE — 90694 FLU VACCINE - QUADRIVALENT - ADJUVANTED: ICD-10-PCS | Mod: HCNC,S$GLB,, | Performed by: INTERNAL MEDICINE

## 2020-12-04 PROCEDURE — 99999 PR PBB SHADOW E&M-EST. PATIENT-LVL III: ICD-10-PCS | Mod: PBBFAC,HCNC,, | Performed by: INTERNAL MEDICINE

## 2020-12-04 NOTE — PROGRESS NOTES
"HPI:  Patient is a 70-year-old man who comes today for follow-up of his hypertension, lipids, and for his annual physical.  He is doing very well.  He has no problems or complaints.  His blood pressure home with been very well controlled.      Current MEDS: medcard review, verified and update  Allergies: Per the electronic medical record    Past Medical History:   Diagnosis Date    History of colon polyps     HTN (hypertension)     Hyperlipemia        Past Surgical History:   Procedure Laterality Date    COLONOSCOPY N/A 11/13/2015    Procedure: COLONOSCOPY;  Surgeon: Layo Hernandez III, MD;  Location: [facility];  Service: Endoscopy;  Laterality: N/A;    COLONOSCOPY N/A 8/29/2018    Procedure: COLONOSCOPY;  Surgeon: Layo Hernandez III, MD;  Location: [facility];  Service: Endoscopy;  Laterality: N/A;    NASAL SEPTUM SURGERY         SHx: per the electronic medical record    FHx: recorded in the electronic medical record    ROS:    denies any chest pains or shortness of breath. Denies any nausea, vomiting or diarrhea. Denies any fever, chills or sweats. Denies any change in weight, voice, stool, skin or hair. Denies any dysuria, dyspepsia or dysphagia. Denies any change in vision, hearing or headaches. Denies any swollen lymph nodes or loss of memory.    PE:  /66   Pulse 68   Temp 98.1 °F (36.7 °C) (Temporal)   Resp 20   Ht 5' 11.5" (1.816 m)   Wt 110.9 kg (244 lb 7.8 oz)   SpO2 98%   BMI 33.62 kg/m²   Gen: Well-developed, well-nourished, male, in no acute distress, oriented x3  HEENT: neck is supple, no adenopathy, carotids 2+ equal without bruits, thyroid exam normal size without nodules.  CHEST: clear to auscultation and percussion  CVS: regular rate and rhythm without significant murmur, gallop, or rubs  ABD: soft, benign, no rebound no guarding, no distention.  Bowel sounds are normal.     nontender.  No palpable masses.  No organomegaly and no audible bruits.  RECTAL: no masses.  Prostate  " 30 Grams without nodules.  EXT: no clubbing, cyanosis, or edema  LYMPH: no cervical, inguinal, or axillary adenopathy  FEET: no loss of sensation.  No ulcers or pressure sores.  NEURO: gait normal.  Cranial nerves II- XII intact. No nystagmus.  Speech normal.   Gross motor and sensory unremarkable.    Lab Results   Component Value Date    WBC 6.54 11/27/2020    HGB 15.6 11/27/2020    HCT 49.7 11/27/2020     11/27/2020    CHOL 193 11/27/2020    TRIG 94 11/27/2020    HDL 50 11/27/2020    ALT 32 11/27/2020    AST 25 11/27/2020     11/27/2020    K 4.5 11/27/2020     11/27/2020    CREATININE 1.1 11/27/2020    BUN 20 11/27/2020    CO2 24 11/27/2020    TSH 0.698 11/27/2020    PSA 1.3 11/27/2020       Impression:   stable medical problems below  Patient Active Problem List   Diagnosis    HTN (hypertension)    Hyperlipemia    History of colon polyps    Left hip pain       Plan:   Orders Placed This Encounter    Influenza - Quadrivalent (Adjuvanted)    Lipid Panel    Basic Metabolic Panel    Medications remain the same.  He was given a flu vaccine.  He will be seen again in 6 months with the above lab work.    This note is generated with speech recognition software and is subject to transcription error and sound alike phrases that may be missed by proofreading.

## 2021-03-08 ENCOUNTER — PES CALL (OUTPATIENT)
Dept: ADMINISTRATIVE | Facility: CLINIC | Age: 71
End: 2021-03-08

## 2021-03-16 ENCOUNTER — OFFICE VISIT (OUTPATIENT)
Dept: INTERNAL MEDICINE | Facility: CLINIC | Age: 71
End: 2021-03-16
Payer: MEDICARE

## 2021-03-16 VITALS
SYSTOLIC BLOOD PRESSURE: 130 MMHG | BODY MASS INDEX: 33.43 KG/M2 | RESPIRATION RATE: 20 BRPM | WEIGHT: 238.75 LBS | TEMPERATURE: 98 F | HEIGHT: 71 IN | OXYGEN SATURATION: 97 % | HEART RATE: 67 BPM | DIASTOLIC BLOOD PRESSURE: 70 MMHG

## 2021-03-16 DIAGNOSIS — I10 ESSENTIAL HYPERTENSION: ICD-10-CM

## 2021-03-16 DIAGNOSIS — E78.00 PURE HYPERCHOLESTEROLEMIA: ICD-10-CM

## 2021-03-16 DIAGNOSIS — M25.552 LEFT HIP PAIN: ICD-10-CM

## 2021-03-16 DIAGNOSIS — Z00.00 ENCOUNTER FOR PREVENTIVE HEALTH EXAMINATION: Primary | ICD-10-CM

## 2021-03-16 PROCEDURE — 3008F PR BODY MASS INDEX (BMI) DOCUMENTED: ICD-10-PCS | Mod: CPTII,S$GLB,, | Performed by: NURSE PRACTITIONER

## 2021-03-16 PROCEDURE — G0439 PPPS, SUBSEQ VISIT: HCPCS | Mod: S$GLB,,, | Performed by: NURSE PRACTITIONER

## 2021-03-16 PROCEDURE — 1101F PT FALLS ASSESS-DOCD LE1/YR: CPT | Mod: CPTII,S$GLB,, | Performed by: NURSE PRACTITIONER

## 2021-03-16 PROCEDURE — 1158F PR ADVANCE CARE PLANNING DISCUSS DOCUMENTED IN MEDICAL RECORD: ICD-10-PCS | Mod: S$GLB,,, | Performed by: NURSE PRACTITIONER

## 2021-03-16 PROCEDURE — 3288F FALL RISK ASSESSMENT DOCD: CPT | Mod: CPTII,S$GLB,, | Performed by: NURSE PRACTITIONER

## 2021-03-16 PROCEDURE — 3075F PR MOST RECENT SYSTOLIC BLOOD PRESS GE 130-139MM HG: ICD-10-PCS | Mod: CPTII,S$GLB,, | Performed by: NURSE PRACTITIONER

## 2021-03-16 PROCEDURE — 1158F ADVNC CARE PLAN TLK DOCD: CPT | Mod: S$GLB,,, | Performed by: NURSE PRACTITIONER

## 2021-03-16 PROCEDURE — 1101F PR PT FALLS ASSESS DOC 0-1 FALLS W/OUT INJ PAST YR: ICD-10-PCS | Mod: CPTII,S$GLB,, | Performed by: NURSE PRACTITIONER

## 2021-03-16 PROCEDURE — 99999 PR PBB SHADOW E&M-EST. PATIENT-LVL IV: ICD-10-PCS | Mod: PBBFAC,,, | Performed by: NURSE PRACTITIONER

## 2021-03-16 PROCEDURE — 3008F BODY MASS INDEX DOCD: CPT | Mod: CPTII,S$GLB,, | Performed by: NURSE PRACTITIONER

## 2021-03-16 PROCEDURE — 3075F SYST BP GE 130 - 139MM HG: CPT | Mod: CPTII,S$GLB,, | Performed by: NURSE PRACTITIONER

## 2021-03-16 PROCEDURE — 3078F PR MOST RECENT DIASTOLIC BLOOD PRESSURE < 80 MM HG: ICD-10-PCS | Mod: CPTII,S$GLB,, | Performed by: NURSE PRACTITIONER

## 2021-03-16 PROCEDURE — 99999 PR PBB SHADOW E&M-EST. PATIENT-LVL IV: CPT | Mod: PBBFAC,,, | Performed by: NURSE PRACTITIONER

## 2021-03-16 PROCEDURE — 3078F DIAST BP <80 MM HG: CPT | Mod: CPTII,S$GLB,, | Performed by: NURSE PRACTITIONER

## 2021-03-16 PROCEDURE — G0439 PR MEDICARE ANNUAL WELLNESS SUBSEQUENT VISIT: ICD-10-PCS | Mod: S$GLB,,, | Performed by: NURSE PRACTITIONER

## 2021-03-16 PROCEDURE — 3288F PR FALLS RISK ASSESSMENT DOCUMENTED: ICD-10-PCS | Mod: CPTII,S$GLB,, | Performed by: NURSE PRACTITIONER

## 2021-05-28 ENCOUNTER — LAB VISIT (OUTPATIENT)
Dept: LAB | Facility: HOSPITAL | Age: 71
End: 2021-05-28
Attending: INTERNAL MEDICINE
Payer: MEDICARE

## 2021-05-28 DIAGNOSIS — I10 ESSENTIAL HYPERTENSION: ICD-10-CM

## 2021-05-28 LAB
ANION GAP SERPL CALC-SCNC: 9 MMOL/L (ref 8–16)
BUN SERPL-MCNC: 19 MG/DL (ref 8–23)
CALCIUM SERPL-MCNC: 9.5 MG/DL (ref 8.7–10.5)
CHLORIDE SERPL-SCNC: 107 MMOL/L (ref 95–110)
CHOLEST SERPL-MCNC: 172 MG/DL (ref 120–199)
CHOLEST/HDLC SERPL: 3.9 {RATIO} (ref 2–5)
CO2 SERPL-SCNC: 23 MMOL/L (ref 23–29)
CREAT SERPL-MCNC: 0.9 MG/DL (ref 0.5–1.4)
EST. GFR  (AFRICAN AMERICAN): >60 ML/MIN/1.73 M^2
EST. GFR  (NON AFRICAN AMERICAN): >60 ML/MIN/1.73 M^2
GLUCOSE SERPL-MCNC: 96 MG/DL (ref 70–110)
HDLC SERPL-MCNC: 44 MG/DL (ref 40–75)
HDLC SERPL: 25.6 % (ref 20–50)
LDLC SERPL CALC-MCNC: 113.8 MG/DL (ref 63–159)
NONHDLC SERPL-MCNC: 128 MG/DL
POTASSIUM SERPL-SCNC: 4.7 MMOL/L (ref 3.5–5.1)
SODIUM SERPL-SCNC: 139 MMOL/L (ref 136–145)
TRIGL SERPL-MCNC: 71 MG/DL (ref 30–150)

## 2021-05-28 PROCEDURE — 80048 BASIC METABOLIC PNL TOTAL CA: CPT | Mod: HCNC | Performed by: INTERNAL MEDICINE

## 2021-05-28 PROCEDURE — 36415 COLL VENOUS BLD VENIPUNCTURE: CPT | Mod: HCNC,PO | Performed by: INTERNAL MEDICINE

## 2021-05-28 PROCEDURE — 80061 LIPID PANEL: CPT | Mod: HCNC | Performed by: INTERNAL MEDICINE

## 2021-06-07 ENCOUNTER — OFFICE VISIT (OUTPATIENT)
Dept: INTERNAL MEDICINE | Facility: CLINIC | Age: 71
End: 2021-06-07
Payer: MEDICARE

## 2021-06-07 VITALS
HEIGHT: 72 IN | SYSTOLIC BLOOD PRESSURE: 122 MMHG | DIASTOLIC BLOOD PRESSURE: 66 MMHG | HEART RATE: 66 BPM | WEIGHT: 236.31 LBS | BODY MASS INDEX: 32.01 KG/M2 | TEMPERATURE: 98 F | RESPIRATION RATE: 20 BRPM | OXYGEN SATURATION: 98 %

## 2021-06-07 DIAGNOSIS — E78.00 PURE HYPERCHOLESTEROLEMIA: Primary | ICD-10-CM

## 2021-06-07 DIAGNOSIS — Z86.010 HISTORY OF COLON POLYPS: ICD-10-CM

## 2021-06-07 DIAGNOSIS — I10 ESSENTIAL HYPERTENSION: ICD-10-CM

## 2021-06-07 DIAGNOSIS — Z12.5 PROSTATE CANCER SCREENING: ICD-10-CM

## 2021-06-07 PROCEDURE — 99214 PR OFFICE/OUTPT VISIT, EST, LEVL IV, 30-39 MIN: ICD-10-PCS | Mod: HCNC,S$GLB,, | Performed by: INTERNAL MEDICINE

## 2021-06-07 PROCEDURE — 99499 UNLISTED E&M SERVICE: CPT | Mod: S$GLB,,, | Performed by: INTERNAL MEDICINE

## 2021-06-07 PROCEDURE — 99214 OFFICE O/P EST MOD 30 MIN: CPT | Mod: HCNC,S$GLB,, | Performed by: INTERNAL MEDICINE

## 2021-06-07 PROCEDURE — 3008F PR BODY MASS INDEX (BMI) DOCUMENTED: ICD-10-PCS | Mod: HCNC,CPTII,S$GLB, | Performed by: INTERNAL MEDICINE

## 2021-06-07 PROCEDURE — 99999 PR PBB SHADOW E&M-EST. PATIENT-LVL III: CPT | Mod: PBBFAC,HCNC,, | Performed by: INTERNAL MEDICINE

## 2021-06-07 PROCEDURE — 1126F AMNT PAIN NOTED NONE PRSNT: CPT | Mod: HCNC,S$GLB,, | Performed by: INTERNAL MEDICINE

## 2021-06-07 PROCEDURE — 3008F BODY MASS INDEX DOCD: CPT | Mod: HCNC,CPTII,S$GLB, | Performed by: INTERNAL MEDICINE

## 2021-06-07 PROCEDURE — 1159F PR MEDICATION LIST DOCUMENTED IN MEDICAL RECORD: ICD-10-PCS | Mod: HCNC,S$GLB,, | Performed by: INTERNAL MEDICINE

## 2021-06-07 PROCEDURE — 1126F PR PAIN SEVERITY QUANTIFIED, NO PAIN PRESENT: ICD-10-PCS | Mod: HCNC,S$GLB,, | Performed by: INTERNAL MEDICINE

## 2021-06-07 PROCEDURE — 99999 PR PBB SHADOW E&M-EST. PATIENT-LVL III: ICD-10-PCS | Mod: PBBFAC,HCNC,, | Performed by: INTERNAL MEDICINE

## 2021-06-07 PROCEDURE — 1159F MED LIST DOCD IN RCRD: CPT | Mod: HCNC,S$GLB,, | Performed by: INTERNAL MEDICINE

## 2021-06-07 PROCEDURE — 99499 RISK ADDL DX/OHS AUDIT: ICD-10-PCS | Mod: S$GLB,,, | Performed by: INTERNAL MEDICINE

## 2021-12-01 ENCOUNTER — LAB VISIT (OUTPATIENT)
Dept: LAB | Facility: HOSPITAL | Age: 71
End: 2021-12-01
Attending: INTERNAL MEDICINE
Payer: MEDICARE

## 2021-12-01 DIAGNOSIS — I10 ESSENTIAL HYPERTENSION: ICD-10-CM

## 2021-12-01 DIAGNOSIS — Z12.5 PROSTATE CANCER SCREENING: ICD-10-CM

## 2021-12-01 LAB
ALBUMIN SERPL BCP-MCNC: 3.7 G/DL (ref 3.5–5.2)
ALP SERPL-CCNC: 116 U/L (ref 55–135)
ALT SERPL W/O P-5'-P-CCNC: 26 U/L (ref 10–44)
ANION GAP SERPL CALC-SCNC: 10 MMOL/L (ref 8–16)
AST SERPL-CCNC: 22 U/L (ref 10–40)
BASOPHILS # BLD AUTO: 0.08 K/UL (ref 0–0.2)
BASOPHILS NFR BLD: 1.1 % (ref 0–1.9)
BILIRUB SERPL-MCNC: 0.9 MG/DL (ref 0.1–1)
BUN SERPL-MCNC: 16 MG/DL (ref 8–23)
CALCIUM SERPL-MCNC: 9.8 MG/DL (ref 8.7–10.5)
CHLORIDE SERPL-SCNC: 106 MMOL/L (ref 95–110)
CHOLEST SERPL-MCNC: 187 MG/DL (ref 120–199)
CHOLEST/HDLC SERPL: 3.7 {RATIO} (ref 2–5)
CO2 SERPL-SCNC: 26 MMOL/L (ref 23–29)
COMPLEXED PSA SERPL-MCNC: 2.4 NG/ML (ref 0–4)
CREAT SERPL-MCNC: 1.1 MG/DL (ref 0.5–1.4)
DIFFERENTIAL METHOD: ABNORMAL
EOSINOPHIL # BLD AUTO: 0.2 K/UL (ref 0–0.5)
EOSINOPHIL NFR BLD: 2.9 % (ref 0–8)
ERYTHROCYTE [DISTWIDTH] IN BLOOD BY AUTOMATED COUNT: 12.7 % (ref 11.5–14.5)
EST. GFR  (AFRICAN AMERICAN): >60 ML/MIN/1.73 M^2
EST. GFR  (NON AFRICAN AMERICAN): >60 ML/MIN/1.73 M^2
GLUCOSE SERPL-MCNC: 91 MG/DL (ref 70–110)
HCT VFR BLD AUTO: 48.9 % (ref 40–54)
HDLC SERPL-MCNC: 50 MG/DL (ref 40–75)
HDLC SERPL: 26.7 % (ref 20–50)
HGB BLD-MCNC: 15.4 G/DL (ref 14–18)
IMM GRANULOCYTES # BLD AUTO: 0.03 K/UL (ref 0–0.04)
IMM GRANULOCYTES NFR BLD AUTO: 0.4 % (ref 0–0.5)
LDLC SERPL CALC-MCNC: 119.8 MG/DL (ref 63–159)
LYMPHOCYTES # BLD AUTO: 1.6 K/UL (ref 1–4.8)
LYMPHOCYTES NFR BLD: 21.9 % (ref 18–48)
MCH RBC QN AUTO: 28.2 PG (ref 27–31)
MCHC RBC AUTO-ENTMCNC: 31.5 G/DL (ref 32–36)
MCV RBC AUTO: 90 FL (ref 82–98)
MONOCYTES # BLD AUTO: 0.7 K/UL (ref 0.3–1)
MONOCYTES NFR BLD: 9.4 % (ref 4–15)
NEUTROPHILS # BLD AUTO: 4.7 K/UL (ref 1.8–7.7)
NEUTROPHILS NFR BLD: 64.3 % (ref 38–73)
NONHDLC SERPL-MCNC: 137 MG/DL
NRBC BLD-RTO: 0 /100 WBC
PLATELET # BLD AUTO: 257 K/UL (ref 150–450)
PMV BLD AUTO: 10.7 FL (ref 9.2–12.9)
POTASSIUM SERPL-SCNC: 4.8 MMOL/L (ref 3.5–5.1)
PROT SERPL-MCNC: 7.1 G/DL (ref 6–8.4)
RBC # BLD AUTO: 5.46 M/UL (ref 4.6–6.2)
SODIUM SERPL-SCNC: 142 MMOL/L (ref 136–145)
TRIGL SERPL-MCNC: 86 MG/DL (ref 30–150)
TSH SERPL DL<=0.005 MIU/L-ACNC: 1.05 UIU/ML (ref 0.4–4)
WBC # BLD AUTO: 7.31 K/UL (ref 3.9–12.7)

## 2021-12-01 PROCEDURE — 36415 COLL VENOUS BLD VENIPUNCTURE: CPT | Mod: HCNC,PO | Performed by: INTERNAL MEDICINE

## 2021-12-01 PROCEDURE — 84153 ASSAY OF PSA TOTAL: CPT | Mod: HCNC | Performed by: INTERNAL MEDICINE

## 2021-12-01 PROCEDURE — 85025 COMPLETE CBC W/AUTO DIFF WBC: CPT | Mod: HCNC | Performed by: INTERNAL MEDICINE

## 2021-12-01 PROCEDURE — 80053 COMPREHEN METABOLIC PANEL: CPT | Mod: HCNC | Performed by: INTERNAL MEDICINE

## 2021-12-01 PROCEDURE — 80061 LIPID PANEL: CPT | Mod: HCNC | Performed by: INTERNAL MEDICINE

## 2021-12-01 PROCEDURE — 84443 ASSAY THYROID STIM HORMONE: CPT | Mod: HCNC | Performed by: INTERNAL MEDICINE

## 2021-12-08 ENCOUNTER — OFFICE VISIT (OUTPATIENT)
Dept: INTERNAL MEDICINE | Facility: CLINIC | Age: 71
End: 2021-12-08
Payer: MEDICARE

## 2021-12-08 VITALS
SYSTOLIC BLOOD PRESSURE: 136 MMHG | HEART RATE: 71 BPM | DIASTOLIC BLOOD PRESSURE: 80 MMHG | OXYGEN SATURATION: 98 % | HEIGHT: 71 IN | BODY MASS INDEX: 33 KG/M2 | WEIGHT: 235.69 LBS

## 2021-12-08 DIAGNOSIS — E78.00 PURE HYPERCHOLESTEROLEMIA: ICD-10-CM

## 2021-12-08 DIAGNOSIS — M67.911 ROTATOR CUFF DISORDER, RIGHT: ICD-10-CM

## 2021-12-08 DIAGNOSIS — I10 PRIMARY HYPERTENSION: ICD-10-CM

## 2021-12-08 DIAGNOSIS — Z00.00 ROUTINE GENERAL MEDICAL EXAMINATION AT A HEALTH CARE FACILITY: Primary | ICD-10-CM

## 2021-12-08 DIAGNOSIS — Z86.010 HISTORY OF COLON POLYPS: ICD-10-CM

## 2021-12-08 PROCEDURE — 99999 PR PBB SHADOW E&M-EST. PATIENT-LVL III: CPT | Mod: PBBFAC,HCNC,, | Performed by: INTERNAL MEDICINE

## 2021-12-08 PROCEDURE — 99499 RISK ADDL DX/OHS AUDIT: ICD-10-PCS | Mod: HCNC,S$GLB,, | Performed by: INTERNAL MEDICINE

## 2021-12-08 PROCEDURE — 4010F ACE/ARB THERAPY RXD/TAKEN: CPT | Mod: HCNC,CPTII,S$GLB, | Performed by: INTERNAL MEDICINE

## 2021-12-08 PROCEDURE — 99499 UNLISTED E&M SERVICE: CPT | Mod: HCNC,S$GLB,, | Performed by: INTERNAL MEDICINE

## 2021-12-08 PROCEDURE — 99397 PER PM REEVAL EST PAT 65+ YR: CPT | Mod: 25,HCNC,S$GLB, | Performed by: INTERNAL MEDICINE

## 2021-12-08 PROCEDURE — 4010F PR ACE/ARB THEARPY RXD/TAKEN: ICD-10-PCS | Mod: HCNC,CPTII,S$GLB, | Performed by: INTERNAL MEDICINE

## 2021-12-08 PROCEDURE — G0008 FLU VACCINE - QUADRIVALENT - ADJUVANTED: ICD-10-PCS | Mod: HCNC,S$GLB,, | Performed by: INTERNAL MEDICINE

## 2021-12-08 PROCEDURE — G0008 ADMIN INFLUENZA VIRUS VAC: HCPCS | Mod: HCNC,S$GLB,, | Performed by: INTERNAL MEDICINE

## 2021-12-08 PROCEDURE — 99999 PR PBB SHADOW E&M-EST. PATIENT-LVL III: ICD-10-PCS | Mod: PBBFAC,HCNC,, | Performed by: INTERNAL MEDICINE

## 2021-12-08 PROCEDURE — 99397 PR PREVENTIVE VISIT,EST,65 & OVER: ICD-10-PCS | Mod: 25,HCNC,S$GLB, | Performed by: INTERNAL MEDICINE

## 2021-12-08 PROCEDURE — 90694 FLU VACCINE - QUADRIVALENT - ADJUVANTED: ICD-10-PCS | Mod: HCNC,S$GLB,, | Performed by: INTERNAL MEDICINE

## 2021-12-08 PROCEDURE — 90694 VACC AIIV4 NO PRSRV 0.5ML IM: CPT | Mod: HCNC,S$GLB,, | Performed by: INTERNAL MEDICINE

## 2021-12-09 ENCOUNTER — CLINICAL SUPPORT (OUTPATIENT)
Dept: REHABILITATION | Facility: HOSPITAL | Age: 71
End: 2021-12-09
Attending: INTERNAL MEDICINE
Payer: MEDICARE

## 2021-12-09 DIAGNOSIS — R29.898 DECREASED ROM OF NECK: ICD-10-CM

## 2021-12-09 DIAGNOSIS — M25.552 LEFT HIP PAIN: ICD-10-CM

## 2021-12-09 DIAGNOSIS — M25.611 DECREASED ROM OF RIGHT SHOULDER: ICD-10-CM

## 2021-12-09 DIAGNOSIS — M25.511 ACUTE PAIN OF RIGHT SHOULDER: ICD-10-CM

## 2021-12-09 DIAGNOSIS — M67.911 ROTATOR CUFF DISORDER, RIGHT: ICD-10-CM

## 2021-12-09 PROCEDURE — 97110 THERAPEUTIC EXERCISES: CPT | Mod: HCNC

## 2021-12-09 PROCEDURE — 97161 PT EVAL LOW COMPLEX 20 MIN: CPT | Mod: HCNC

## 2021-12-09 PROCEDURE — 97140 MANUAL THERAPY 1/> REGIONS: CPT | Mod: HCNC

## 2021-12-16 ENCOUNTER — CLINICAL SUPPORT (OUTPATIENT)
Dept: REHABILITATION | Facility: HOSPITAL | Age: 71
End: 2021-12-16
Payer: MEDICARE

## 2021-12-16 DIAGNOSIS — M25.511 ACUTE PAIN OF RIGHT SHOULDER: ICD-10-CM

## 2021-12-16 DIAGNOSIS — M25.611 DECREASED ROM OF RIGHT SHOULDER: ICD-10-CM

## 2021-12-16 DIAGNOSIS — R29.898 DECREASED ROM OF NECK: ICD-10-CM

## 2021-12-16 PROCEDURE — 97110 THERAPEUTIC EXERCISES: CPT | Mod: HCNC

## 2021-12-16 PROCEDURE — 97140 MANUAL THERAPY 1/> REGIONS: CPT | Mod: HCNC

## 2021-12-21 ENCOUNTER — CLINICAL SUPPORT (OUTPATIENT)
Dept: REHABILITATION | Facility: HOSPITAL | Age: 71
End: 2021-12-21
Payer: MEDICARE

## 2021-12-21 ENCOUNTER — DOCUMENTATION ONLY (OUTPATIENT)
Dept: REHABILITATION | Facility: HOSPITAL | Age: 71
End: 2021-12-21

## 2021-12-21 DIAGNOSIS — R29.898 DECREASED ROM OF NECK: ICD-10-CM

## 2021-12-21 DIAGNOSIS — M25.611 DECREASED ROM OF RIGHT SHOULDER: ICD-10-CM

## 2021-12-21 DIAGNOSIS — M25.511 ACUTE PAIN OF RIGHT SHOULDER: ICD-10-CM

## 2021-12-21 PROCEDURE — 97110 THERAPEUTIC EXERCISES: CPT | Mod: HCNC,CQ

## 2021-12-21 PROCEDURE — 97140 MANUAL THERAPY 1/> REGIONS: CPT | Mod: HCNC,CQ

## 2021-12-28 ENCOUNTER — CLINICAL SUPPORT (OUTPATIENT)
Dept: REHABILITATION | Facility: HOSPITAL | Age: 71
End: 2021-12-28
Payer: MEDICARE

## 2021-12-28 DIAGNOSIS — M25.511 ACUTE PAIN OF RIGHT SHOULDER: ICD-10-CM

## 2021-12-28 DIAGNOSIS — M25.611 DECREASED ROM OF RIGHT SHOULDER: ICD-10-CM

## 2021-12-28 DIAGNOSIS — R29.898 DECREASED ROM OF NECK: ICD-10-CM

## 2021-12-28 PROCEDURE — 97140 MANUAL THERAPY 1/> REGIONS: CPT | Mod: HCNC

## 2021-12-28 PROCEDURE — 97032 APPL MODALITY 1+ESTIM EA 15: CPT | Mod: HCNC

## 2021-12-28 PROCEDURE — 97110 THERAPEUTIC EXERCISES: CPT | Mod: HCNC

## 2021-12-30 ENCOUNTER — CLINICAL SUPPORT (OUTPATIENT)
Dept: REHABILITATION | Facility: HOSPITAL | Age: 71
End: 2021-12-30
Payer: MEDICARE

## 2021-12-30 DIAGNOSIS — M25.511 ACUTE PAIN OF RIGHT SHOULDER: ICD-10-CM

## 2021-12-30 DIAGNOSIS — R29.898 DECREASED ROM OF NECK: ICD-10-CM

## 2021-12-30 DIAGNOSIS — M25.611 DECREASED ROM OF RIGHT SHOULDER: ICD-10-CM

## 2021-12-30 PROCEDURE — 97110 THERAPEUTIC EXERCISES: CPT | Mod: HCNC

## 2022-01-04 ENCOUNTER — DOCUMENTATION ONLY (OUTPATIENT)
Dept: REHABILITATION | Facility: HOSPITAL | Age: 72
End: 2022-01-04

## 2022-01-04 ENCOUNTER — CLINICAL SUPPORT (OUTPATIENT)
Dept: REHABILITATION | Facility: HOSPITAL | Age: 72
End: 2022-01-04
Payer: MEDICARE

## 2022-01-04 DIAGNOSIS — R29.898 DECREASED ROM OF NECK: ICD-10-CM

## 2022-01-04 DIAGNOSIS — M25.611 DECREASED ROM OF RIGHT SHOULDER: ICD-10-CM

## 2022-01-04 DIAGNOSIS — M25.511 ACUTE PAIN OF RIGHT SHOULDER: ICD-10-CM

## 2022-01-04 PROCEDURE — 97110 THERAPEUTIC EXERCISES: CPT | Mod: HCNC,CQ

## 2022-01-04 PROCEDURE — 97140 MANUAL THERAPY 1/> REGIONS: CPT | Mod: HCNC,CQ

## 2022-01-04 NOTE — PROGRESS NOTES
PT/PTA met face to face to discuss pt's treatment plan and progress towards established goals. Pt will be seen by a physical therapist minimally every 6th visit or every 30 days.        Eun Myrick PTA

## 2022-01-04 NOTE — PROGRESS NOTES
Physical Therapist Assistant Daily Treatment Note     Name: Geremias Quijano  Clinic Number: [id number]    Therapy Diagnosis:   Encounter Diagnoses   Name Primary?    Decreased ROM of right shoulder     Acute pain of right shoulder     Decreased ROM of neck      Physician: Martínez White MD    Visit Date: 1/4/2022   Physician Orders: PT to eval and treat  Medical Diagnosis from Referral: M67.911 (ICD-10-CM) - Rotator cuff disorder, right  Evaluation Date: 12/9/2021  Authorization Period Expiration: 6/16/22  Plan of Care Expiration: 2/17/22  Visit # / Visits authorized: 1/20;    4/20  Progress note due: 10th visit or 30 days from eval    Foto: 2/3     PTA VISIT 1/5     Precautions: Standard, HTN, mild Loss of hearing     Time In:7:45   Time Out:8:51  Total Time:  60 minutes    SUBJECTIVE     Today, pt reports: feeling a bit sore; pain with certain movements  He was compliant with home exercise program.  Response to previous treatment: able to work out a bit at the gym  Functional change: no changes at this time    Pre-Treatment Pain: 2/10 at rest; certain motions 7/10 at times  Post-Treatment Pain:  2/10  Location: anterior R shoulder      TREATMENT     Geremias received therapeutic exercises to develop strength, endurance, ROM, flexibility, posture and core stabilization for 47 minutes including:  Side lying R shoulder abduction to 90 3x10  Side lying R shoulder external rotation 2# 3x 10  T spine extension on foam roller placed at 3 levels  Wind shield wipers for hip stretch  Zak curl 15# KB with 3 deep breaths at the bottom 1x 10  Horizontal abduction isometrics bilaterally looped RTB 2x15  Supine bilateral Y's w/crossed GTB 2x10  Half kneeling single arm press ups- 2 way blue looped resistive band 2x10 ea way  Crossed llower A pulley's 20 # per arm 2x 10   Crossed W 10 # per arm 2x 10  Quadruped cat/camel x10  Quadruped thread the needle 3x5 ea      Deferred:  Open book 2x 10 per side then 1x 10 with PT  overpressure  Supine figure 4 hip stretch B  Supine piriformis stretch B  Hand to heel rocks   Standing roll downs  Face pulls 20 # pulley per arm  Protraction/retraction with blue band in door way 2x 10 (in lat stretch position)   MedX trunk rotation w/ no weight to work on range of motion  UBE standing back wards for 5 min   Sit to stand with 15# KB on R to pull in RTC stabilizers 2x 8      Geremias received the following manual therapy techniques x 12 min to include:  STM of the anterior/posterior cuff muscles/pects  TOMY gerard    Geremias participated in neuromuscular re-education activities to improve: Balance, Coordination, Proprioception and Posture for - minutes. The following activities were included:    Geremias received the following direct contact modalities after being cleared for contraindications:     Geremias received the following supervised modalities after being cleared for contradictions:     Geremias received hot pack for - minutes to -.    Geremias received cold pack for - minutes to -.  Geremias received electrical stimulation        Home Exercises Provided and Patient Education Provided     Education/Self-Care provided: (during therex)   Patient educated on the importance of improved core and upper and lower extremity strength in order to improve alignment of the spine and upper and lower extremities with static positions and dynamic movement.    Patient educated on the importance of strong core and lower extremity musculature in order to improve both static and dynamic balance, improve gait mechanics, reduce fall risk and improve household and community mobility.       Written Home Exercises Provided: Patient instructed to cont prior HEP.  Exercises were reviewed and Geremias was able to demonstrate them prior to the end of the session.  Geremias demonstrated good  understanding of the education provided.     See EMR under Patient Instructions for exercises provided prior visit.    ASSESSMENT   Geremias presented to therapy today  with very mild pain mainly in the anterior shoulder/pectoral region. He continued with his mobility and strengthening activities w/cueing required intermittently to decrease upper trap compensation. With cueing he tolerated his side lying abduction exercises well and without pain. Addition of few new stabilizing activities today. Geremias demonstrated a positive response to today's session as evidenced with his activity progression/tolerance and no increased pain.   Geremias Is progressing well towards his goals.   Pt prognosis is Good.     Pt will continue to benefit from skilled outpatient physical therapy to address the deficits listed in the problem list box on initial evaluation, provide pt/family education and to maximize pt's level of independence in the home and community environment.     Pt's spiritual, cultural and educational needs considered and pt agreeable to plan of care and goals.     Anticipated barriers to physical therapy: pt has a busy work schedule, multiple joints involved    Goals:   Short Term Goals: In 4 weeks   1.Pt to be educated on HEP. progressing  2.Patient to increase GH ROM to 165 or better. progerssing  3.Patient to have no pain with shoulder internal rotation at 90 degrees. progressing  4.Patient to have pain less than 4/10 with lifting at the gym. progressing  5.Patient to improve score on the FOTO by 10%.  6. Pt to be educated on postural and body mechanics awareness.     Long Term Goals: In 10 weeks 2/17/22  1. Patient to improve score on the FOTO to 27% limitation or better.  2. Patient to demo increase in UE strength to 4/5 with the lower traps  3. Patient to demonstrate increased cervical extension to 60 degrees or better  4. Patient to demo shoulder abduction up to 3# without pain  5. Patient to perform daily activities including single arm lifting overhead up to 10 # or more without limitation to simulate some home maintenance tasks.       PLAN   Continue Plan of Care (POC) and  progress per patient tolerance.    Eun Myrick, PTA

## 2022-01-06 ENCOUNTER — CLINICAL SUPPORT (OUTPATIENT)
Dept: REHABILITATION | Facility: HOSPITAL | Age: 72
End: 2022-01-06
Payer: MEDICARE

## 2022-01-06 DIAGNOSIS — R29.898 DECREASED ROM OF NECK: ICD-10-CM

## 2022-01-06 DIAGNOSIS — M25.611 DECREASED ROM OF RIGHT SHOULDER: ICD-10-CM

## 2022-01-06 DIAGNOSIS — M25.511 ACUTE PAIN OF RIGHT SHOULDER: ICD-10-CM

## 2022-01-06 PROCEDURE — 97110 THERAPEUTIC EXERCISES: CPT | Mod: HCNC,CQ

## 2022-01-06 PROCEDURE — 97140 MANUAL THERAPY 1/> REGIONS: CPT | Mod: HCNC,CQ

## 2022-01-06 NOTE — PROGRESS NOTES
Physical Therapist Assistant Daily Treatment Note     Name: Geremias Quijano  Clinic Number: [id number]    Therapy Diagnosis:   Encounter Diagnoses   Name Primary?    Decreased ROM of right shoulder     Acute pain of right shoulder     Decreased ROM of neck      Physician: Martínez White MD    Visit Date: 1/6/2022   Physician Orders: PT to eval and treat  Medical Diagnosis from Referral: M67.911 (ICD-10-CM) - Rotator cuff disorder, right  Evaluation Date: 12/9/2021  Authorization Period Expiration: 6/16/22  Plan of Care Expiration: 2/17/22  Visit # / Visits authorized: 2/20;    4/20  Progress note due: 10th visit or 30 days from eval    Foto: 2/3     PTA VISIT 2/5     Precautions: Standard, HTN, mild Loss of hearing     Time In: 8:00  Time Out: 9:15  Total Time:  70 minutes    SUBJECTIVE     Today, pt reports: feeling a bit sore; pain with certain movements  He was compliant with home exercise program.  Response to previous treatment: able to work out a bit at the gym  Functional change: no changes at this time    Pre-Treatment Pain: 2/10 at rest; certain motions 7/10 at times  Post-Treatment Pain:  0-1/10  Location: anterior R shoulder/pectoral region      TREATMENT     Geremias received therapeutic exercises to develop strength, endurance, ROM, flexibility, posture and core stabilization for 53 minutes including:    Standing UBE for mobility BW 5 min  Side lying R shoulder abduction to 90 3x10  Side lying R shoulder external rotation 2# 3x 10  T spine extension on foam roller placed at 3 levels  1/2 roll series:   ---postural/pect stretch supine 2 min  ---alternating arms x20  ---horizontal abduction x10  ---UE snow bettie slides x10  Wind shield wipers for hip stretch x10  Zak curl 15# KB with 3 deep breaths at the bottom 1x 10  Horizontal abduction isometrics (pulses) bilaterally looped RTB 2x15  Supine bilateral Y's w/crossed RTB 2x10  Matrix trunk rotation without weight initially for range of motion then  added 20# 2x10 ea side  Half kneeling single arm press ups from goal post position w/ blue looped resistive band 2x10   Crossed lower A pulley's 20 # per arm 2x 10   Quadruped cat/camel x10  Quadruped thread the needle 2x5 ea    Deferred:  Open book 2x 10 per side then 1x 10 with PT overpressure  Supine figure 4 hip stretch B  Supine piriformis stretch B  Hand to heel rocks   Standing roll downs  Face pulls 20 # pulley per arm  Protraction/retraction with blue band in door way 2x 10 (in lat stretch position)   Sit to stand with 15# KB on R to pull in RTC stabilizers 2x 8  Crossed W 10 # per arm 2x 10    Geremias received the following manual therapy techniques x 17 min to include:  STM of the anterior/posterior cuff muscles/pects/cervical paraspinals/UT  GHJ youngs    Geremias participated in neuromuscular re-education activities to improve: Balance, Coordination, Proprioception and Posture for - minutes. The following activities were included:    Geremias received the following direct contact modalities after being cleared for contraindications:     Geremias received the following supervised modalities after being cleared for contradictions:     Geremias received hot pack for - minutes to -.    Geremias received cold pack for - minutes to -.  Geremias received electrical stimulation        Home Exercises Provided and Patient Education Provided     Education/Self-Care provided: (during therex)   Patient educated on the importance of improved core and upper and lower extremity strength in order to improve alignment of the spine and upper and lower extremities with static positions and dynamic movement.    Patient educated on the importance of strong core and lower extremity musculature in order to improve both static and dynamic balance, improve gait mechanics, reduce fall risk and improve household and community mobility.       Written Home Exercises Provided: Patient instructed to cont prior HEP.  Exercises were reviewed and Geremias was able to  demonstrate them prior to the end of the session.  Geremias demonstrated good  understanding of the education provided.     See EMR under Patient Instructions for exercises provided prior visit.    ASSESSMENT   Geremias presented to therapy today with very mild pain mainly in the anterior shoulder/pectoral region. He continued with his mobility and strengthening activities w/cueing required intermittently to decrease upper trap compensation. Added postural series on the 1/2 roll today to open his upper quadrant with UE mobility activities. His range improved today with flexion/abduction. His spinal mobility continues to be restricted impacting his shoulder alignment and stability.   Geremias demonstrated a positive response to today's session as evidenced with his activity progression/tolerance and decreased pain.   Geremias Is progressing well towards his goals.   Pt prognosis is Good.     Pt will continue to benefit from skilled outpatient physical therapy to address the deficits listed in the problem list box on initial evaluation, provide pt/family education and to maximize pt's level of independence in the home and community environment.     Pt's spiritual, cultural and educational needs considered and pt agreeable to plan of care and goals.     Anticipated barriers to physical therapy: pt has a busy work schedule, multiple joints involved    Goals:   Short Term Goals: In 4 weeks   1.Pt to be educated on HEP. progressing  2.Patient to increase GH ROM to 165 or better. progerssing  3.Patient to have no pain with shoulder internal rotation at 90 degrees. progressing  4.Patient to have pain less than 4/10 with lifting at the gym. progressing  5.Patient to improve score on the FOTO by 10%.  6. Pt to be educated on postural and body mechanics awareness.     Long Term Goals: In 10 weeks 2/17/22  1. Patient to improve score on the FOTO to 27% limitation or better.  2. Patient to demo increase in UE strength to 4/5 with the lower  traps  3. Patient to demonstrate increased cervical extension to 60 degrees or better  4. Patient to demo shoulder abduction up to 3# without pain  5. Patient to perform daily activities including single arm lifting overhead up to 10 # or more without limitation to simulate some home maintenance tasks.       PLAN   Continue Plan of Care (POC) and progress per patient tolerance.    Eun Myrick, PTA

## 2022-01-11 ENCOUNTER — CLINICAL SUPPORT (OUTPATIENT)
Dept: REHABILITATION | Facility: HOSPITAL | Age: 72
End: 2022-01-11
Payer: MEDICARE

## 2022-01-11 DIAGNOSIS — M25.611 DECREASED ROM OF RIGHT SHOULDER: ICD-10-CM

## 2022-01-11 DIAGNOSIS — M25.511 ACUTE PAIN OF RIGHT SHOULDER: ICD-10-CM

## 2022-01-11 DIAGNOSIS — R29.898 DECREASED ROM OF NECK: ICD-10-CM

## 2022-01-11 PROCEDURE — 97110 THERAPEUTIC EXERCISES: CPT | Mod: HCNC,CQ

## 2022-01-11 PROCEDURE — 97140 MANUAL THERAPY 1/> REGIONS: CPT | Mod: HCNC,CQ

## 2022-01-11 NOTE — PROGRESS NOTES
"  Physical Therapist Assistant Daily Treatment Note     Name: Geremias Quijano  Clinic Number: [id number]    Therapy Diagnosis:   Encounter Diagnoses   Name Primary?    Decreased ROM of right shoulder     Acute pain of right shoulder     Decreased ROM of neck      Physician: Martínez White MD    Visit Date: 1/11/2022   Physician Orders: PT to eval and treat  Medical Diagnosis from Referral: M67.911 (ICD-10-CM) - Rotator cuff disorder, right  Evaluation Date: 12/9/2021  Authorization Period Expiration: 6/16/22  Plan of Care Expiration: 2/17/22  Visit # / Visits authorized: 3/20;    5/20  Progress note due: 10th visit or 30 days from eval    Foto: 2/3     PTA VISIT 3/5     Precautions: Standard, HTN, mild Loss of hearing     Time In: 7:42  Time Out: 8:46  Total Time: 60  minutes    SUBJECTIVE     Today, pt reports: "I actually felt good for a few days after my last session"  He was compliant with home exercise program.  Response to previous treatment: longer period of pain relief  Functional change: no changes at this time    Pre-Treatment Pain: 0/10 at res  Post-Treatment Pain:  0-1/10  Location: anterior R shoulder/pectoral region      TREATMENT     Geremias received therapeutic exercises to develop strength, endurance, ROM, flexibility, posture and core stabilization for 48 minutes including:    Standing UBE for mobility BW 5 min  Side lying R shoulder abduction to 90 3x10  Side lying R shoulder external rotation 2# 3x 10  T spine extension on foam roller place at 3 levels  1/2 roll series:   ---postural/pect stretch supine 2 min  ---alternating arms x20  ---horizontal abduction RTB x10  ---UE bettie slides x10  ---diagonals x10  Wind shield wipers for hip stretch x 2min  Zak curl 15# KB with 3 deep breaths at the bottom 1x 10  Horizontal abduction isometrics (pulses) bilaterally looped RTB 2x15  Supine bilateral Y's w/crossed YTB 2x10 --increase to RTB next  Quadruped cat/camel x10  Quadruped thread the needle " 2x5 ea  Matrix trunk rotation without weight initially for range of motion then added 20# 2x10 ea side  Single arm press ups from goal post position w/ blue looped resistive band 2x10 ea arm  Crossed lower A pulley's 20 # per arm x10; 30# x10       Deferred:  Open book 2x 10 per side then 1x 10 with PT overpressure  Supine figure 4 hip stretch B  Supine piriformis stretch B  Hand to heel rocks   Standing roll downs  Face pulls 20 # pulley per arm  Protraction/retraction with blue band in door way 2x 10 (in lat stretch position)   Sit to stand with 15# KB on R to pull in RTC stabilizers 2x 8  Crossed W 10 # per arm 2x 10    Geremias received the following manual therapy techniques x 12 min to include:  STM of the anterior/posterior cuff muscles/pects/cervical paraspinals/UT  GHJ youngs    Geremias participated in neuromuscular re-education activities to improve: Balance, Coordination, Proprioception and Posture for - minutes. The following activities were included:    Geremias received the following direct contact modalities after being cleared for contraindications:     Geremias received the following supervised modalities after being cleared for contradictions:     Geremias received hot pack for - minutes to -.    Geremias received cold pack for - minutes to -.  Geremias received electrical stimulation        Home Exercises Provided and Patient Education Provided     Education/Self-Care provided: (during therex)   Patient educated on the importance of improved core and upper and lower extremity strength in order to improve alignment of the spine and upper and lower extremities with static positions and dynamic movement.    Patient educated on the importance of strong core and lower extremity musculature in order to improve both static and dynamic balance, improve gait mechanics, reduce fall risk and improve household and community mobility.       Written Home Exercises Provided: Patient instructed to cont prior HEP.  Exercises were reviewed  and Geremias was able to demonstrate them prior to the end of the session.  Geremias demonstrated good  understanding of the education provided.     See EMR under Patient Instructions for exercises provided prior visit.    ASSESSMENT   Geremias presented to therapy today without pain reporting he had pain relief for a few days after last session.  He continued with his mobility and strengthening activities w/cueing required intermittently to decrease upper trap compensation and to work on eccentric control with his exercises. Noted improved GHJ mobility posterior/inferior; less anteriorly.  His spinal mobility continues to be restricted impacting his shoulder alignment and stability. Geremias demonstrated a positive response to today's session as evidenced with his activity progression/tolerance without complaints of pain.   Geremias Is progressing well towards his goals.   Pt prognosis is Good.     Pt will continue to benefit from skilled outpatient physical therapy to address the deficits listed in the problem list box on initial evaluation, provide pt/family education and to maximize pt's level of independence in the home and community environment.     Pt's spiritual, cultural and educational needs considered and pt agreeable to plan of care and goals.     Anticipated barriers to physical therapy: pt has a busy work schedule, multiple joints involved    Goals:   Short Term Goals: In 4 weeks   1.Pt to be educated on HEP. progressing  2.Patient to increase GH ROM to 165 or better. progerssing  3.Patient to have no pain with shoulder internal rotation at 90 degrees. progressing  4.Patient to have pain less than 4/10 with lifting at the gym. progressing  5.Patient to improve score on the FOTO by 10%.  6. Pt to be educated on postural and body mechanics awareness.     Long Term Goals: In 10 weeks 2/17/22  1. Patient to improve score on the FOTO to 27% limitation or better.  2. Patient to demo increase in UE strength to 4/5 with the lower  traps  3. Patient to demonstrate increased cervical extension to 60 degrees or better  4. Patient to demo shoulder abduction up to 3# without pain  5. Patient to perform daily activities including single arm lifting overhead up to 10 # or more without limitation to simulate some home maintenance tasks.       PLAN   Continue Plan of Care (POC) and progress per patient tolerance.    Eun Myrick, PTA

## 2022-01-13 ENCOUNTER — CLINICAL SUPPORT (OUTPATIENT)
Dept: REHABILITATION | Facility: HOSPITAL | Age: 72
End: 2022-01-13
Payer: MEDICARE

## 2022-01-13 DIAGNOSIS — R29.898 DECREASED ROM OF NECK: ICD-10-CM

## 2022-01-13 DIAGNOSIS — M25.511 ACUTE PAIN OF RIGHT SHOULDER: ICD-10-CM

## 2022-01-13 DIAGNOSIS — M25.611 DECREASED ROM OF RIGHT SHOULDER: ICD-10-CM

## 2022-01-13 PROCEDURE — 97140 MANUAL THERAPY 1/> REGIONS: CPT | Mod: HCNC

## 2022-01-13 PROCEDURE — 97110 THERAPEUTIC EXERCISES: CPT | Mod: HCNC

## 2022-01-13 NOTE — PROGRESS NOTES
Physical Therapist Daily Treatment Note and Progress Note     Name: Geremias Quijano  Clinic Number: [id number]    Therapy Diagnosis:   Encounter Diagnoses   Name Primary?    Decreased ROM of right shoulder     Acute pain of right shoulder     Decreased ROM of neck      Physician: Martínez White MD    Visit Date: 1/13/2022   Physician Orders: PT to eval and treat  Medical Diagnosis from Referral: M67.911 (ICD-10-CM) - Rotator cuff disorder, right  Evaluation Date: 12/9/2021  Authorization Period Expiration: 6/16/22  Plan of Care Expiration: 2/17/22  Visit # / Visits authorized: 4/20;    (6 in 2021)  Progress note due: 10th visit or 30 days from eval    Foto: 2/3     PTA VISIT 0/5     Precautions: Standard, HTN, mild Loss of hearing     Time In: 8:30 am  Time Out: 9:25 am  Total Time: 55 minutes    SUBJECTIVE     Today, pt reports: doing a bit better but abduction is still painful  He was compliant with home exercise program.  Response to previous treatment: longer period of pain relief  Functional change: no changes at this time    Pre-Treatment Pain: 0/10 at rest  Post-Treatment Pain:  0-1/10  Location: anterior R shoulder/pectoral region      TREATMENT   Multiple trigger points in the R UT, infraspinatus, lat, serratus, subscapularis, bicep, deltoid and tricep. PT noted he had a tight inferior posterior and anterior capsule on the R shoulder.     Shoulder ROM:   at eval; today  Active/Passive Joint Range Right   Flexion 150 to 155 p!;same   ABDuction 140 to 148   External Rotation 60; 80 at 90 abduction and 70 at 0 abduction   Internal Rotation 50 p!;pain free at 75 degrees unless shoulder abducted at 90 then painful at 30 degrees   Adduction 15 p!; pain free at 28 degrees   Extension 35      Pain with motions of flexion and shoulder abduction/internal rotation but this resolves when he doesn't overfire the UT at least allowing for 20 degrees more of abduction        T spine rotation 10% L 30 % R seated;  improved to 50     Geremias received therapeutic exercises to develop strength, endurance, ROM, flexibility, posture and core stabilization for 30 minutes including:    Standing UBE for mobility BW 5 min  Lat stretch with purple band 3x 30 sec R and L x 2 sets  Posterior shoulder stretch with purple band 3 x30 sec R and L 2 sets each  Shoulder ER at 90/90 green theraband with R shoulder pulled in humeral posturing into the socket using a purple band 3x 10  Scapular protraction (passive with purple band) retraction AROM 3x 10 per arm    Deferred:  Side lying R shoulder abduction to 90 3x10  Side lying R shoulder external rotation 2# 3x 10  T spine extension on foam roller place at 3 levels  1/2 roll series:   ---postural/pect stretch supine 2 min  ---alternating arms x20  ---horizontal abduction RTB x10  ---UE bettie slides x10  ---diagonals x10  Wind shield wipers for hip stretch x 2min  Zak curl 15# KB with 3 deep breaths at the bottom 1x 10  Horizontal abduction isometrics (pulses) bilaterally looped RTB 2x15  Supine bilateral Y's w/crossed YTB 2x10 --increase to RTB next  Quadruped cat/camel x10  Quadruped thread the needle 2x5 ea  Matrix trunk rotation without weight initially for range of motion then added 20# 2x10 ea side  Single arm press ups from goal post position w/ blue looped resistive band 2x10 ea arm  Crossed lower A pulley's 20 # per arm x10; 30# x10   Open book 2x 10 per side then 1x 10 with PT overpressure  Supine figure 4 hip stretch B  Supine piriformis stretch B  Hand to heel rocks   Standing roll downs  Face pulls 20 # pulley per arm  Protraction/retraction with blue band in door way 2x 10 (in lat stretch position)   Sit to stand with 15# KB on R to pull in RTC stabilizers 2x 8  Crossed W 10 # per arm 2x 10    Geremias received the following manual therapy techniques x 23 min to include:  PT used manual therapy to determine DN points for treating bicep, lateral and anterior deltoid, pect minor,  subscapularis and UT on the R  PT had pt work on therex after then followed with:  Scapular mobs superior to inferior, soft tissue mobs to the Upper traps, infraspinatus, subscapularis, supraspinatus, lats and serratus to help free up the scapula      Geremias participated in neuromuscular re-education activities to improve: Balance, Coordination, Proprioception and Posture for - minutes. The following activities were included:    Geremias received the following direct contact modalities after being cleared for contraindications:     Geremias received the following supervised modalities after being cleared for contradictions:     Geremias received hot pack for - minutes to -.    Geremias received cold pack for - minutes to -.  Geremias received electrical stimulation        Home Exercises Provided and Patient Education Provided     Education/Self-Care provided: (during therex)   Patient educated on the importance of improved core and upper and lower extremity strength in order to improve alignment of the spine and upper and lower extremities with static positions and dynamic movement.    Patient educated on the importance of strong core and lower extremity musculature in order to improve both static and dynamic balance, improve gait mechanics, reduce fall risk and improve household and community mobility.       Written Home Exercises Provided: Patient instructed to cont prior HEP.  Exercises were reviewed and Geremias was able to demonstrate them prior to the end of the session.  Geremias demonstrated good  understanding of the education provided.     See EMR under Patient Instructions for exercises provided prior visit.    ASSESSMENT   Geremias presented to therapy today with pt showing increased ROM but he has multiple trigger points and tight inferior capsule. Pt has been making gains in motion in the neck and spine and the scapula is beginning to move better but still needs considerable assistance to progress and free up to reduce his impinging  postures. PT worked on more soft tissue and mobilization of tissues to help him advance but he will still need considerable gains to reduce scalene and UT tension and activate the posterior chain to reduce his tendency to anterior tip at the scapula with activities at 90 degrees and above. Geremias demonstrated a positive response to today's session as evidenced with his activity progression/tolerance without complaints of pain.   Geremias Is progressing well towards his goals.   Pt prognosis is Good.     Pt will continue to benefit from skilled outpatient physical therapy to address the deficits listed in the problem list box on initial evaluation, provide pt/family education and to maximize pt's level of independence in the home and community environment.     Pt's spiritual, cultural and educational needs considered and pt agreeable to plan of care and goals.     Anticipated barriers to physical therapy: pt has a busy work schedule, multiple joints involved    Goals:   Short Term Goals: In 4 weeks   1.Pt to be educated on HEP. progressing  2.Patient to increase GH ROM to 165 or better. progerssing  3.Patient to have no pain with shoulder internal rotation at 90 degrees. progressing  4.Patient to have pain less than 4/10 with lifting at the gym. progressing  5.Patient to improve score on the FOTO by 10%.  6. Pt to be educated on postural and body mechanics awareness.     Long Term Goals: In 10 weeks 2/17/22  1. Patient to improve score on the FOTO to 27% limitation or better.  2. Patient to demo increase in UE strength to 4/5 with the lower traps  3. Patient to demonstrate increased cervical extension to 60 degrees or better  4. Patient to demo shoulder abduction up to 3# without pain  5. Patient to perform daily activities including single arm lifting overhead up to 10 # or more without limitation to simulate some home maintenance tasks.       PLAN   Continue Plan of Care (POC) and progress per patient  tolerance.    Azeb Lancaster, PT

## 2022-01-18 ENCOUNTER — CLINICAL SUPPORT (OUTPATIENT)
Dept: REHABILITATION | Facility: HOSPITAL | Age: 72
End: 2022-01-18
Payer: MEDICARE

## 2022-01-18 DIAGNOSIS — M25.511 ACUTE PAIN OF RIGHT SHOULDER: ICD-10-CM

## 2022-01-18 DIAGNOSIS — R29.898 DECREASED ROM OF NECK: ICD-10-CM

## 2022-01-18 DIAGNOSIS — M25.611 DECREASED ROM OF RIGHT SHOULDER: ICD-10-CM

## 2022-01-18 PROCEDURE — 97140 MANUAL THERAPY 1/> REGIONS: CPT | Mod: KX,HCNC,CQ

## 2022-01-18 PROCEDURE — 97110 THERAPEUTIC EXERCISES: CPT | Mod: KX,HCNC,CQ

## 2022-01-18 NOTE — PROGRESS NOTES
Physical Therapist Assistant Daily Treatment Note      Name: Geremias Quijano  Clinic Number: [id number]    Therapy Diagnosis:   Encounter Diagnoses   Name Primary?    Decreased ROM of right shoulder     Acute pain of right shoulder     Decreased ROM of neck      Physician: Martínez White MD    Visit Date: 1/18/2022   Physician Orders: PT to eval and treat  Medical Diagnosis from Referral: M67.911 (ICD-10-CM) - Rotator cuff disorder, right  Evaluation Date: 12/9/2021  Authorization Period Expiration: 6/16/22  Plan of Care Expiration: 2/17/22  Visit # / Visits authorized: 5/20;    (6 in 2021)  Progress note due: 10th visit or 30 days from eval    Foto: 2/3     PTA VISIT 1/5     Precautions: Standard, HTN, mild Loss of hearing     Time In: 7:43  Time Out: 8:47  Total Time: 55 minutes    SUBJECTIVE     Today, pt reports: still hurts w/abduction movements  He was compliant with home exercise program.  Response to previous treatment: no issues  Functional change: no changes at this time    Pre-Treatment Pain: 0/10 at rest  Post-Treatment Pain:  0-1/10  Location: anterior R shoulder/pectoral region      TREATMENT   Shoulder ROM:   at eval; 1/13/2022  Active/Passive Joint Range Right   Flexion 150 to 155 p!;same   ABDuction 140 to 148   External Rotation 60; 80 at 90 abduction and 70 at 0 abduction   Internal Rotation 50 p!;pain free at 75 degrees unless shoulder abducted at 90 then painful at 30 degrees   Adduction 15 p!; pain free at 28 degrees   Extension 35   1/13/2022:  Pain with motions of flexion and shoulder abduction/internal rotation but this resolves when he doesn't overfire the UT at least allowing for 20 degrees more of abduction  T spine rotation 10% L 30 % R seated; improved to 50    -------     Geremias received therapeutic exercises to develop strength, endurance, ROM, flexibility, posture and core stabilization for 40 minutes including:    Standing UBE for mobility BW 5 min  Supine chin tuck w/lift (head  "just hovering off mat staying neutral) 5" x6  Quadruped chin tuck / shoulders protracted with cervical rotation x10  Standing mermaid stretch on R x5 ; add in rotation x5  Standing Lat stretch 2x10  Shoulder ER at 90/90 green theraband with R shoulder pulled in humeral posturing into the socket using a purple band 3x 10  Scapular protraction/retraction 3# 2x10 per arm  Prone chin tuck with w into y  3x5  90/90 ER w/lateral walk out against GTB hold 10" x5  T spine extension on foam roller place at 3 levels  Cervical rotation w/towel assist x8 ea side  Standing YTI w/ YTB 2x10      Deferred:  Side lying R shoulder abduction to 90 3x10  Side lying R shoulder external rotation 2# 3x 10  1/2 roll series:   ---postural/pect stretch supine 2 min  ---alternating arms x20  ---horizontal abduction RTB x10  ---UE bettie slides x10  ---diagonals x10  Wind shield wipers for hip stretch x 2min  Zak curl 15# KB with 3 deep breaths at the bottom 1x 10  Horizontal abduction isometrics (pulses) bilaterally looped RTB 2x15  Supine bilateral Y's w/crossed YTB 2x10 --increase to RTB next  Quadruped cat/camel x10  Quadruped thread the needle 2x5 ea  Matrix trunk rotation without weight initially for range of motion then added 20# 2x10 ea side  Single arm press ups from goal post position w/ blue looped resistive band 2x10 ea arm  Crossed lower A pulley's 20 # per arm x10; 30# x10   Open book 2x 10 per side then 1x 10 with PT overpressure  Supine figure 4 hip stretch B  Supine piriformis stretch B  Hand to heel rocks   Standing roll downs  Face pulls 20 # pulley per arm  Protraction/retraction with blue band in door way 2x 10 (in lat stretch position)   Sit to stand with 15# KB on R to pull in RTC stabilizers 2x 8  Crossed W 10 # per arm 2x 10    Geremias received the following manual therapy techniques x 15 min to include:  GHJ mobs  Scapular mobs superior to inferior  Soft tissue mobs to the Upper traps, infraspinatus, subscapularis, " supraspinatus, lats and serratus to help free up the scapula      Geremias participated in neuromuscular re-education activities to improve: Balance, Coordination, Proprioception and Posture for - minutes. The following activities were included:    Geremias received the following direct contact modalities after being cleared for contraindications:     Geremias received the following supervised modalities after being cleared for contradictions:     Geremias received hot pack for - minutes to -.    Geremias received cold pack for - minutes to -.  Geremias received electrical stimulation        Home Exercises Provided and Patient Education Provided     Education/Self-Care provided: (during therex)   Patient educated on the importance of improved core and upper and lower extremity strength in order to improve alignment of the spine and upper and lower extremities with static positions and dynamic movement.    Patient educated on the importance of strong core and lower extremity musculature in order to improve both static and dynamic balance, improve gait mechanics, reduce fall risk and improve household and community mobility.       Written Home Exercises Provided: Patient instructed to cont prior HEP.  Exercises were reviewed and Geremias was able to demonstrate them prior to the end of the session.  Geremias demonstrated good  understanding of the education provided.     See EMR under Patient Instructions for exercises provided prior visit.    ASSESSMENT   Geremias presented to therapy today with increased ROM but continues to have a tight inferior shoulder capsule. Less trigger points palpated today ; mild tenderness to moderate depth of palpation in his pect and subscapularis. Pt has been making gains in range of motion but his postural awareness and alignment continue to be impaired.  Geremias demonstrated a positive response to today's session as evidenced with his activity progression/tolerance without complaints of pain.   Geremias Is progressing well  towards his goals.   Pt prognosis is Good.     Pt will continue to benefit from skilled outpatient physical therapy to address the deficits listed in the problem list box on initial evaluation, provide pt/family education and to maximize pt's level of independence in the home and community environment.     Pt's spiritual, cultural and educational needs considered and pt agreeable to plan of care and goals.     Anticipated barriers to physical therapy: pt has a busy work schedule, multiple joints involved    Goals:   Short Term Goals: In 4 weeks   1.Pt to be educated on HEP. progressing  2.Patient to increase GH ROM to 165 or better. progerssing  3.Patient to have no pain with shoulder internal rotation at 90 degrees. progressing  4.Patient to have pain less than 4/10 with lifting at the gym. progressing  5.Patient to improve score on the FOTO by 10%.  6. Pt to be educated on postural and body mechanics awareness.     Long Term Goals: In 10 weeks 2/17/22  1. Patient to improve score on the FOTO to 27% limitation or better.  2. Patient to demo increase in UE strength to 4/5 with the lower traps  3. Patient to demonstrate increased cervical extension to 60 degrees or better  4. Patient to demo shoulder abduction up to 3# without pain  5. Patient to perform daily activities including single arm lifting overhead up to 10 # or more without limitation to simulate some home maintenance tasks.       PLAN   Continue Plan of Care (POC) and progress per patient tolerance.    Eun Myrick, WYATT

## 2022-01-20 ENCOUNTER — CLINICAL SUPPORT (OUTPATIENT)
Dept: REHABILITATION | Facility: HOSPITAL | Age: 72
End: 2022-01-20
Payer: MEDICARE

## 2022-01-20 DIAGNOSIS — M25.611 DECREASED ROM OF RIGHT SHOULDER: ICD-10-CM

## 2022-01-20 DIAGNOSIS — M25.511 ACUTE PAIN OF RIGHT SHOULDER: ICD-10-CM

## 2022-01-20 DIAGNOSIS — R29.898 DECREASED ROM OF NECK: ICD-10-CM

## 2022-01-20 PROCEDURE — 97110 THERAPEUTIC EXERCISES: CPT | Mod: KX,HCNC,CQ

## 2022-01-20 PROCEDURE — 97140 MANUAL THERAPY 1/> REGIONS: CPT | Mod: KX,HCNC,CQ

## 2022-01-20 NOTE — PROGRESS NOTES
Physical Therapist Assistant Daily Treatment Note      Name: Geremias Quijano  Clinic Number: [id number]    Therapy Diagnosis:   Encounter Diagnoses   Name Primary?    Decreased ROM of right shoulder     Acute pain of right shoulder     Decreased ROM of neck      Physician: Martínez White MD    Visit Date: 1/20/2022   Physician Orders: PT to eval and treat  Medical Diagnosis from Referral: M67.911 (ICD-10-CM) - Rotator cuff disorder, right  Evaluation Date: 12/9/2021  Authorization Period Expiration: 6/16/22  Plan of Care Expiration: 2/17/22  Visit # / Visits authorized: 6/20;    (6 in 2021)  Progress note due: 10th visit or 30 days from eval    Foto: 2/3     PTA VISIT 2/5     Precautions: Standard, HTN, mild Loss of hearing     Time In  7:59  Time Out: 8:58  Total Time: 55 minutes    SUBJECTIVE     Today, pt reports: felt better after last session  He was compliant with home exercise program.  Response to previous treatment: no issues; pain relief with movement  Functional change increased range of motion with activities    Pre-Treatment Pain: 0/10 at rest  Post-Treatment Pain:  0-1/10  Location: anterior R shoulder/pectoral region      TREATMENT   Shoulder ROM:   at eval; 1/13/2022  Active/Passive Joint Range Right   Flexion 150 to 155 p!;same   ABDuction 140 to 148   External Rotation 60; 80 at 90 abduction and 70 at 0 abduction   Internal Rotation 50 p!;pain free at 75 degrees unless shoulder abducted at 90 then painful at 30 degrees   Adduction 15 p!; pain free at 28 degrees   Extension 35   1/13/2022:  Pain with motions of flexion and shoulder abduction/internal rotation but this resolves when he doesn't overfire the UT at least allowing for 20 degrees more of abduction  T spine rotation 10% L 30 % R seated; improved to 50    -------     Geremias received therapeutic exercises to develop strength, endurance, ROM, flexibility, posture and core stabilization for 40 minutes including:    Standing UBE for mobility  "BW 5 min  Supine chin tuck w/lift (head just hovering off mat staying neutral) 5" x6  Quadruped chin tuck / shoulders protracted with cervical rotation x10  Standing lateral trunk stretch on R x10 ; add in rotation x5  Standing Lat stretch x10  Shoulder ER at 90/90 green theraband 2x10   Scapular protraction/retraction 3# 2x10 per arm  Prone chin tuck with w into y  3x5  90/90 isometric ER w/lateral walk out against GTB hold 10" x5  T spine extension on foam roller placed at 3 levels--5 reps ea level followed by a static hold x10 sec  Standing YTI w/ YTB 2x10      Deferred:  Side lying R shoulder abduction to 90 3x10  Side lying R shoulder external rotation 2# 3x 10  1/2 roll series:   ---postural/pect stretch supine 2 min  ---alternating arms x20  ---horizontal abduction RTB x10  ---UE bettie slides x10  ---diagonals x10  Wind shield wipers for hip stretch x 2min  Zak curl 15# KB with 3 deep breaths at the bottom 1x 10  Horizontal abduction isometrics (pulses) bilaterally looped RTB 2x15  Supine bilateral Y's w/crossed YTB 2x10 --increase to RTB next  Quadruped cat/camel x10  Quadruped thread the needle 2x5 ea  Matrix trunk rotation without weight initially for range of motion then added 20# 2x10 ea side  Single arm press ups from goal post position w/ blue looped resistive band 2x10 ea arm  Crossed lower A pulley's 20 # per arm x10; 30# x10   Open book 2x 10 per side then 1x 10 with PT overpressure  Supine figure 4 hip stretch B  Supine piriformis stretch B  Hand to heel rocks   Standing roll downs  Face pulls 20 # pulley per arm  Protraction/retraction with blue band in door way 2x 10 (in lat stretch position)   Sit to stand with 15# KB on R to pull in RTC stabilizers 2x 8  Crossed W 10 # per arm 2x 10  Cervical rotation w/towel assist x8 ea side    Geremias received the following manual therapy techniques x 15 min to include:  GHJ mobs  Scapular mobs superior to inferior  Soft tissue mobs to the Upper traps, " infraspinatus, subscapularis, supraspinatus, lats and serratus to help free up the scapula      Geremias participated in neuromuscular re-education activities to improve: Balance, Coordination, Proprioception and Posture for - minutes. The following activities were included:    Geremias received the following direct contact modalities after being cleared for contraindications:     Geremias received the following supervised modalities after being cleared for contradictions:     Geremias received hot pack for - minutes to -.    Geremias received cold pack for - minutes to -.  Geremias received electrical stimulation        Home Exercises Provided and Patient Education Provided     Education/Self-Care provided: (during therex)   Patient educated on the importance of improved core and upper and lower extremity strength in order to improve alignment of the spine and upper and lower extremities with static positions and dynamic movement.    Patient educated on the importance of strong core and lower extremity musculature in order to improve both static and dynamic balance, improve gait mechanics, reduce fall risk and improve household and community mobility.       Written Home Exercises Provided: Patient instructed to cont prior HEP.  Exercises were reviewed and Geremias was able to demonstrate them prior to the end of the session.  Geremias demonstrated good  understanding of the education provided.     See EMR under Patient Instructions for exercises provided prior visit.    ASSESSMENT   Geremias presented to therapy today reporting a bit more pain relief with activities after his last session. Mild tenderness to moderate depth of palpation in his pect and subscapularis. Pt has been making gains in range of motion but his postural awareness and alignment continue to be mildly impaired although improved. Geremias did maintain good neck alignment with his standing activities today. Scap mobility and control was also improved.   Geremias demonstrated a positive  response to today's session as evidenced with his activity progression/tolerance without complaints of pain.   Geremias Is progressing well towards his goals.   Pt prognosis is Good.     Pt will continue to benefit from skilled outpatient physical therapy to address the deficits listed in the problem list box on initial evaluation, provide pt/family education and to maximize pt's level of independence in the home and community environment.     Pt's spiritual, cultural and educational needs considered and pt agreeable to plan of care and goals.     Anticipated barriers to physical therapy: pt has a busy work schedule, multiple joints involved    Goals:   Short Term Goals: In 4 weeks   1.Pt to be educated on HEP. progressing  2.Patient to increase GH ROM to 165 or better. progerssing  3.Patient to have no pain with shoulder internal rotation at 90 degrees. progressing  4.Patient to have pain less than 4/10 with lifting at the gym. progressing  5.Patient to improve score on the FOTO by 10%.  6. Pt to be educated on postural and body mechanics awareness.     Long Term Goals: In 10 weeks 2/17/22  1. Patient to improve score on the FOTO to 27% limitation or better.  2. Patient to demo increase in UE strength to 4/5 with the lower traps  3. Patient to demonstrate increased cervical extension to 60 degrees or better  4. Patient to demo shoulder abduction up to 3# without pain  5. Patient to perform daily activities including single arm lifting overhead up to 10 # or more without limitation to simulate some home maintenance tasks.       PLAN   Continue Plan of Care (POC) and progress per patient tolerance.    Eun Myrick, PTA

## 2022-01-25 ENCOUNTER — CLINICAL SUPPORT (OUTPATIENT)
Dept: REHABILITATION | Facility: HOSPITAL | Age: 72
End: 2022-01-25
Payer: MEDICARE

## 2022-01-25 DIAGNOSIS — R29.898 DECREASED ROM OF NECK: ICD-10-CM

## 2022-01-25 DIAGNOSIS — M25.511 ACUTE PAIN OF RIGHT SHOULDER: ICD-10-CM

## 2022-01-25 DIAGNOSIS — M25.611 DECREASED ROM OF RIGHT SHOULDER: ICD-10-CM

## 2022-01-25 PROCEDURE — 97110 THERAPEUTIC EXERCISES: CPT | Mod: HCNC

## 2022-01-25 PROCEDURE — 97014 ELECTRIC STIMULATION THERAPY: CPT | Mod: HCNC

## 2022-01-25 NOTE — PROGRESS NOTES
Physical Therapist Daily Treatment Note      Name: Geremias Quijano  Clinic Number: [id number]    Therapy Diagnosis:   Encounter Diagnoses   Name Primary?    Decreased ROM of right shoulder     Acute pain of right shoulder     Decreased ROM of neck      Physician: Martínez White MD    Visit Date: 1/25/2022   Physician Orders: PT to eval and treat  Medical Diagnosis from Referral: M67.911 (ICD-10-CM) - Rotator cuff disorder, right  Evaluation Date: 12/9/2021  Authorization Period Expiration: 6/16/22  Plan of Care Expiration: 2/17/22  Visit # / Visits authorized: 7/20; (6 in 2021)  Progress note due: 10th visit or 30 days from eval    Foto: 2/3     PTA VISIT -/5     Precautions: Standard, HTN, mild Loss of hearing     Time In: 7:50 am  Time Out: 8:50 am  Total Time: 35 minutes    SUBJECTIVE     Today, pt reports: that his shoulder feels like it is getting better.  He was compliant with home exercise program.  Response to previous treatment: no issues; pain relief with movement  Functional change increased range of motion with activities    Pain: 0/10 at rest  Location: anterior R shoulder/pectoral region    TREATMENT      Geremias received therapeutic exercises to develop strength, endurance, ROM, flexibility, posture and core stabilization for 30 minutes including:    Standing UBE for mobility BW/FW 6 min  Walking with 14# ball for postural training  Overhead press SA 10# KB 3 x 10  Overhead Press Bilateral with 20# colon machine x 15 reps focus on thoracic extension  Eleazar pose  Incline downward dog with opposite touches  Thread the needle  Scapular hangs on smith machine  TRX band rows 2 x 10 3 second holds    Geremias received the following manual therapy techniques x 5 min to include:  GHJ mobs  Scapular mobs superior to inferior  Soft tissue mobs to the Upper traps, infraspinatus    FDN to R A/P shoulder joint and 2 points to R deltoid x 4 points total with 2 hz estim for 10 minutes with good tolerance and no  increase in adverse symptoms.    Home Exercises Provided and Patient Education Provided     Education/Self-Care provided: (during therex)   Patient educated on the importance of improved core and upper and lower extremity strength in order to improve alignment of the spine and upper and lower extremities with static positions and dynamic movement.    Patient educated on the importance of strong core and lower extremity musculature in order to improve both static and dynamic balance, improve gait mechanics, reduce fall risk and improve household and community mobility.     Written Home Exercises Provided: Patient instructed to cont prior HEP.  Exercises were reviewed and Geremias was able to demonstrate them prior to the end of the session.  Geremias demonstrated good  understanding of the education provided.     See EMR under Patient Instructions for exercises provided prior visit.    ASSESSMENT   Patient demonstrated great tolerance to strengthening and more thoracic and overhead work this session. ROM to R shoulder looks really great, but difficulty with abduction so tried a different area with FDN with good tolerance.    Geremias Is progressing well towards his goals.   Pt prognosis is Good.     Pt will continue to benefit from skilled outpatient physical therapy to address the deficits listed in the problem list box on initial evaluation, provide pt/family education and to maximize pt's level of independence in the home and community environment.     Pt's spiritual, cultural and educational needs considered and pt agreeable to plan of care and goals.     Anticipated barriers to physical therapy: pt has a busy work schedule, multiple joints involved    Goals:   Short Term Goals: In 4 weeks   1.Pt to be educated on HEP. progressing  2.Patient to increase GH ROM to 165 or better. progerssing  3.Patient to have no pain with shoulder internal rotation at 90 degrees. progressing  4.Patient to have pain less than 4/10 with  lifting at the gym. progressing  5.Patient to improve score on the FOTO by 10%. progressing  6. Pt to be educated on postural and body mechanics awareness. progressing     Long Term Goals: In 10 weeks 2/17/22  1. Patient to improve score on the FOTO to 27% limitation or better. progressing  2. Patient to demo increase in UE strength to 4/5 with the lower traps progressing  3. Patient to demonstrate increased cervical extension to 60 degrees or better progressing  4. Patient to demo shoulder abduction up to 3# without pain progressing  5. Patient to perform daily activities including single arm lifting overhead up to 10 # or more without limitation to simulate some home maintenance tasks. progressing       PLAN   Continue Plan of Care (POC) and progress per patient tolerance.    Blanca Martin, PT

## 2022-02-01 ENCOUNTER — CLINICAL SUPPORT (OUTPATIENT)
Dept: REHABILITATION | Facility: HOSPITAL | Age: 72
End: 2022-02-01
Payer: MEDICARE

## 2022-02-01 DIAGNOSIS — M25.611 DECREASED ROM OF RIGHT SHOULDER: ICD-10-CM

## 2022-02-01 DIAGNOSIS — M25.511 ACUTE PAIN OF RIGHT SHOULDER: ICD-10-CM

## 2022-02-01 DIAGNOSIS — R29.898 DECREASED ROM OF NECK: ICD-10-CM

## 2022-02-01 PROCEDURE — 97110 THERAPEUTIC EXERCISES: CPT | Mod: KX,HCNC,CQ

## 2022-02-01 PROCEDURE — 97140 MANUAL THERAPY 1/> REGIONS: CPT | Mod: KX,HCNC,CQ

## 2022-02-01 NOTE — PROGRESS NOTES
"  Physical Therapist Assistant Daily Treatment Note      Name: Geremias Quijano  Clinic Number: [id number]    Therapy Diagnosis:   Encounter Diagnoses   Name Primary?    Decreased ROM of right shoulder     Acute pain of right shoulder     Decreased ROM of neck      Physician: Martínez White MD    Visit Date: 2/1/2022   Physician Orders: PT to eval and treat  Medical Diagnosis from Referral: M67.911 (ICD-10-CM) - Rotator cuff disorder, right  Evaluation Date: 12/9/2021  Authorization Period Expiration: 6/16/22  Plan of Care Expiration: 2/17/22  Visit # / Visits authorized: 8/20; (5 in 2021)  Progress note due: 10th visit or 30 days from eval    Foto: 2/3     PTA VISIT 1/5     Precautions: Standard, HTN, mild Loss of hearing     Time In: 7:45 am  Time Out: 8:47 am  Total Time: 58 minutes    SUBJECTIVE     Today, pt reports: "I think my shoulder is getting better"  He was compliant with home exercise program.  Response to previous treatment: no issues; pain relief with movement  Functional change increased range of motion with activities    Pain: 0/10 at rest  Location: anterior R shoulder/pectoral region    TREATMENT      Geremias received therapeutic exercises to develop strength, endurance, ROM, flexibility, posture and core stabilization for 45 minutes including:    Standing UBE for mobility BW 5 min  MedX torso rotations 20# x10 ea side  T spine extension over foam x12 (2 levels)  Side lying shoulder abduction 3x10  Supine ER at 45 degrees of abduction 3# w/eccentric return 3x10  Prone I's 3# 2x10  Prone T's 2# 2x10  Pulley crossed W 20# per arm x10; 30# per arm x10  Overhead press SA 10# KB 3 x 10  Standing GTB isometric 10" hold w/arm at 90/ 90 x5  Standing YTI red resistive cords 2x10      Geremias received the following manual therapy techniques x 13 min to include:  GHJ mobs  Scapular mobs superior to inferior  Soft tissue mobs to the Upper traps, infraspinatus, pects    Home Exercises Provided and Patient " "Education Provided     Education/Self-Care provided: (during therex)   Patient educated on the importance of improved core and upper and lower extremity strength in order to improve alignment of the spine and upper and lower extremities with static positions and dynamic movement.    Patient educated on the importance of strong core and lower extremity musculature in order to improve both static and dynamic balance, improve gait mechanics, reduce fall risk and improve household and community mobility.     Written Home Exercises Provided: Patient instructed to cont prior HEP.  Exercises were reviewed and Geremias was able to demonstrate them prior to the end of the session.  Geremias demonstrated good  understanding of the education provided.     See EMR under Patient Instructions for exercises provided prior visit.    ASSESSMENT   Geremias Is progressing well towards his goals.   Geremias presented to therapy with no new complaints; feels like there is "improvement". GHJ mobility is improving with joint mobilizations; still having some impaired stabilization in the joint with some activities but this has improved overall. Verbal cueing required intermittently to decrease upper trap firing/compensation ; patient able to self correct. Geremias demonstrated a positive response to today's session as evidenced with his activity tolerance and no complaints of pain other than some pain initially with side lying shoulder abduction.   Pt prognosis is Good.     Pt will continue to benefit from skilled outpatient physical therapy to address the deficits listed in the problem list box on initial evaluation, provide pt/family education and to maximize pt's level of independence in the home and community environment.     Pt's spiritual, cultural and educational needs considered and pt agreeable to plan of care and goals.     Anticipated barriers to physical therapy: pt has a busy work schedule, multiple joints involved    Goals:   Short Term Goals: " In 4 weeks   1.Pt to be educated on HEP. progressing  2.Patient to increase GH ROM to 165 or better. progerssing  3.Patient to have no pain with shoulder internal rotation at 90 degrees. progressing  4.Patient to have pain less than 4/10 with lifting at the gym. progressing  5.Patient to improve score on the FOTO by 10%. progressing  6. Pt to be educated on postural and body mechanics awareness. progressing     Long Term Goals: In 10 weeks 2/17/22  1. Patient to improve score on the FOTO to 27% limitation or better. progressing  2. Patient to demo increase in UE strength to 4/5 with the lower traps progressing  3. Patient to demonstrate increased cervical extension to 60 degrees or better progressing  4. Patient to demo shoulder abduction up to 3# without pain progressing  5. Patient to perform daily activities including single arm lifting overhead up to 10 # or more without limitation to simulate some home maintenance tasks. progressing       PLAN   Continue Plan of Care (POC) and progress per patient tolerance.    Eun Myrick, PTA

## 2022-02-03 ENCOUNTER — CLINICAL SUPPORT (OUTPATIENT)
Dept: REHABILITATION | Facility: HOSPITAL | Age: 72
End: 2022-02-03
Payer: MEDICARE

## 2022-02-03 DIAGNOSIS — M25.511 ACUTE PAIN OF RIGHT SHOULDER: ICD-10-CM

## 2022-02-03 DIAGNOSIS — R29.898 DECREASED ROM OF NECK: ICD-10-CM

## 2022-02-03 DIAGNOSIS — M25.611 DECREASED ROM OF RIGHT SHOULDER: ICD-10-CM

## 2022-02-03 PROCEDURE — 97140 MANUAL THERAPY 1/> REGIONS: CPT | Mod: HCNC

## 2022-02-03 PROCEDURE — 97110 THERAPEUTIC EXERCISES: CPT | Mod: HCNC

## 2022-02-03 NOTE — PROGRESS NOTES
Physical Therapist  Daily Treatment Note      Name: Geremias Quijano  Clinic Number: [id number]    Therapy Diagnosis:   Encounter Diagnoses   Name Primary?    Decreased ROM of right shoulder     Acute pain of right shoulder     Decreased ROM of neck      Physician: Martínez White MD    Visit Date: 2/3/2022   Physician Orders: PT to eval and treat  Medical Diagnosis from Referral: M67.911 (ICD-10-CM) - Rotator cuff disorder, right  Evaluation Date: 12/9/2021  Authorization Period Expiration: 6/16/22  Plan of Care Expiration: 2/17/22  Visit # / Visits authorized: 9/20; (5 in 2021)  Progress note due: 10th visit or 30 days from eval    Foto: 2/3     PTA VISIT 0/5     Precautions: Standard, HTN, mild Loss of hearing     Time In: 7:45 am  Time Out: 8:46 am  Total Time: 61 minutes    SUBJECTIVE     Today, pt reports: he is getting more flexible in the shoulder but abduction still pinches.  He was compliant with home exercise program.  Response to previous treatment: no issues; pain relief with movement  Functional change increased range of motion with activities    Pain: 0/10 at rest  Location: anterior R shoulder/pectoral region    TREATMENT   T spine extension 10 degrees today; PT to assess rotation and add rotation machine on the follow up to assist in T spine motion.   Shoulder ROM:eval/today     Active/Passive Joint Range Right Left   Flexion 150 p!/160 no pain 140   ABDuction 140/150 135   External Rotation 60/70 65   Internal Rotation 50 p!/60 P! with abduction/IR combined at 60 degrees on arrival and 95 when leaving today 70   Adduction 15 p!/25 25   Extension 30 30       Geremias received therapeutic exercises to develop strength, endurance, ROM, flexibility, posture and core stabilization for 15 minutes including:    Upper trap shrugs B 2x 10 then unilaterally 2x 10 R UE  T spine extension 2x 10  Wall push up 2x 10 then add push up plus 2x 10  Zak curls 20# 2x 10 then 3x 5 sec lower than the  "floor    Deferred:  Standing UBE for mobility BW 5 min  MedX torso rotations 20# x10 ea side  T spine extension over foam x12 (2 levels)  Side lying shoulder abduction 3x10  Supine ER at 45 degrees of abduction 3# w/eccentric return 3x10  Prone I's 3# 2x10  Prone T's 2# 2x10  Pulley crossed W 20# per arm x10; 30# per arm x10  Overhead press SA 10# KB 3 x 10  Standing GTB isometric 10" hold w/arm at 90/ 90 x5  Standing YTI red resistive cords 2x10      Geremias received the following manual therapy techniques x 39 min to include:    PT worked with manual therapy to mobilize the R scapula working on the medial and lateral border tissues, upward mobs and depression to increase ROM in the upper trap and levator and scalenes to reduce guarding. PT continued with Soft tissue mobs of the pect minor, subscapularis, lat, infraspinatus, and posterior deltoid to reduce trigger points. PT used palpation to determine DN placement in the subscapularis, lat and R pect minor as well as the infraspinatus with estim applied to the pect minor and subscapularis on the R.    PT used manual estim to the pect minor and subscapularis for 6 min in total with DN    Home Exercises Provided and Patient Education Provided     Education/Self-Care provided: (during therex)   Patient educated on the importance of improved core and upper and lower extremity strength in order to improve alignment of the spine and upper and lower extremities with static positions and dynamic movement.    Patient educated on the importance of strong core and lower extremity musculature in order to improve both static and dynamic balance, improve gait mechanics, reduce fall risk and improve household and community mobility.     Written Home Exercises Provided: Patient instructed to cont prior HEP.  Exercises were reviewed and Geremias was able to demonstrate them prior to the end of the session.  Geremias demonstrated good  understanding of the education provided.     See EMR " under Patient Instructions for exercises provided prior visit.    ASSESSMENT   Geremias Is progressing well towards his goals and finally has T spine motion with extension to 10 degrees showing good changes. Pt also now can flex his trunk to allow for lumbar reversal of curve to 6 inches from touching the floor showing great gains in total ROM in the spine allowing for more freedom of movement. PT focused on improving scapular motion and teaching pt how to actively mobilize the scapula with foam, trigger point ball and therex as he has poor scapular mechanics and proprioception but with manual cues from PT, pt was able to protract/retraction some when in shoulder flexion facing the wall when on arrival he couldn't depress and elevate.  Pt still has considerable trigger points in the pect minor and subscap and lat making scapular mobility a challenge, so PT to continue working to ease rhomboid and levator and subscapular tension to help him recover scapular mobility to reduce joint loading.  Geremias demonstrated a positive response to today's session as evidenced with his activity tolerance, increased ROM and less pain with shoulder abduction/internal rotation combined. PT to advance scapular mobility.   Pt prognosis is Good.     Pt will continue to benefit from skilled outpatient physical therapy to address the deficits listed in the problem list box on initial evaluation, provide pt/family education and to maximize pt's level of independence in the home and community environment.     Pt's spiritual, cultural and educational needs considered and pt agreeable to plan of care and goals.     Anticipated barriers to physical therapy: pt has a busy work schedule, multiple joints involved    Goals:   Short Term Goals: In 4 weeks   1.Pt to be educated on HEP. met  2.Patient to increase GH ROM to 165 or better. progerssing  3.Patient to have no pain with shoulder internal rotation at 90 degrees. progressing  4.Patient to have pain  less than 4/10 with lifting at the gym. progressing  5.Patient to improve score on the FOTO by 10%. progressing  6. Pt to be educated on postural and body mechanics awareness. progressing     Long Term Goals: In 10 weeks 2/17/22  1. Patient to improve score on the FOTO to 27% limitation or better. progressing  2. Patient to demo increase in UE strength to 4/5 with the lower traps progressing  3. Patient to demonstrate increased cervical extension to 60 degrees or better progressing  4. Patient to demo shoulder abduction up to 3# without pain progressing  5. Patient to perform daily activities including single arm lifting overhead up to 10 # or more without limitation to simulate some home maintenance tasks. progressing       PLAN   Continue Plan of Care (POC) and progress per patient tolerance.    Azeb Lancaster, PT

## 2022-02-08 ENCOUNTER — CLINICAL SUPPORT (OUTPATIENT)
Dept: REHABILITATION | Facility: HOSPITAL | Age: 72
End: 2022-02-08
Payer: MEDICARE

## 2022-02-08 DIAGNOSIS — M25.611 DECREASED ROM OF RIGHT SHOULDER: ICD-10-CM

## 2022-02-08 DIAGNOSIS — R29.898 DECREASED ROM OF NECK: ICD-10-CM

## 2022-02-08 DIAGNOSIS — M25.511 ACUTE PAIN OF RIGHT SHOULDER: ICD-10-CM

## 2022-02-08 PROCEDURE — 97140 MANUAL THERAPY 1/> REGIONS: CPT | Mod: HCNC,CQ

## 2022-02-08 PROCEDURE — 97110 THERAPEUTIC EXERCISES: CPT | Mod: HCNC,CQ

## 2022-02-08 NOTE — PROGRESS NOTES
Physical Therapist Assistant Daily Treatment Note      Name: Geremias Quijano  Clinic Number: [id number]    Therapy Diagnosis:   Encounter Diagnoses   Name Primary?    Decreased ROM of right shoulder     Acute pain of right shoulder     Decreased ROM of neck      Physician: Martínez White MD    Visit Date: 2/8/2022   Physician Orders: PT to eval and treat  Medical Diagnosis from Referral: M67.911 (ICD-10-CM) - Rotator cuff disorder, right  Evaluation Date: 12/9/2021  Authorization Period Expiration: 6/16/22  Plan of Care Expiration: 2/17/22  Visit # / Visits authorized: 10/20; (5 in 2021)  Progress note due: 10th visit or 30 days from eval    Foto: 2/3     PTA VISIT 1/5     Precautions: Standard, HTN, mild Loss of hearing     Time In: 7:45 am  Time Out: 8:40 am  Total Time: 53 minutes    SUBJECTIVE     Today, pt reports: feeling sore otherwise no significant change from previous session.   He was compliant with home exercise program.  Response to previous treatment: sore  Functional change increased range of motion with activities    Pain: 0/10 at rest  Location: anterior R shoulder/pectoral region    TREATMENT   T spine extension 10 degrees 2/3/22  Shoulder ROM: 2/3/22     Active/Passive Joint Range Right Left   Flexion 150 p!/160 no pain 140   ABDuction 140/150 135   External Rotation 60/70 65   Internal Rotation 50 p!/60 P! with abduction/IR combined at 60 degrees on arrival and 95 when leaving today 70   Adduction 15 p!/25 25   Extension 30 30       Geremias received therapeutic exercises to develop strength, endurance, ROM, flexibility, posture and core stabilization for 38 minutes including:    Standing UBE for mobility BW 5 min  Supine isometric horizontal abduction GTB 2x10  MedX trunk rotation 20# 2x10 ea  Wall slides w/RTB x10  T spine extension over foam roll 3 levels x5 ea  Wall push up plus 2x 10   Zak curls 20# 2x 10 then 5x 5 sec on power step w/diaphragmatic breathing     Deferred:  Side lying  "shoulder abduction 3x10  Supine ER at 45 degrees of abduction 3# w/eccentric return 3x10  Prone I's 3# 2x10  Prone T's 2# 2x10  Pulley crossed W 20# per arm x10; 30# per arm x10  Overhead press SA 10# KB 3 x 10  Standing GTB isometric 10" hold w/arm at 90/ 90 x5  Standing YTI red resistive cords 2x10      Geremias received the following manual therapy techniques x 15 min to include:  STM of the pect minor, subscapularis, lat, infraspinatus, and posterior deltoid   Scap and GHJ mobs        Home Exercises Provided and Patient Education Provided     Education/Self-Care provided: (during therex)   Patient educated on the importance of improved core and upper and lower extremity strength in order to improve alignment of the spine and upper and lower extremities with static positions and dynamic movement.    Patient educated on the importance of strong core and lower extremity musculature in order to improve both static and dynamic balance, improve gait mechanics, reduce fall risk and improve household and community mobility.     Written Home Exercises Provided: Patient instructed to cont prior HEP.  Exercises were reviewed and Geremias was able to demonstrate them prior to the end of the session.  Geremias demonstrated good  understanding of the education provided.     See EMR under Patient Instructions for exercises provided prior visit.    ASSESSMENT   Geremias Is progressing well towards his goals .  Geremias presented to therapy today without significant changes reported. Continued with manual techniques to increase muscle extensibility, shoulder girdle range of motion and to decrease muscle tension/TrP's. He is demonstrating improved scapular control with standing free exercises but requires cueing for techniques to engage the proper muscles without upper trap compensation. Geremias demonstrated a positive response to today's session as evidenced with his activity tolerance and no complaints of pain.    Pt prognosis is Good.     Pt will " continue to benefit from skilled outpatient physical therapy to address the deficits listed in the problem list box on initial evaluation, provide pt/family education and to maximize pt's level of independence in the home and community environment.     Pt's spiritual, cultural and educational needs considered and pt agreeable to plan of care and goals.     Anticipated barriers to physical therapy: pt has a busy work schedule, multiple joints involved    Goals:   Short Term Goals: In 4 weeks   1.Pt to be educated on HEP. met  2.Patient to increase GH ROM to 165 or better. progerssing  3.Patient to have no pain with shoulder internal rotation at 90 degrees. progressing  4.Patient to have pain less than 4/10 with lifting at the gym. progressing  5.Patient to improve score on the FOTO by 10%. progressing  6. Pt to be educated on postural and body mechanics awareness. progressing     Long Term Goals: In 10 weeks 2/17/22  1. Patient to improve score on the FOTO to 27% limitation or better. progressing  2. Patient to demo increase in UE strength to 4/5 with the lower traps progressing  3. Patient to demonstrate increased cervical extension to 60 degrees or better progressing  4. Patient to demo shoulder abduction up to 3# without pain progressing  5. Patient to perform daily activities including single arm lifting overhead up to 10 # or more without limitation to simulate some home maintenance tasks. progressing       PLAN   Continue Plan of Care (POC) and progress per patient tolerance.    Eun Myrick, PTA

## 2022-02-10 ENCOUNTER — CLINICAL SUPPORT (OUTPATIENT)
Dept: REHABILITATION | Facility: HOSPITAL | Age: 72
End: 2022-02-10
Payer: MEDICARE

## 2022-02-10 DIAGNOSIS — M25.511 ACUTE PAIN OF RIGHT SHOULDER: ICD-10-CM

## 2022-02-10 DIAGNOSIS — M25.611 DECREASED ROM OF RIGHT SHOULDER: ICD-10-CM

## 2022-02-10 DIAGNOSIS — R29.898 DECREASED ROM OF NECK: ICD-10-CM

## 2022-02-10 PROCEDURE — 97110 THERAPEUTIC EXERCISES: CPT | Mod: HCNC

## 2022-02-10 NOTE — PROGRESS NOTES
"  Physical Therapist  Daily Treatment Note      Name: Geremias Quijano  Clinic Number: [id number]    Therapy Diagnosis:   Encounter Diagnoses   Name Primary?    Decreased ROM of right shoulder     Acute pain of right shoulder     Decreased ROM of neck      Physician: Martínez White MD    Visit Date: 2/10/2022   Physician Orders: PT to eval and treat  Medical Diagnosis from Referral: M67.911 (ICD-10-CM) - Rotator cuff disorder, right  Evaluation Date: 12/9/2021  Authorization Period Expiration: 6/16/22  Plan of Care Expiration: 2/17/22  Visit # / Visits authorized: 10/20; (5 in 2021)  Progress note due: 10th visit or 30 days from eval    Foto: 2/3     PTA VISIT 1/5     Precautions: Standard, HTN, mild Loss of hearing     Time In: 7:45 am  Time Out: 8:40 am  Total Time: 53 minutes    SUBJECTIVE     Today, pt reports: feeling sore otherwise no significant change from previous session.   He was compliant with home exercise program.  Response to previous treatment: sore  Functional change increased range of motion with activities    Pain: 0/10 at rest  Location: anterior R shoulder/pectoral region    TREATMENT       Geremias received therapeutic exercises to develop strength, endurance, ROM, flexibility, posture and core stabilization for 0 minutes including:    Standing UBE for mobility BW 5 min  Supine isometric horizontal abduction GTB 2x10  MedX trunk rotation 20# 2x10 ea  Wall slides w/RTB x10  T spine extension over foam roll 3 levels x5 ea  Wall push up plus 2x 10   Zak curls 20# 2x 10 then 5x 5 sec on power step w/diaphragmatic breathing     Deferred:  Side lying shoulder abduction 3x10  Supine ER at 45 degrees of abduction 3# w/eccentric return 3x10  Prone I's 3# 2x10  Prone T's 2# 2x10  Pulley crossed W 20# per arm x10; 30# per arm x10  Overhead press SA 10# KB 3 x 10  Standing GTB isometric 10" hold w/arm at 90/ 90 x5  Standing YTI red resistive cords 2x10      Geremias received the following manual therapy " techniques x 45 min to include:  STM of the pect minor, subscapularis, subscapularis, rhomboids, serratus anterior, levator scapula, upper trap, teres minor, major and infraspinatus. PT focused on reducing trigger points in the pect minor, subscapularis, levator and rhomoboids to improve scapular mobility and reduce anterior humeral tipping. PT used scapular mobs to increase upward rotation, abduction and posterior tipping. PT added in DN to reduce tension in the subscapularis, pect minor, serratus anterior (with hand held NM estim 1 min), the lat and teres minor then added DN to activate the lower trap (with hand held NM estim 1 min).        Home Exercises Provided and Patient Education Provided     Education/Self-Care provided: (during therex)   Patient educated on the importance of improved core and upper and lower extremity strength in order to improve alignment of the spine and upper and lower extremities with static positions and dynamic movement.    Patient educated on the importance of strong core and lower extremity musculature in order to improve both static and dynamic balance, improve gait mechanics, reduce fall risk and improve household and community mobility.     Written Home Exercises Provided: Patient instructed to cont prior HEP.  Exercises were reviewed and Geremias was able to demonstrate them prior to the end of the session.  Geremias demonstrated good  understanding of the education provided.     See EMR under Patient Instructions for exercises provided prior visit.    ASSESSMENT   Geremias Is progressing well towards his goals . PT noted his humeral head is still anterior and the scapula is tipping forward with R shoulder so PT continued working on manual to help mobilize the scapular and reduce internal rotator tension as well as worked on mobilizing the scapula to rotate up and tip posterior rather than anterior. PT encouraged lower trap and mid trap work for his HEP to gain functional ROM after the  MT today as he had no time left in the session to work on therex. Pt still needs aggressive manual therapy to reduce humeral anterior tipping as well as it doesn't ER as well as it needs and drives forward. PT to work on improving capsular mobility as well as the subscapularis is very tight and the posterior inferior capsule. Pt has multiple trigger points in the subscapularis, pect minor, serratus, and teres muscles to continue addressing. PT to dry needle the levator and work on neck ROM as well to reduce pulling on the scapula.      Pt prognosis is Good.     Pt will continue to benefit from skilled outpatient physical therapy to address the deficits listed in the problem list box on initial evaluation, provide pt/family education and to maximize pt's level of independence in the home and community environment.     Pt's spiritual, cultural and educational needs considered and pt agreeable to plan of care and goals.     Anticipated barriers to physical therapy: pt has a busy work schedule, multiple joints involved    Goals:   Short Term Goals: In 4 weeks   1.Pt to be educated on HEP. met  2.Patient to increase GH ROM to 165 or better. progerssing  3.Patient to have no pain with shoulder internal rotation at 90 degrees. progressing  4.Patient to have pain less than 4/10 with lifting at the gym. progressing  5.Patient to improve score on the FOTO by 10%. progressing  6. Pt to be educated on postural and body mechanics awareness. progressing     Long Term Goals: In 10 weeks 2/17/22  1. Patient to improve score on the FOTO to 27% limitation or better. progressing  2. Patient to demo increase in UE strength to 4/5 with the lower traps progressing  3. Patient to demonstrate increased cervical extension to 60 degrees or better progressing  4. Patient to demo shoulder abduction up to 3# without pain progressing  5. Patient to perform daily activities including single arm lifting overhead up to 10 # or more without  limitation to simulate some home maintenance tasks. progressing       PLAN   Continue Plan of Care (POC) and progress per patient tolerance.    Azeb Lancaster, PT

## 2022-02-15 ENCOUNTER — CLINICAL SUPPORT (OUTPATIENT)
Dept: REHABILITATION | Facility: HOSPITAL | Age: 72
End: 2022-02-15
Payer: MEDICARE

## 2022-02-15 DIAGNOSIS — M25.511 ACUTE PAIN OF RIGHT SHOULDER: ICD-10-CM

## 2022-02-15 DIAGNOSIS — R29.898 DECREASED ROM OF NECK: ICD-10-CM

## 2022-02-15 DIAGNOSIS — M25.611 DECREASED ROM OF RIGHT SHOULDER: ICD-10-CM

## 2022-02-15 PROCEDURE — 97110 THERAPEUTIC EXERCISES: CPT | Mod: HCNC

## 2022-02-15 PROCEDURE — 97140 MANUAL THERAPY 1/> REGIONS: CPT | Mod: HCNC

## 2022-02-15 PROCEDURE — 97032 APPL MODALITY 1+ESTIM EA 15: CPT | Mod: HCNC

## 2022-02-15 NOTE — PROGRESS NOTES
Physical Therapist  Daily Treatment Note and Plan of Care Update     Name: Geremias Quijano  Clinic Number: [id number]    Therapy Diagnosis:   Encounter Diagnoses   Name Primary?    Decreased ROM of right shoulder     Acute pain of right shoulder     Decreased ROM of neck      Physician: Martínez White MD    Visit Date: 2/15/2022   Physician Orders: PT to eval and treat  Medical Diagnosis from Referral: M67.911 (ICD-10-CM) - Rotator cuff disorder, right  Evaluation Date: 12/9/2021  Authorization Period Expiration: 6/16/22  Plan of Care Expiration: 2/17/22 extended today to 3/18/22  Visit # / Visits authorized: 11/20; (5 in 2021)  Progress note due: 20th visit or 30 days from 2/15/22    Foto: 2/3     PTA VISIT 1/5     Precautions: Standard, HTN, mild Loss of hearing     Time In: 7:45 am  Time Out: 8:55 am  Total Time: 70 minutes    SUBJECTIVE     Today, pt reports: feeling he can internally rotate more today after the session   He was compliant with home exercise program.  Response to previous treatment: sore  Functional change increased range of motion with activities    Pain: 0/10 at rest  Location: anterior R shoulder/pectoral region      OBJECTIVE     Shoulder ROM: at eval/today     Active/Passive Joint Range Right Left   Flexion 150 p!; 160 no pain 140; 150   ABDuction 140; 150  135   External Rotation 60;90 65   Internal Rotation 50 p!;55 mild pain if abduction at 120 degrees of flexion or more (initially at 50 degrees of flexion it was not tolerated when combined with internal rotation) 70   Adduction 15 p!;20 25   Extension 30;40 30      Pain with motions of abduction of R shoulder, adduction and internal rotation     Cervical Spine AROM at eval; today    Degrees Pain   Flexion 55;55 n   Extend 25;40 n   R side bend 25;30 n   L side bend 25;30 n   R rotation 70;80 n   L rotation 65;80 n   Muscle tension with overpressure added  T spine rotation 10% L 30 % R seated; improved to 50% ROM today  Lumbar  "flexion :only able to reach to patellas B; improved to be able to reach 4 inches below knees  Extension 5 degrees; improved to 10 degrees    Mid trap strength 4/5 B lower trap 3+/5  R shoulder external rotation at 4/5 today       TREATMENT       Geremias received therapeutic exercises to develop strength, endurance, ROM, flexibility, posture and core stabilization for 15 minutes including:    Standing UBE for mobility BW 5 min  Seated R shoulder scapular retraction with humeral posterior AROM drive while performing shoulder internal rotation (from 50 degrees abduction and 65 degrees) 3x 10 each  Prone T 2x 10 B  Prone Y at 110 degrees 2x 10    Deferrred:    Supine isometric horizontal abduction GTB 2x10  MedX trunk rotation 20# 2x10 ea  Wall slides w/RTB x10  T spine extension over foam roll 3 levels x5 ea  Wall push up plus 2x 10   Zak curls 20# 2x 10 then 5x 5 sec on power step w/diaphragmatic breathing   Side lying shoulder abduction 3x10  Supine ER at 45 degrees of abduction 3# w/eccentric return 3x10  Pulley crossed W 20# per arm x10; 30# per arm x10  Overhead press SA 10# KB 3 x 10  Standing GTB isometric 10" hold w/arm at 90/ 90 x5  Standing YTI red resistive cords 2x10      Geremias received the following manual therapy techniques x 40 (not including time for 5 min to palpate for DN and 10 min estim) min to include:  STM of the pect minor,  subscapularis, rhomboids, serratus anterior, levator scapula, upper trap, teres major, infraspinatus and suboccipitals and bicep and lateral/posterior deltoids. PT used rib mobs to reduce 4th rib and to mobilize rib 1 and 2 on the R. PT used scapular mobs to increase mobility towards upward rotation and abduction as well as to free the medial upper borders. PT used mobilization with movement to reduce tight levators B and Upper traps and suboccipitals. PT used scapular mobs to increase posterior tipping as well.  PT palpated to determine DN placement in the " subscapularis,cervical multifidus in upper T/lower C spine 2 levels as well as the R upper trap and pect minor. PT used palpation to determine DN placement with estim (8 min) to the infraspinatus, posterior deltoid, subscapularis and levator.     Pt used NMES to the muscle noted above in manual therapy for 10 min      Home Exercises Provided and Patient Education Provided     Education/Self-Care provided: (during therex)   Patient educated on the importance of improved core and upper and lower extremity strength in order to improve alignment of the spine and upper and lower extremities with static positions and dynamic movement.    Patient educated on the importance of strong core and lower extremity musculature in order to improve both static and dynamic balance, improve gait mechanics, reduce fall risk and improve household and community mobility.     Written Home Exercises Provided: Patient instructed to cont prior HEP.  Exercises were reviewed and Geremias was able to demonstrate them prior to the end of the session.  Geremias demonstrated good  understanding of the education provided.     See EMR under Patient Instructions for exercises provided prior visit.    ASSESSMENT   Geremias Is progressing well towards his goals as today he could abduct when in internal rotation past 100 degrees and was able to activate the scapula to posterior tip and perform internal rotation finding ways to abolish pain by properly activating the posterior muscles. PT intiated working on the neck more to free up the levator and UT that are limiting scapular mobility, as well. Pt had a difficult time activating deep neck muscles so PT used mobs to assist stretching so he could get into a more optimal ROM. PT to add in more work to release the upper scapula and increase functional neck ROM and subscapular tension limiting remaining complaints. Pt made substantial gains after DN and soft tissue work, today.      Pt prognosis is Good.     Pt will  continue to benefit from skilled outpatient physical therapy to address the deficits listed in the problem list box on initial evaluation, provide pt/family education and to maximize pt's level of independence in the home and community environment.     Pt's spiritual, cultural and educational needs considered and pt agreeable to plan of care and goals.     Anticipated barriers to physical therapy: pt has a busy work schedule, multiple joints involved    Goals:   Short Term Goals: In 4 weeks   1.Pt to be educated on HEP. met  2.Patient to increase GH ROM to 165 or better. Nearly met at 160 today  3.Patient to have no pain with shoulder internal rotation at 90 degrees. met  4.Patient to have pain less than 4/10 with lifting at the gym. met  5.Patient to improve score on the FOTO by 10%. progressing  6. Pt to be educated on postural and body mechanics awareness. Met  STG update to be met by 3/04/22  1. Pt shoulder flexion to 165 degrees on the R  2. Cervical extension to 45 degrees or better  3. Cervical side flexion to 40 degrees or better     Long Term Goals: In 10 weeks 2/17/22  1. Patient to improve score on the FOTO to 27% limitation or better. To be determined  2. Patient to demo increase in UE strength to 4/5 with the lower traps. progressing  3. Patient to demonstrate increased cervical extension to 60 degrees or better progressing  4. Patient to demo shoulder abduction up to 3# without pain. progressing  5. Patient to perform daily activities including single arm lifting overhead up to 10 # or more without limitation to simulate some home maintenance tasks. Progressing  LTG update to be met in 5 weeks by 3/18/22  1. Lower trap strength at 4/5  2.overhead press up to 10# without pain  3. Shoulder internal rotation ROM to increase to reaching T8 (today at L2 but able to reach to the L side of the back by end of session)       PLAN     Plan of care Certification: 12/9/2021 to 2/17/22 extend to  3/18/22.     Outpatient Physical Therapy 2 times weekly for 5 weeks (effective week of 2/15/22) to include the following interventions: Cervical/Lumbar Traction, Electrical Stimulation IFC, NMES, Gait Training, Manual Therapy, Moist Heat/ Ice, Neuromuscular Re-ed, Patient Education, Self Care, Therapeutic Activites, Therapeutic Exercise and DN.      Continue Plan of Care (POC) and progress per patient tolerance.    Azeb Lancaster, PT

## 2022-02-15 NOTE — PLAN OF CARE
Physical Therapist  Daily Treatment Note and Plan of Care Update     Name: Geremias Quijano  Clinic Number: [id number]    Therapy Diagnosis:   Encounter Diagnoses   Name Primary?    Decreased ROM of right shoulder     Acute pain of right shoulder     Decreased ROM of neck      Physician: Martínez White MD    Visit Date: 2/15/2022   Physician Orders: PT to eval and treat  Medical Diagnosis from Referral: M67.911 (ICD-10-CM) - Rotator cuff disorder, right  Evaluation Date: 12/9/2021  Authorization Period Expiration: 6/16/22  Plan of Care Expiration: 2/17/22 extended today to 3/18/22  Visit # / Visits authorized: 11/20; (5 in 2021)  Progress note due: 20th visit or 30 days from 2/15/22    Foto: 2/3     PTA VISIT 1/5     Precautions: Standard, HTN, mild Loss of hearing     Time In: 7:45 am  Time Out: 8:55 am  Total Time: 70 minutes    SUBJECTIVE     Today, pt reports: feeling he can internally rotate more today after the session   He was compliant with home exercise program.  Response to previous treatment: sore  Functional change increased range of motion with activities    Pain: 0/10 at rest  Location: anterior R shoulder/pectoral region      OBJECTIVE     Shoulder ROM: at eval/today     Active/Passive Joint Range Right Left   Flexion 150 p!; 160 no pain 140; 150   ABDuction 140; 150  135   External Rotation 60;90 65   Internal Rotation 50 p!;55 mild pain if abduction at 120 degrees of flexion or more (initially at 50 degrees of flexion it was not tolerated when combined with internal rotation) 70   Adduction 15 p!;20 25   Extension 30;40 30      Pain with motions of abduction of R shoulder, adduction and internal rotation     Cervical Spine AROM at eval; today    Degrees Pain   Flexion 55;55 n   Extend 25;40 n   R side bend 25;30 n   L side bend 25;30 n   R rotation 70;80 n   L rotation 65;80 n   Muscle tension with overpressure added  T spine rotation 10% L 30 % R seated; improved to 50% ROM today  Lumbar  "flexion :only able to reach to patellas B; improved to be able to reach 4 inches below knees  Extension 5 degrees; improved to 10 degrees    Mid trap strength 4/5 B lower trap 3+/5  R shoulder external rotation at 4/5 today       TREATMENT       Geremias received therapeutic exercises to develop strength, endurance, ROM, flexibility, posture and core stabilization for 15 minutes including:    Standing UBE for mobility BW 5 min  Seated R shoulder scapular retraction with humeral posterior AROM drive while performing shoulder internal rotation (from 50 degrees abduction and 65 degrees) 3x 10 each  Prone T 2x 10 B  Prone Y at 110 degrees 2x 10    Deferrred:    Supine isometric horizontal abduction GTB 2x10  MedX trunk rotation 20# 2x10 ea  Wall slides w/RTB x10  T spine extension over foam roll 3 levels x5 ea  Wall push up plus 2x 10   Zak curls 20# 2x 10 then 5x 5 sec on power step w/diaphragmatic breathing   Side lying shoulder abduction 3x10  Supine ER at 45 degrees of abduction 3# w/eccentric return 3x10  Pulley crossed W 20# per arm x10; 30# per arm x10  Overhead press SA 10# KB 3 x 10  Standing GTB isometric 10" hold w/arm at 90/ 90 x5  Standing YTI red resistive cords 2x10      Geremias received the following manual therapy techniques x 40 (not including time for 5 min to palpate for DN and 10 min estim) min to include:  STM of the pect minor,  subscapularis, rhomboids, serratus anterior, levator scapula, upper trap, teres major, infraspinatus and suboccipitals and bicep and lateral/posterior deltoids. PT used rib mobs to reduce 4th rib and to mobilize rib 1 and 2 on the R. PT used scapular mobs to increase mobility towards upward rotation and abduction as well as to free the medial upper borders. PT used mobilization with movement to reduce tight levators B and Upper traps and suboccipitals. PT used scapular mobs to increase posterior tipping as well.  PT palpated to determine DN placement in the " subscapularis,cervical multifidus in upper T/lower C spine 2 levels as well as the R upper trap and pect minor. PT used palpation to determine DN placement with estim (8 min) to the infraspinatus, posterior deltoid, subscapularis and levator.     Pt used NMES to the muscle noted above in manual therapy for 10 min      Home Exercises Provided and Patient Education Provided     Education/Self-Care provided: (during therex)   Patient educated on the importance of improved core and upper and lower extremity strength in order to improve alignment of the spine and upper and lower extremities with static positions and dynamic movement.    Patient educated on the importance of strong core and lower extremity musculature in order to improve both static and dynamic balance, improve gait mechanics, reduce fall risk and improve household and community mobility.     Written Home Exercises Provided: Patient instructed to cont prior HEP.  Exercises were reviewed and Geremias was able to demonstrate them prior to the end of the session.  Geremias demonstrated good  understanding of the education provided.     See EMR under Patient Instructions for exercises provided prior visit.    ASSESSMENT   Geremias Is progressing well towards his goals as today he could abduct when in internal rotation past 100 degrees and was able to activate the scapula to posterior tip and perform internal rotation finding ways to abolish pain by properly activating the posterior muscles. PT intiated working on the neck more to free up the levator and UT that are limiting scapular mobility, as well. Pt had a difficult time activating deep neck muscles so PT used mobs to assist stretching so he could get into a more optimal ROM. PT to add in more work to release the upper scapula and increase functional neck ROM and subscapular tension limiting remaining complaints. Pt made substantial gains after DN and soft tissue work, today.      Pt prognosis is Good.     Pt will  continue to benefit from skilled outpatient physical therapy to address the deficits listed in the problem list box on initial evaluation, provide pt/family education and to maximize pt's level of independence in the home and community environment.     Pt's spiritual, cultural and educational needs considered and pt agreeable to plan of care and goals.     Anticipated barriers to physical therapy: pt has a busy work schedule, multiple joints involved    Goals:   Short Term Goals: In 4 weeks   1.Pt to be educated on HEP. met  2.Patient to increase GH ROM to 165 or better. Nearly met at 160 today  3.Patient to have no pain with shoulder internal rotation at 90 degrees. met  4.Patient to have pain less than 4/10 with lifting at the gym. met  5.Patient to improve score on the FOTO by 10%. progressing  6. Pt to be educated on postural and body mechanics awareness. Met  STG update to be met by 3/04/22  1. Pt shoulder flexion to 165 degrees on the R  2. Cervical extension to 45 degrees or better  3. Cervical side flexion to 40 degrees or better     Long Term Goals: In 10 weeks 2/17/22  1. Patient to improve score on the FOTO to 27% limitation or better. To be determined  2. Patient to demo increase in UE strength to 4/5 with the lower traps. progressing  3. Patient to demonstrate increased cervical extension to 60 degrees or better progressing  4. Patient to demo shoulder abduction up to 3# without pain. progressing  5. Patient to perform daily activities including single arm lifting overhead up to 10 # or more without limitation to simulate some home maintenance tasks. Progressing  LTG update to be met in 5 weeks by 3/18/22  1. Lower trap strength at 4/5  2.overhead press up to 10# without pain  3. Shoulder internal rotation ROM to increase to reaching T8 (today at L2 but able to reach to the L side of the back by end of session)       PLAN     Plan of care Certification: 12/9/2021 to 2/17/22 extend to  3/18/22.     Outpatient Physical Therapy 2 times weekly for 5 weeks (effective week of 2/15/22) to include the following interventions: Cervical/Lumbar Traction, Electrical Stimulation IFC, NMES, Gait Training, Manual Therapy, Moist Heat/ Ice, Neuromuscular Re-ed, Patient Education, Self Care, Therapeutic Activites, Therapeutic Exercise and DN.      Continue Plan of Care (POC) and progress per patient tolerance.    Azeb Lancaster, PT

## 2022-02-17 ENCOUNTER — CLINICAL SUPPORT (OUTPATIENT)
Dept: REHABILITATION | Facility: HOSPITAL | Age: 72
End: 2022-02-17
Payer: MEDICARE

## 2022-02-17 DIAGNOSIS — M25.511 ACUTE PAIN OF RIGHT SHOULDER: ICD-10-CM

## 2022-02-17 DIAGNOSIS — M25.611 DECREASED ROM OF RIGHT SHOULDER: ICD-10-CM

## 2022-02-17 DIAGNOSIS — R29.898 DECREASED ROM OF NECK: ICD-10-CM

## 2022-02-17 PROCEDURE — 97140 MANUAL THERAPY 1/> REGIONS: CPT | Mod: HCNC

## 2022-02-17 PROCEDURE — 97110 THERAPEUTIC EXERCISES: CPT | Mod: HCNC

## 2022-02-17 PROCEDURE — 97032 APPL MODALITY 1+ESTIM EA 15: CPT | Mod: HCNC

## 2022-02-17 NOTE — PROGRESS NOTES
Physical Therapist  Daily Treatment Note     Name: Geremias Quijano  Clinic Number: [id number]    Therapy Diagnosis:   Encounter Diagnoses   Name Primary?    Decreased ROM of right shoulder     Acute pain of right shoulder     Decreased ROM of neck      Physician: Martínez White MD    Visit Date: 2/17/2022   Physician Orders: PT to eval and treat  Medical Diagnosis from Referral: M67.911 (ICD-10-CM) - Rotator cuff disorder, right  Evaluation Date: 12/9/2021  Authorization Period Expiration: 6/16/22  Plan of Care Expiration: 2/17/22 extended today to 3/18/22  Visit # / Visits authorized: 13/20; (5 in 2021)  Progress note due: 20th visit or 30 days from 2/15/22    Foto: 2/3     PTA VISIT 0/5     Precautions: Standard, HTN, mild Loss of hearing     Time In: 7:45 am  Time Out: 9:05 am  Total Time: 80 minutes    SUBJECTIVE     Today, pt reports: he is able to turn his head for driving better  He was compliant with home exercise program.  Response to previous treatment: sore  Functional change increased range of motion with activities    Pain: 0/10 at rest  Location: anterior R shoulder/pectoral region      OBJECTIVE        Cervical Spine AROM at eval; today    Degrees Pain   Flexion 55;55 n   Extend 25;55 n   R side bend 25;35 n   L side bend 25;35 n   R rotation 70;80 n   L rotation 65;80 n        TREATMENT       Geremias received therapeutic exercises to develop strength, endurance, ROM, flexibility, posture and core stabilization for 30 minutes including:    Standing UBE for mobility BW 5 min  Supine cervical retraction 3x 10  Supine cervical retraction with chin tuck 2x 10  Trunk flexion 1x 5  Standing trunk flexion with back to wall 2x 5  Standing on 1/2 foam 2x 5  Upper trap stretch at wall 3x 30 sec hold per side      Deferred:  PT to have pt work on shoulder ER at home  Seated R shoulder scapular retraction with humeral posterior AROM drive while performing shoulder internal rotation (from 50 degrees abduction  "and 65 degrees) 3x 10 each  Prone T 2x 10 B  Prone Y at 110 degrees 2x 10  Supine isometric horizontal abduction GTB 2x10  MedX trunk rotation 20# 2x10 ea  Wall slides w/RTB x10  T spine extension over foam roll 3 levels x5 ea  Wall push up plus 2x 10   Zak curls 20# 2x 10 then 5x 5 sec on power step w/diaphragmatic breathing   Side lying shoulder abduction 3x10  Supine ER at 45 degrees of abduction 3# w/eccentric return 3x10  Pulley crossed W 20# per arm x10; 30# per arm x10  Overhead press SA 10# KB 3 x 10  Standing GTB isometric 10" hold w/arm at 90/ 90 x5  Standing YTI red resistive cords 2x10      Geremias received the following manual therapy techniques x 40 (not including time for 3 min to palpate for DN and 10 min estim) min to include:  STM of the suboccipitals, upper traps, levator scapula and splenius capitus and erectors of spine  PT palpated to determine DN placement in the cervical multifidus from C2-C6 B with estim for 10 min then PT manually estims the infraspinatus, lower trap, levator scapula and upper trap (all on R only) while the estim was running on the neck..     Pt used NMES to the muscle noted above in manual therapy for 10 min      Home Exercises Provided and Patient Education Provided     Education/Self-Care provided: (during therex)   Patient educated on the importance of improved core and upper and lower extremity strength in order to improve alignment of the spine and upper and lower extremities with static positions and dynamic movement.    Patient educated on the importance of strong core and lower extremity musculature in order to improve both static and dynamic balance, improve gait mechanics, reduce fall risk and improve household and community mobility.     Written Home Exercises Provided: Patient instructed to cont prior HEP.  Exercises were reviewed and Geremias was able to demonstrate them prior to the end of the session.  Geremias demonstrated good  understanding of the education " provided.     See EMR under Patient Instructions for exercises provided prior visit.    ASSESSMENT   Geremias Is progressing well towards his goals as neck mobility made great gains today with dry needling but he still has pect tightness and issues to address to improve scapular upward and abduction/adduction rotation for tasks such as abduction and internal rotation. Pt's humeral head is still anterior a bit more than the L and it falls into impinging postures,so PT to continue working on strengthening the lower traps and stretrching the tight pect and bicep. PT to try more Soft tissue work as needed to increase mobility but he is driving with less difficulty.      Pt prognosis is Good.     Pt will continue to benefit from skilled outpatient physical therapy to address the deficits listed in the problem list box on initial evaluation, provide pt/family education and to maximize pt's level of independence in the home and community environment.     Pt's spiritual, cultural and educational needs considered and pt agreeable to plan of care and goals.     Anticipated barriers to physical therapy: pt has a busy work schedule, multiple joints involved    Goals:   Short Term Goals: In 4 weeks   1.Pt to be educated on HEP. met  2.Patient to increase GH ROM to 165 or better. Nearly met at 160 today  3.Patient to have no pain with shoulder internal rotation at 90 degrees. met  4.Patient to have pain less than 4/10 with lifting at the gym. met  5.Patient to improve score on the FOTO by 10%. progressing  6. Pt to be educated on postural and body mechanics awareness. Met  STG update to be met by 3/04/22  1. Pt shoulder flexion to 165 degrees on the R  2. Cervical extension to 45 degrees or better  3. Cervical side flexion to 40 degrees or better     Long Term Goals: In 10 weeks 2/17/22  1. Patient to improve score on the FOTO to 27% limitation or better. To be determined  2. Patient to demo increase in UE strength to 4/5 with the  lower traps. progressing  3. Patient to demonstrate increased cervical extension to 60 degrees or better progressing  4. Patient to demo shoulder abduction up to 3# without pain. progressing  5. Patient to perform daily activities including single arm lifting overhead up to 10 # or more without limitation to simulate some home maintenance tasks. Progressing  LTG update to be met in 5 weeks by 3/18/22  1. Lower trap strength at 4/5  2.overhead press up to 10# without pain  3. Shoulder internal rotation ROM to increase to reaching T8 (today at L2 but able to reach to the L side of the back by end of session)       PLAN     Plan of care Certification: 12/9/2021 to 2/17/22 extend to 3/18/22.     Outpatient Physical Therapy 2 times weekly for 5 weeks (effective week of 2/15/22) to include the following interventions: Cervical/Lumbar Traction, Electrical Stimulation IFC, NMES, Gait Training, Manual Therapy, Moist Heat/ Ice, Neuromuscular Re-ed, Patient Education, Self Care, Therapeutic Activites, Therapeutic Exercise and DN.      Continue Plan of Care (POC) and progress per patient tolerance.    Azeb Lancaster, PT

## 2022-02-22 ENCOUNTER — CLINICAL SUPPORT (OUTPATIENT)
Dept: REHABILITATION | Facility: HOSPITAL | Age: 72
End: 2022-02-22
Payer: MEDICARE

## 2022-02-22 DIAGNOSIS — G89.29 CHRONIC RIGHT SHOULDER PAIN: ICD-10-CM

## 2022-02-22 DIAGNOSIS — M25.511 CHRONIC RIGHT SHOULDER PAIN: ICD-10-CM

## 2022-02-22 DIAGNOSIS — M25.611 DECREASED ROM OF RIGHT SHOULDER: Primary | ICD-10-CM

## 2022-02-22 DIAGNOSIS — R29.898 DECREASED ROM OF NECK: ICD-10-CM

## 2022-02-22 PROCEDURE — 97140 MANUAL THERAPY 1/> REGIONS: CPT | Mod: HCNC,CQ

## 2022-02-22 PROCEDURE — 97110 THERAPEUTIC EXERCISES: CPT | Mod: HCNC,CQ

## 2022-02-22 NOTE — PROGRESS NOTES
Physical Therapist Assistant Daily Treatment Note     Name: Geremias Quijano  Clinic Number: [id number]    Therapy Diagnosis:   Encounter Diagnoses   Name Primary?    Decreased ROM of right shoulder Yes    Chronic right shoulder pain     Decreased ROM of neck      Physician: Martínez White MD    Visit Date: 2/22/2022   Physician Orders: PT to eval and treat  Medical Diagnosis from Referral: M67.911 (ICD-10-CM) - Rotator cuff disorder, right  Evaluation Date: 12/9/2021  Authorization Period Expiration: 6/16/22  Plan of Care Expiration: 3/18/22  Visit # / Visits authorized: 14/20; (5 in 2021)  Progress note due: 20th visit or 30 days from 2/15/22    Foto: 2/3     PTA VISIT 1/5     Precautions: Standard, HTN, mild Loss of hearing     Time In: 7:40 am  Time Out: 8:34  Total Time: 53 minutes    SUBJECTIVE     Today, pt reports: feeling like his range of motion has improved in his neck and shoulder  He was compliant with home exercise program.  Response to previous treatment: sore  Functional change increased range of motion with activities    Pain: 0/10 at rest  Location: anterior R shoulder/pectoral region      OBJECTIVE   2/17/22     Cervical Spine AROM at eval; today    Degrees Pain   Flexion 55;55 n   Extend 25;55 n   R side bend 25;35 n   L side bend 25;35 n   R rotation 70;80 n   L rotation 65;80 n        TREATMENT       Geremias received therapeutic exercises to develop strength, endurance, ROM, flexibility, posture and core stabilization for 35 minutes including:    Standing UBE for mobility BW 5 min  Supine R serratus punch 10# BUKB 2x10  Supine cervical retraction with chin tuck 2x 10  T spine extension over foam roll 2 levels x 8 ea  Seated trunk rotation x8 ea  Standing trunk flexion with back to wall 2x 5  Standing on 1/2 foam 2x 5 added in OH arms  Standing crossed Y's from chest @ pulley 10# x10  OH farmers walk 10# BUKB R / counterbalance low 15# KB in L hand      Deferred:   Seated R shoulder scapular  "retraction with humeral posterior AROM drive while performing shoulder internal rotation (from 50 degrees abduction and 65 degrees) 3x 10 each  Prone T 2x 10 B  Prone Y at 110 degrees 2x 10  Supine isometric horizontal abduction GTB 2x10  MedX trunk rotation 20# 2x10 ea  Wall slides w/RTB x10  Wall push up plus 2x 10   Zak curls 20# 2x 10 then 5x 5 sec on power step w/diaphragmatic breathing   Side lying shoulder abduction 3x10  Supine ER at 45 degrees of abduction 3# w/eccentric return 3x10  Pulley crossed W 20# per arm x10; 30# per arm x10  Overhead press SA 10# KB 3 x 10  Standing GTB isometric 10" hold w/arm at 90/ 90 x5  Standing YTI red resistive cords 2x10      Geremias received the following manual therapy techniques x 18 to include:   STM to cervical paraspinals  METs to B traps        Home Exercises Provided and Patient Education Provided     Education/Self-Care provided: (during therex)   Patient educated on the importance of improved core and upper and lower extremity strength in order to improve alignment of the spine and upper and lower extremities with static positions and dynamic movement.    Patient educated on the importance of strong core and lower extremity musculature in order to improve both static and dynamic balance, improve gait mechanics, reduce fall risk and improve household and community mobility.     Written Home Exercises Provided: Patient instructed to cont prior HEP.  Exercises were reviewed and Geremias was able to demonstrate them prior to the end of the session.  Geremias demonstrated good  understanding of the education provided.     See EMR under Patient Instructions for exercises provided prior visit.    ASSESSMENT   Geremias Is progressing well towards his goals  Geremias presented to therapy today with minimal complaints and reporting improved range of motion in his neck and shoulder. Pt's right humeral head is still anterior a bit more than the L and it falls into impinging postures,so " "we continued working on strengthening and stretching. Also continued with spinal range of motion as this continues to be limited as well impacting his range and discomfort. Geremias's cervical alignment was improved following manual. His right pects continue to have significant tension however we did get some improvement with manual releases while in a "chicken wing" position.   Pt prognosis is Good.     Pt will continue to benefit from skilled outpatient physical therapy to address the deficits listed in the problem list box on initial evaluation, provide pt/family education and to maximize pt's level of independence in the home and community environment.     Pt's spiritual, cultural and educational needs considered and pt agreeable to plan of care and goals.     Anticipated barriers to physical therapy: pt has a busy work schedule, multiple joints involved    Goals:   Short Term Goals: In 4 weeks   1.Pt to be educated on HEP. met  2.Patient to increase GH ROM to 165 or better. Nearly met at 160 today  3.Patient to have no pain with shoulder internal rotation at 90 degrees. met  4.Patient to have pain less than 4/10 with lifting at the gym. met  5.Patient to improve score on the FOTO by 10%. progressing  6. Pt to be educated on postural and body mechanics awareness. Met  STG update to be met by 3/04/22  1. Pt shoulder flexion to 165 degrees on the R  2. Cervical extension to 45 degrees or better  3. Cervical side flexion to 40 degrees or better     Long Term Goals: In 10 weeks 2/17/22  1. Patient to improve score on the FOTO to 27% limitation or better. To be determined  2. Patient to demo increase in UE strength to 4/5 with the lower traps. progressing  3. Patient to demonstrate increased cervical extension to 60 degrees or better progressing  4. Patient to demo shoulder abduction up to 3# without pain. progressing  5. Patient to perform daily activities including single arm lifting overhead up to 10 # or more without " limitation to simulate some home maintenance tasks. Progressing  LTG update to be met in 5 weeks by 3/18/22  1. Lower trap strength at 4/5  2.overhead press up to 10# without pain  3. Shoulder internal rotation ROM to increase to reaching T8 (today at L2 but able to reach to the L side of the back by end of session)       PLAN     Plan of care Certification: 12/9/2021 to 2/17/22 extend to 3/18/22.     Outpatient Physical Therapy 2 times weekly for 5 weeks (effective week of 2/15/22) to include the following interventions: Cervical/Lumbar Traction, Electrical Stimulation IFC, NMES, Gait Training, Manual Therapy, Moist Heat/ Ice, Neuromuscular Re-ed, Patient Education, Self Care, Therapeutic Activites, Therapeutic Exercise and DN.      Continue Plan of Care (POC) and progress per patient tolerance.    Eun Myrick, PTA

## 2022-02-24 ENCOUNTER — CLINICAL SUPPORT (OUTPATIENT)
Dept: REHABILITATION | Facility: HOSPITAL | Age: 72
End: 2022-02-24
Payer: MEDICARE

## 2022-02-24 DIAGNOSIS — M25.611 DECREASED ROM OF RIGHT SHOULDER: Primary | ICD-10-CM

## 2022-02-24 DIAGNOSIS — M25.511 ACUTE PAIN OF RIGHT SHOULDER: ICD-10-CM

## 2022-02-24 DIAGNOSIS — R29.898 DECREASED ROM OF NECK: ICD-10-CM

## 2022-02-24 PROCEDURE — 97032 APPL MODALITY 1+ESTIM EA 15: CPT | Mod: HCNC

## 2022-02-24 PROCEDURE — 97110 THERAPEUTIC EXERCISES: CPT | Mod: HCNC

## 2022-02-24 PROCEDURE — 97140 MANUAL THERAPY 1/> REGIONS: CPT | Mod: HCNC

## 2022-02-24 NOTE — PROGRESS NOTES
Physical Therapist Daily Treatment Note     Name: Geremias Quijano  Clinic Number: [id number]    Therapy Diagnosis:   Encounter Diagnoses   Name Primary?    Decreased ROM of right shoulder Yes    Acute pain of right shoulder     Decreased ROM of neck      Physician: Martínez White MD    Visit Date: 2/24/2022   Physician Orders: PT to eval and treat  Medical Diagnosis from Referral: M67.911 (ICD-10-CM) - Rotator cuff disorder, right  Evaluation Date: 12/9/2021  Authorization Period Expiration: 6/16/22  Plan of Care Expiration: 3/18/22  Visit # / Visits authorized: 15/20; (5 in 2021)  Progress note due: 20th visit or 30 days from 2/15/22    Foto: 2/3     PTA VISIT 0/5     Precautions: Standard, HTN, mild Loss of hearing     Time In: 7:46 am  Time Out: 8:50  Total Time: 54 minutes    SUBJECTIVE     Today, pt reports: he feels the stiffness in his neck when turning to the L but had no idea how stiff it was until he started getting better motion overall.    He was compliant with home exercise program.  Response to previous treatment: sore  Functional change increased range of motion with activities    Pain: 0/10 at rest pain free up to 110 degrees of abduction with internal rotation today at start showing good carry over since last session  Location: anterior R shoulder/pectoral region        TREATMENT       Geremias received therapeutic exercises to develop strength, endurance, ROM, flexibility, posture and core stabilization for 15 minutes including:    Standing UBE for mobility Backwards 4 min  After manual therapy and DN  Scapular reaching for upward rotation 3x 10 R arm 1x 10 L  Scapular reaching for upward rotation with scoop and flex 2x 10   Cervical retraction with chin tuck 2x 5  Standing on 1/2 foam posterior chain activation 2x 10 with focus on heel striking down to stabilize the pelvis for a lumbar ROM stretch (noted on L side only)    Deferred:  Supine R serratus punch 10# BUKB 2x10  Supine cervical  "retraction with chin tuck 2x 10  T spine extension over foam roll 2 levels x 8 ea  Seated trunk rotation x8 ea  Standing trunk flexion with back to wall 2x 5  Standing on 1/2 foam 2x 5 added in OH arms  Standing crossed Y's from chest @ pulley 10# x10  OH farmers walk 10# BUKB R / counterbalance low 15# KB in L hand  Seated R shoulder scapular retraction with humeral posterior AROM drive while performing shoulder internal rotation (from 50 degrees abduction and 65 degrees) 3x 10 each  Wall slides w/RTB x10  Wall push up plus 2x 10   Zak curls 20# 2x 10 then 5x 5 sec on power step w/diaphragmatic breathing   Side lying shoulder abduction 3x10  Supine ER at 45 degrees of abduction 3# w/eccentric return 3x10  Pulley crossed W 20# per arm x10; 30# per arm x10  Overhead press SA 10# KB 3 x 10  Standing GTB isometric 10" hold w/arm at 90/ 90 x5  Standing YTI red resistive cords 2x10      Geremias received the following manual therapy techniques x 30 to include:   STM to cervical paraspinals, levators, upper traps, rhomboids and lat and subscapularis with scapular mobs to increase upward rotation, abduction and posterior tipping. PT used cervical mobs grade II to increase upper C flexion and rotation to the L.   PT used palpation to determine DN placement in the cervical spine 4 levels from L2 down and rhomboids and levators B and L lower trap.    Pt received NMES manual electrical stimulation to the B cervical levels noted,  levators, rhomboids and lower traps for 9 minutes    Home Exercises Provided and Patient Education Provided     Education/Self-Care provided: (during therex)   Patient educated on the importance of improved core and upper and lower extremity strength in order to improve alignment of the spine and upper and lower extremities with static positions and dynamic movement.    Patient educated on the importance of strong core and lower extremity musculature in order to improve both static and dynamic " balance, improve gait mechanics, reduce fall risk and improve household and community mobility.     Written Home Exercises Provided: Patient instructed to cont prior HEP.  Exercises were reviewed and Geremias was able to demonstrate them prior to the end of the session.  Geremias demonstrated good  understanding of the education provided.     See EMR under Patient Instructions for exercises provided prior visit.    ASSESSMENT   Geremias Is progressing well towards his goals. Pt's upper C spine is still stiff L>R now limiting L rotation mildly but pt had good carry over in painless shoulder abduction with internal rotation tasks. Pt needed a lot of manual cues to increase his awareness on how to upward rotate in the scapula without shoulder hiking or using the pect minor. Pt tends to use the pect minor or deltoid to gain shoulder function on the R so manual cues to direct scooping into a posterior tilt and upward rotation provided good visual feed back. PT to continue working on progressing shoulder upward rotation without levator or UT over use and continue with tasks to free up T spine and lumbar motion as well as remaining cervical deficits.    Pt prognosis is Good.     Pt will continue to benefit from skilled outpatient physical therapy to address the deficits listed in the problem list box on initial evaluation, provide pt/family education and to maximize pt's level of independence in the home and community environment.     Pt's spiritual, cultural and educational needs considered and pt agreeable to plan of care and goals.     Anticipated barriers to physical therapy: pt has a busy work schedule, multiple joints involved    Goals:   Short Term Goals: In 4 weeks   1.Pt to be educated on HEP. met  2.Patient to increase GH ROM to 165 or better. met  3.Patient to have no pain with shoulder internal rotation at 90 degrees. met  4.Patient to have pain less than 4/10 with lifting at the gym. met  5.Patient to improve score on the  FOTO by 10%. progressing  6. Pt to be educated on postural and body mechanics awareness. Met  STG update to be met by 3/04/22  1. Pt shoulder flexion to 165 degrees on the R. met  2. Cervical extension to 45 degrees or better. met  3. Cervical side flexion to 40 degrees or better. progressing     Long Term Goals: In 10 weeks 2/17/22  1. Patient to improve score on the FOTO to 27% limitation or better. met  2. Patient to demo increase in UE strength to 4/5 with the lower traps. progressing  3. Patient to demonstrate increased cervical extension to 60 degrees or better. progressing  4. Patient to demo shoulder abduction up to 3# without pain. progressing  5. Patient to perform daily activities including single arm lifting overhead up to 10 # or more without limitation to simulate some home maintenance tasks. Progressing  LTG update to be met in 5 weeks by 3/18/22  1. Lower trap strength at 4/5. progressing  2.overhead press up to 10# without pain progressing  3. Shoulder internal rotation ROM to increase to reaching T8 (today at T10 by end of session)       PLAN     Plan of care Certification: 12/9/2021 to 2/17/22 extend to 3/18/22.     Outpatient Physical Therapy 2 times weekly for 5 weeks (effective week of 2/15/22) to include the following interventions: Cervical/Lumbar Traction, Electrical Stimulation IFC, NMES, Gait Training, Manual Therapy, Moist Heat/ Ice, Neuromuscular Re-ed, Patient Education, Self Care, Therapeutic Activites, Therapeutic Exercise and DN.      Continue Plan of Care (POC) and progress per patient tolerance.    Azeb Lancaster, PT

## 2022-03-01 ENCOUNTER — CLINICAL SUPPORT (OUTPATIENT)
Dept: REHABILITATION | Facility: HOSPITAL | Age: 72
End: 2022-03-01
Payer: MEDICARE

## 2022-03-01 DIAGNOSIS — R29.898 DECREASED ROM OF NECK: ICD-10-CM

## 2022-03-01 DIAGNOSIS — M25.611 DECREASED ROM OF RIGHT SHOULDER: Primary | ICD-10-CM

## 2022-03-01 DIAGNOSIS — M25.511 ACUTE PAIN OF RIGHT SHOULDER: ICD-10-CM

## 2022-03-01 PROCEDURE — 97110 THERAPEUTIC EXERCISES: CPT | Mod: HCNC

## 2022-03-01 PROCEDURE — 97140 MANUAL THERAPY 1/> REGIONS: CPT | Mod: HCNC

## 2022-03-01 NOTE — PROGRESS NOTES
Physical Therapist Daily Treatment Note     Name: Geremias Quijano  Clinic Number: [id number]    Therapy Diagnosis:   Encounter Diagnoses   Name Primary?    Decreased ROM of right shoulder Yes    Acute pain of right shoulder     Decreased ROM of neck      Physician: Martínez White MD    Visit Date: 3/1/2022   Physician Orders: PT to eval and treat  Medical Diagnosis from Referral: M67.911 (ICD-10-CM) - Rotator cuff disorder, right  Evaluation Date: 12/9/2021  Authorization Period Expiration: 6/16/22  Plan of Care Expiration: 3/18/22  Visit # / Visits authorized: 16/20; (5 in 2021)  Progress note due: 20th visit or 30 days from 2/15/22    Foto: 2/3     PTA VISIT 0/5     Precautions: Standard, HTN, mild Loss of hearing     Time In: 7:45 am  Time Out: 8:30  Total Time: 45 minutes    SUBJECTIVE     Today, pt reports:  he feels needling helps the neck motion a lot.    He was compliant with home exercise program.  Response to previous treatment: sore  Functional change increased range of motion with activities    Pain: 0/10 at rest pain free up to 110 degrees of abduction with internal rotation today at start showing good carry over since last session  Location: anterior R shoulder/pectoral region        TREATMENT       Geremias received therapeutic exercises to develop strength, endurance, ROM, flexibility, posture and core stabilization for 15 minutes including:    After manual therapy  Prone R scapular upward rotation 2x 10  Seated scapular upward glides 2x 10 with manual feed back and cues to reduce UT  Overhead press up with green theraband resisting external rotation 2x 10   Polly cross lower at pulley's 20 # for extension 2x 10  Polly cross mid ROM for W 20# pulleys for posterior cuff 2x 10  Polly cross high Y with 10# pulley's 2x 10  Polly cross with pulleys low performing Y 10# difficult so removed weight and used mirror for feed back    Deferred:  Standing UBE for mobility Backwards 4 min  After manual therapy  "and DN  Scapular reaching for upward rotation 3x 10 R arm 1x 10 L  Scapular reaching for upward rotation with scoop and flex 2x 10   OH farmers walk 10# BUKB R / counterbalance low 15# KB in L hand  Seated R shoulder scapular retraction with humeral posterior AROM drive while performing shoulder internal rotation (from 50 degrees abduction and 65 degrees) 3x 10 each  Wall push up plus 2x 10   Zak curls 20# 2x 10 then 5x 5 sec on power step w/diaphragmatic breathing   Side lying shoulder abduction 3x10  Supine ER at 45 degrees of abduction 3# w/eccentric return 3x10  Standing GTB isometric 10" hold w/arm at 90/ 90 x5  Standing YTI red resistive cords 2x10      Geremias received the following manual therapy techniques x 30 to include:   STM to cervical suboccipitals, paraspinals, levators, upper traps, rhomboid, lat and subscapularis with scapular mobs to increase upward rotation. PT used cervical mobs grade II-III to increase upper C flexion, extension and side glides. Grade II mobs to the upper C spine. PT manually stretched his Upper traps and levators.       Pt received NMES manual electrical stimulation to the B cervical levels noted,  levators, rhomboids and lower traps for 0 minutes    Home Exercises Provided and Patient Education Provided     Education/Self-Care provided: (during therex)   Patient educated on the importance of improved core and upper and lower extremity strength in order to improve alignment of the spine and upper and lower extremities with static positions and dynamic movement.    Patient educated on the importance of strong core and lower extremity musculature in order to improve both static and dynamic balance, improve gait mechanics, reduce fall risk and improve household and community mobility.     Written Home Exercises Provided: Patient instructed to cont prior HEP.  Exercises were reviewed and Geremias was able to demonstrate them prior to the end of the session.  Geremias demonstrated " good  understanding of the education provided.     See EMR under Patient Instructions for exercises provided prior visit.    ASSESSMENT   Geremias Is progressing well towards his goals but tends to use his Upper trap and chest internal rotation when working on D2 patterns on the R UE at the pulley's so we had to stop the pulley portion and just work in the mirror for visual feed back to encourage upward rotation. PT to continue working on progressing shoulder upward rotation without levator or UT over use and continue with tasks to free up T spine and lumbar motion as well as remaining cervical deficits.    Pt prognosis is Good.     Pt will continue to benefit from skilled outpatient physical therapy to address the deficits listed in the problem list box on initial evaluation, provide pt/family education and to maximize pt's level of independence in the home and community environment.     Pt's spiritual, cultural and educational needs considered and pt agreeable to plan of care and goals.     Anticipated barriers to physical therapy: pt has a busy work schedule, multiple joints involved    Goals:   Short Term Goals: In 4 weeks   1.Pt to be educated on HEP. met  2.Patient to increase GH ROM to 165 or better. met  3.Patient to have no pain with shoulder internal rotation at 90 degrees. met  4.Patient to have pain less than 4/10 with lifting at the gym. met  5.Patient to improve score on the FOTO by 10%. progressing  6. Pt to be educated on postural and body mechanics awareness. Met  STG update to be met by 3/04/22  1. Pt shoulder flexion to 165 degrees on the R. met  2. Cervical extension to 45 degrees or better. met  3. Cervical side flexion to 40 degrees or better. progressing     Long Term Goals: In 10 weeks 2/17/22  1. Patient to improve score on the FOTO to 27% limitation or better. met  2. Patient to demo increase in UE strength to 4/5 with the lower traps. progressing  3. Patient to demonstrate increased cervical  extension to 60 degrees or better. progressing  4. Patient to demo shoulder abduction up to 3# without pain. progressing  5. Patient to perform daily activities including single arm lifting overhead up to 10 # or more without limitation to simulate some home maintenance tasks. Progressing  LTG update to be met in 5 weeks by 3/18/22  1. Lower trap strength at 4/5. progressing  2.overhead press up to 10# without pain progressing  3. Shoulder internal rotation ROM to increase to reaching T8 (today at T10 by end of session)       PLAN     Plan of care Certification: 12/9/2021 to 2/17/22 extend to 3/18/22.     Outpatient Physical Therapy 2 times weekly for 5 weeks (effective week of 2/15/22) to include the following interventions: Cervical/Lumbar Traction, Electrical Stimulation IFC, NMES, Gait Training, Manual Therapy, Moist Heat/ Ice, Neuromuscular Re-ed, Patient Education, Self Care, Therapeutic Activites, Therapeutic Exercise and DN.      Continue Plan of Care (POC) and progress per patient tolerance.    Azeb Lancaster, PT

## 2022-03-03 ENCOUNTER — CLINICAL SUPPORT (OUTPATIENT)
Dept: REHABILITATION | Facility: HOSPITAL | Age: 72
End: 2022-03-03
Payer: MEDICARE

## 2022-03-03 DIAGNOSIS — M25.611 DECREASED ROM OF RIGHT SHOULDER: Primary | ICD-10-CM

## 2022-03-03 DIAGNOSIS — M25.511 ACUTE PAIN OF RIGHT SHOULDER: ICD-10-CM

## 2022-03-03 DIAGNOSIS — R29.898 DECREASED ROM OF NECK: ICD-10-CM

## 2022-03-03 PROCEDURE — 97140 MANUAL THERAPY 1/> REGIONS: CPT | Mod: HCNC

## 2022-03-03 PROCEDURE — 97110 THERAPEUTIC EXERCISES: CPT | Mod: HCNC

## 2022-03-03 PROCEDURE — 97014 ELECTRIC STIMULATION THERAPY: CPT | Mod: HCNC

## 2022-03-03 NOTE — PROGRESS NOTES
Physical Therapist Daily Treatment Note     Name: Geremias Quijano  Clinic Number: [id number]    Therapy Diagnosis:   Encounter Diagnoses   Name Primary?    Decreased ROM of right shoulder Yes    Acute pain of right shoulder     Decreased ROM of neck      Physician: Martínez White MD    Visit Date: 3/3/2022   Physician Orders: PT to eval and treat  Medical Diagnosis from Referral: M67.911 (ICD-10-CM) - Rotator cuff disorder, right  Evaluation Date: 12/9/2021  Authorization Period Expiration: 6/16/22  Plan of Care Expiration: 3/18/22  Visit # / Visits authorized: 17/20; (5 in 2021)  Progress note due: 20th visit or 30 days from 2/15/22    Foto: 2/3     PTA VISIT 0/5     Precautions: Standard, HTN, mild Loss of hearing     Time In: 7:45 am  Time Out: 8:39  Total Time: 54 minutes    SUBJECTIVE     Today, pt reports:  he feels was really sore at the top of the shoulder last time but he is much more supple and ROM in the neck is improving.    He was compliant with home exercise program.  Response to previous treatment: sore  Functional change increased range of motion with activities    Pain: 0/10 at rest pain free up to 100 degrees of abduction with internal rotation today at start showing good carry over since last session  Location: anterior R shoulder/pectoral region        TREATMENT   CROM rotation L 60 on arrival today R 80 rotation    Geremias received therapeutic exercises to develop strength, endurance, ROM, flexibility, posture and core stabilization for 24 minutes including:    After manual therapy and Dry needling:  Supine cervical retraction with chin tuck 2x 10 with AAROM to aid in chin tuck to reduce protraction  Supine cervical retracion/flexion with rotation 2x 5  Seated shoulder external rotation with blue theratube 3x 10 at 45 an 60 and 90 degrees each task  Seated scapular protraction/retraction with blue therabatube 2x 10 with frequent cues to reduce shoulder hiking (Upper trap relax cues manually  and verbally)  D2 with cues on scapular scooping and external rotation 1x 10      Deferred:  OH farmers walk 10# BUKB R / counterbalance low 15# KB in L hand  Seated R shoulder scapular retraction with humeral posterior AROM drive while performing shoulder internal rotation (from 50 degrees abduction and 65 degrees) 3x 10 each  Wall push up plus 2x 10   Zak curls 20# 2x 10 then 5x 5 sec on power step w/diaphragmatic breathing   Side lying shoulder abduction 3x10  Standing YTI red resistive cords 2x10      Geremias received the following manual therapy techniques x 20 to include: soft tissue mobilization to the rhomboid deltoid and bicep as well as the infraspinatus followed by soft tissue palpation ( 2 min) to determin DN placement followed by estim. PT used DN in the lateral and posterior deltoid, infraspinatus and supraspinatus for 10 min with NMES  unit at frequency 2 on low. PT followed this with upper trap and levator and rhomboid stretching manually prior to therex. PT also used AP mobs to increase upper cervical ROM with retraction and mild manual traction following DN/Estim.      Pt received NMES electrical stimulation to the muscle noted in palpation above for 10 minutes (noted in manual therapy section)    Home Exercises Provided and Patient Education Provided     Education/Self-Care provided: (during therex)   Patient educated on the importance of improved core and upper and lower extremity strength in order to improve alignment of the spine and upper and lower extremities with static positions and dynamic movement.    Patient educated on the importance of strong core and lower extremity musculature in order to improve both static and dynamic balance, improve gait mechanics, reduce fall risk and improve household and community mobility.     Written Home Exercises Provided: Patient instructed to cont prior HEP.  Exercises were reviewed and Geremias was able to demonstrate them prior to the end of the  session.  Geremias demonstrated good  understanding of the education provided.     See EMR under Patient Instructions for exercises provided prior visit.    ASSESSMENT   Geremias Is progressing well towards his goals but tends to use his Upper trap mildly still. Pt was able to keep the humeral head closer to neutral when standing at rest and it is rotated more upright rathern than downward showing good gains in humeral control from the rotator cuff but PT added in DN to strengthen the cuff more. PT followed with working on external rotation strength eccentrically and concentrically with focus on reducing anterior shifting in the fossa. L cervical rotation lags at 60 degrees vs 80 to the R but his trigger points were much softer today showing great carry over. Upper C spine is still a bit stiff limiting rotation as well as the levator/scalenes and upper trap on the R.  PT to continue working on progressing shoulder upward rotation without levator or UT over use and continue with tasks to free up T spine and lumbar motion as well as remaining cervical deficits.    Pt prognosis is Good.     Pt will continue to benefit from skilled outpatient physical therapy to address the deficits listed in the problem list box on initial evaluation, provide pt/family education and to maximize pt's level of independence in the home and community environment.     Pt's spiritual, cultural and educational needs considered and pt agreeable to plan of care and goals.     Anticipated barriers to physical therapy: pt has a busy work schedule, multiple joints involved    Goals:   Short Term Goals: In 4 weeks   1.Pt to be educated on HEP. met  2.Patient to increase GH ROM to 165 or better. met  3.Patient to have no pain with shoulder internal rotation at 90 degrees. met  4.Patient to have pain less than 4/10 with lifting at the gym. met  5.Patient to improve score on the FOTO by 10%. progressing  6. Pt to be educated on postural and body mechanics  awareness. Met  STG update to be met by 3/04/22  1. Pt shoulder flexion to 165 degrees on the R. met  2. Cervical extension to 45 degrees or better. met  3. Cervical side flexion to 40 degrees or better. progressing     Long Term Goals: In 10 weeks 2/17/22  1. Patient to improve score on the FOTO to 27% limitation or better. met  2. Patient to demo increase in UE strength to 4/5 with the lower traps. progressing  3. Patient to demonstrate increased cervical extension to 60 degrees or better. progressing  4. Patient to demo shoulder abduction up to 3# without pain. progressing  5. Patient to perform daily activities including single arm lifting overhead up to 10 # or more without limitation to simulate some home maintenance tasks. Progressing  LTG update to be met in 5 weeks by 3/18/22  1. Lower trap strength at 4/5. progressing  2.overhead press up to 10# without pain progressing  3. Shoulder internal rotation ROM to increase to reaching T8 (today at T10 by end of session)       PLAN     Plan of care Certification: 12/9/2021 to 2/17/22 extend to 3/18/22.     Outpatient Physical Therapy 2 times weekly for 5 weeks (effective week of 2/15/22) to include the following interventions: Cervical/Lumbar Traction, Electrical Stimulation IFC, NMES, Gait Training, Manual Therapy, Moist Heat/ Ice, Neuromuscular Re-ed, Patient Education, Self Care, Therapeutic Activites, Therapeutic Exercise and DN.      Continue Plan of Care (POC) and progress per patient tolerance.    Azeb Lancaster, PT

## 2022-03-08 ENCOUNTER — DOCUMENTATION ONLY (OUTPATIENT)
Dept: REHABILITATION | Facility: HOSPITAL | Age: 72
End: 2022-03-08

## 2022-03-08 ENCOUNTER — CLINICAL SUPPORT (OUTPATIENT)
Dept: REHABILITATION | Facility: HOSPITAL | Age: 72
End: 2022-03-08
Payer: MEDICARE

## 2022-03-08 DIAGNOSIS — M25.611 DECREASED ROM OF RIGHT SHOULDER: Primary | ICD-10-CM

## 2022-03-08 DIAGNOSIS — G89.29 CHRONIC RIGHT SHOULDER PAIN: ICD-10-CM

## 2022-03-08 DIAGNOSIS — R29.898 DECREASED ROM OF NECK: ICD-10-CM

## 2022-03-08 DIAGNOSIS — M25.511 CHRONIC RIGHT SHOULDER PAIN: ICD-10-CM

## 2022-03-08 PROCEDURE — 97110 THERAPEUTIC EXERCISES: CPT | Mod: HCNC,CQ

## 2022-03-08 PROCEDURE — 97140 MANUAL THERAPY 1/> REGIONS: CPT | Mod: HCNC,CQ

## 2022-03-08 NOTE — PROGRESS NOTES
Physical Therapist Assistant Daily Treatment Note     Name: Geremias Quijano  Clinic Number: [id number]    Therapy Diagnosis:   Encounter Diagnoses   Name Primary?    Decreased ROM of right shoulder Yes    Chronic right shoulder pain     Decreased ROM of neck      Physician: Martínez White MD    Visit Date: 3/8/2022   Physician Orders: PT to eval and treat  Medical Diagnosis from Referral: M67.911 (ICD-10-CM) - Rotator cuff disorder, right  Evaluation Date: 12/9/2021  Authorization Period Expiration: 6/16/22  Plan of Care Expiration: 3/18/22  Visit # / Visits authorized: 18/20; (5 in 2021)  Progress note due: 20th visit or 30 days from 2/15/22    Foto: 2/3     PTA VISIT 1/5     Precautions: Standard, HTN, mild Loss of hearing     Time In: 7:45 am  Time Out: 8:34  Total Time: 49 minutes    SUBJECTIVE     Today, pt reports: no complaints/changes voiced  He was compliant with home exercise program.  Response to previous treatment: sore  Functional change increased range of motion with activities  Pain: 0/10 at rest   Location: anterior R shoulder pain with end range of flexion/abduction        TREATMENT     Geremias received therapeutic exercises to develop strength, endurance, ROM, flexibility, posture and core stabilization for 36 minutes including:    Standing UBE BW 5 min for mobility  Standing R lat stretch  Kneeling at bar for T/S flexion/extension---limited x10  Quadruped R pect stretch w/swiss ball  Standing R ER GTB>lateral walk out>slow eccentric return x10  Standing R bicep curl w/sports cord>slow eccentric return x10  Prone cervical retraction 2x10  Supine wind shield wipers x20  Push up plus on wall 2x10    DEFERRED:  Supine cervical retraction with chin tuck 2x 10 with AAROM to aid in chin tuck to reduce protraction  Supine cervical retracion/flexion with rotation 2x 5  Seated shoulder external rotation with blue theratube 3x 10 at 45 an 60 and 90 degrees each task  Seated scapular protraction/retraction  with blue therabatube 2x 10 with frequent cues to reduce shoulder hiking (Upper trap relax cues manually and verbally)  D2 with cues on scapular scooping and external rotation 1x 10  OH farmers walk 10# BUKB R / counterbalance low 15# KB in L hand  Seated R shoulder scapular retraction with humeral posterior AROM drive while performing shoulder internal rotation (from 50 degrees abduction and 65 degrees) 3x 10 each  Zak curls 20# 2x 10 then 5x 5 sec on power step w/diaphragmatic breathing   Side lying shoulder abduction 3x10  Standing YTI red resistive cords 2x10      Geremias received the following manual therapy techniques x 13 to include: soft tissue mobilization to cervical paraspinals, rhomboids, infraspinatus   Cervical distraction, nodding  Scap mobs w/movements       Pt received NMES electrical stimulation to the muscle noted in palpation above for 0 minutes     Home Exercises Provided and Patient Education Provided     Education/Self-Care provided: (during therex)   Patient educated on the importance of improved core and upper and lower extremity strength in order to improve alignment of the spine and upper and lower extremities with static positions and dynamic movement.    Patient educated on the importance of strong core and lower extremity musculature in order to improve both static and dynamic balance, improve gait mechanics, reduce fall risk and improve household and community mobility.     Written Home Exercises Provided: Patient instructed to cont prior HEP.  Exercises were reviewed and Geremias was able to demonstrate them prior to the end of the session.  Geremias demonstrated good  understanding of the education provided.     See EMR under Patient Instructions for exercises provided prior visit.    ASSESSMENT   Geremias Is progressing well towards his goals  Increased activities today to work on lower, mid and upper spinal mobility for improved support. Also addition of pect stretch with swiss ball in  quadruped. Noted improved alignment/scap mobility with push up plus on the wall; very little upper tap compensation once cued with initial start of the exercise. Patient still has some shoulder pain with end range of flexion and abduction but overall significantly less since start of therapy due to improved humeral control and rotator cuff strength. Geremias demonstrated a positive response to today's session as evidenced with his ongoing activity tolerance/progression without adverse reactions/pain.     Pt prognosis is Good.     Pt will continue to benefit from skilled outpatient physical therapy to address the deficits listed in the problem list box on initial evaluation, provide pt/family education and to maximize pt's level of independence in the home and community environment.     Pt's spiritual, cultural and educational needs considered and pt agreeable to plan of care and goals.     Anticipated barriers to physical therapy: pt has a busy work schedule, multiple joints involved    Goals:   Short Term Goals: In 4 weeks   1.Pt to be educated on HEP. met  2.Patient to increase GH ROM to 165 or better. met  3.Patient to have no pain with shoulder internal rotation at 90 degrees. met  4.Patient to have pain less than 4/10 with lifting at the gym. met  5.Patient to improve score on the FOTO by 10%. progressing  6. Pt to be educated on postural and body mechanics awareness. Met  STG update to be met by 3/04/22  1. Pt shoulder flexion to 165 degrees on the R. met  2. Cervical extension to 45 degrees or better. met  3. Cervical side flexion to 40 degrees or better. progressing     Long Term Goals: In 10 weeks 2/17/22  1. Patient to improve score on the FOTO to 27% limitation or better. met  2. Patient to demo increase in UE strength to 4/5 with the lower traps. progressing  3. Patient to demonstrate increased cervical extension to 60 degrees or better. progressing  4. Patient to demo shoulder abduction up to 3# without  pain. progressing  5. Patient to perform daily activities including single arm lifting overhead up to 10 # or more without limitation to simulate some home maintenance tasks. Progressing  LTG update to be met in 5 weeks by 3/18/22  1. Lower trap strength at 4/5. progressing  2.overhead press up to 10# without pain progressing  3. Shoulder internal rotation ROM to increase to reaching T8 (today at T10 by end of session)       PLAN     Plan of care Certification: 12/9/2021 to 2/17/22 extend to 3/18/22.     Outpatient Physical Therapy 2 times weekly for 5 weeks (effective week of 2/15/22) to include the following interventions: Cervical/Lumbar Traction, Electrical Stimulation IFC, NMES, Gait Training, Manual Therapy, Moist Heat/ Ice, Neuromuscular Re-ed, Patient Education, Self Care, Therapeutic Activites, Therapeutic Exercise and DN.      Continue Plan of Care (POC) and progress per patient tolerance.    Eun Myrick, PTA

## 2022-03-10 ENCOUNTER — CLINICAL SUPPORT (OUTPATIENT)
Dept: REHABILITATION | Facility: HOSPITAL | Age: 72
End: 2022-03-10
Payer: MEDICARE

## 2022-03-10 DIAGNOSIS — M25.511 ACUTE PAIN OF RIGHT SHOULDER: ICD-10-CM

## 2022-03-10 DIAGNOSIS — M25.611 DECREASED ROM OF RIGHT SHOULDER: Primary | ICD-10-CM

## 2022-03-10 DIAGNOSIS — R29.898 DECREASED ROM OF NECK: ICD-10-CM

## 2022-03-10 PROCEDURE — 97110 THERAPEUTIC EXERCISES: CPT | Mod: HCNC

## 2022-03-10 PROCEDURE — 97140 MANUAL THERAPY 1/> REGIONS: CPT | Mod: HCNC

## 2022-03-10 NOTE — PROGRESS NOTES
Physical Therapist Daily Treatment Note     Name: Geremias Quijano  Clinic Number: [id number]    Therapy Diagnosis:   Encounter Diagnoses   Name Primary?    Decreased ROM of right shoulder Yes    Acute pain of right shoulder     Decreased ROM of neck      Physician: Martínez White MD    Visit Date: 3/10/2022   Physician Orders: PT to eval and treat  Medical Diagnosis from Referral: M67.911 (ICD-10-CM) - Rotator cuff disorder, right  Evaluation Date: 12/9/2021  Authorization Period Expiration: 6/16/22  Plan of Care Expiration: 3/18/22  Visit # / Visits authorized: 19/20; (5 in 2021)  Progress note due: 20th visit or 30 days from 2/15/22    Foto: 2/3     PTA VISIT 0/5     Precautions: Standard, HTN, mild Loss of hearing     Time In: 7:45 am  Time Out: 8:30  Total Time: 45 minutes    SUBJECTIVE     Today, pt reports: mild pain with abduction above 130 degrees when shoulder is in internal rotation  He was compliant with home exercise program.  Response to previous treatment: sore  Functional change increased range of motion with activities  Pain: 0/10 at rest   Location: anterior R shoulder pain with end range of flexion/abduction      TREATMENT     Shoulder flexion 160 degrees R and L  Abduction 130 pain begins with shoulder in mild internal rotation but L 145 degrees  Shoulder external rotation at 0 is 65 but at 90 is 90 on R as subscapularis is still mildly tight    Geremias received therapeutic exercises to develop strength, endurance, ROM, flexibility, posture and core stabilization for 15 minutes including:    AAROM for levator and upper trap stretch B 2x 10 with contract relax  R shoulder external rotation  At 60 and 90 degrees of abduction with focus on stable scapula to reduce anterior tipping with eccentric internal/external rotation 4x 15    DEFERRED:  Supine cervical retraction with chin tuck 2x 10 with AAROM to aid in chin tuck to reduce protraction  Supine cervical retracion/flexion with rotation 2x  5  Seated shoulder external rotation with blue theratube 3x 10 at 45 an 60 and 90 degrees each task  Seated scapular protraction/retraction with blue therabatube 2x 10 with frequent cues to reduce shoulder hiking (Upper trap relax cues manually and verbally)  D2 with cues on scapular scooping and external rotation 1x 10  OH farmers walk 10# BUKB R / counterbalance low 15# KB in L hand  Seated R shoulder scapular retraction with humeral posterior AROM drive while performing shoulder internal rotation (from 50 degrees abduction and 65 degrees) 3x 10 each  Zka curls 20# 2x 10 then 5x 5 sec on power step w/diaphragmatic breathing   Side lying shoulder abduction 3x10  Standing YTI red resistive cords 2x10      Geremias received the following manual therapy techniques x 30 to include: soft tissue mobilization to include suboccipital release, upper trap and levator stretch manually, supraspinatus trigger point, lat, serratus anterior trigger points, and infraspinatus.  PT continued with AP mobs to the upper C spine to increase his ability to activate the neck deep flexors to reduce protraction at the head when seated to reduce tension on the upper traps and levators funcionally to reduce inferior glide of the R scapula.         Pt received NMES electrical stimulation to the muscle noted in palpation above for 0 minutes     Home Exercises Provided and Patient Education Provided     Education/Self-Care provided: (during therex)   Patient educated on the importance of improved core and upper and lower extremity strength in order to improve alignment of the spine and upper and lower extremities with static positions and dynamic movement.    Patient educated on the importance of strong core and lower extremity musculature in order to improve both static and dynamic balance, improve gait mechanics, reduce fall risk and improve household and community mobility.     Written Home Exercises Provided: Patient instructed to cont  prior HEP.  Exercises were reviewed and Geremias was able to demonstrate them prior to the end of the session.  Geremias demonstrated good  understanding of the education provided.     See EMR under Patient Instructions for exercises provided prior visit.    ASSESSMENT   Geremias Is progressing well towards his goals. PT focused on freeing up the scapula a little more by reducing levator and upper trap tension as it results in the scapula down rotating. Pt still has mild shoulder abduction pain when the upper trap and deltoid fire vs the supraspinatus clearing the shoulder to allow abduction without impingement, so PT to advance more with a HEP of posterior cuff training for 2 weeks with neck work then follow up with PT to see where progress is.  Geremias demonstrated a positive response to today's session as evidenced with his ongoing activity tolerance/progression without adverse reactions/pain.     Pt prognosis is Good.     Pt will continue to benefit from skilled outpatient physical therapy to address the deficits listed in the problem list box on initial evaluation, provide pt/family education and to maximize pt's level of independence in the home and community environment.     Pt's spiritual, cultural and educational needs considered and pt agreeable to plan of care and goals.     Anticipated barriers to physical therapy: pt has a busy work schedule, multiple joints involved    Goals:   Short Term Goals: In 4 weeks   1.Pt to be educated on HEP. met  2.Patient to increase GH ROM to 165 or better. met  3.Patient to have no pain with shoulder internal rotation at 90 degrees. met  4.Patient to have pain less than 4/10 with lifting at the gym. met  5.Patient to improve score on the FOTO by 10%. progressing  6. Pt to be educated on postural and body mechanics awareness. Met  STG update to be met by 3/04/22  1. Pt shoulder flexion to 165 degrees on the R. met  2. Cervical extension to 45 degrees or better. met  3. Cervical side  flexion to 40 degrees or better. progressing     Long Term Goals: In 10 weeks 2/17/22  1. Patient to improve score on the FOTO to 27% limitation or better. met  2. Patient to demo increase in UE strength to 4/5 with the lower traps. progressing  3. Patient to demonstrate increased cervical extension to 60 degrees or better. progressing  4. Patient to demo shoulder abduction up to 3# without pain. progressing  5. Patient to perform daily activities including single arm lifting overhead up to 10 # or more without limitation to simulate some home maintenance tasks. Progressing  LTG update to be met in 5 weeks by 3/18/22  1. Lower trap strength at 4/5. progressing  2.overhead press up to 10# without pain progressing  3. Shoulder internal rotation ROM to increase to reaching T8 (today at T10 by end of session)       PLAN     Plan of care Certification: 12/9/2021 to 2/17/22 extend to 3/18/22.     Outpatient Physical Therapy 2 times weekly for 5 weeks (effective week of 2/15/22) to include the following interventions: Cervical/Lumbar Traction, Electrical Stimulation IFC, NMES, Gait Training, Manual Therapy, Moist Heat/ Ice, Neuromuscular Re-ed, Patient Education, Self Care, Therapeutic Activites, Therapeutic Exercise and DN.      Continue Plan of Care (POC) and progress per patient tolerance.    Azeb Lancaster, PT

## 2022-03-29 ENCOUNTER — CLINICAL SUPPORT (OUTPATIENT)
Dept: REHABILITATION | Facility: HOSPITAL | Age: 72
End: 2022-03-29
Payer: MEDICARE

## 2022-03-29 DIAGNOSIS — M25.611 DECREASED ROM OF RIGHT SHOULDER: Primary | ICD-10-CM

## 2022-03-29 DIAGNOSIS — G89.29 CHRONIC RIGHT SHOULDER PAIN: ICD-10-CM

## 2022-03-29 DIAGNOSIS — R29.898 DECREASED ROM OF NECK: ICD-10-CM

## 2022-03-29 DIAGNOSIS — M25.511 CHRONIC RIGHT SHOULDER PAIN: ICD-10-CM

## 2022-03-29 PROCEDURE — 97110 THERAPEUTIC EXERCISES: CPT

## 2022-03-29 NOTE — PROGRESS NOTES
Physical Therapist Daily Treatment Note and Discharge Note     Name: Geremias Quijano  Clinic Number: [id number]    Therapy Diagnosis:   Encounter Diagnoses   Name Primary?    Decreased ROM of right shoulder Yes    Chronic right shoulder pain     Decreased ROM of neck      Physician: Martínez White MD    Visit Date: 3/29/2022   Physician Orders: PT to eval and treat  Medical Diagnosis from Referral: M67.911 (ICD-10-CM) - Rotator cuff disorder, right  Evaluation Date: 12/9/2021  Authorization Period Expiration: 6/16/22  Plan of Care Expiration: 3/18/22  Visit # / Visits authorized: 20/20; (25 total including 2021)   Cancelled visits:1    PTA VISIT 0/5     Precautions: Standard, HTN, mild Loss of hearing     Time In: 7:45 am  Time Out: 8:30  Total Time: 45 minutes    SUBJECTIVE     Today, pt reports: he is working on his HEP   He was compliant with home exercise program.  Response to previous treatment: sore  Functional change increased range of motion with activities  Pain: 0/10 at rest   Location: anterior R shoulder pain with end range of flexion/abduction      TREATMENT   Shoulder ROM: at eval/today     Active/Passive Joint Range Right Left   Flexion 150 p!; 160 no pain 140; 160   ABDuction 140; 160  135;160   External Rotation 60;90 65;70   Internal Rotation 50 p!;60 T8 70   Adduction 15 p!;30 25   Extension 30;40 30      Pain with motions of abduction of R shoulder, adduction and internal rotation     Cervical Spine AROM at eval; today    Degrees Pain   Flexion 55;55 n   Extend 25;45 n   R side bend 25;30;35 n   L side bend 25;30;35 n   R rotation 70;80 n   L rotation 65;80 n   Muscle tension with overpressure added  T spine rotation 10% L 30 % R seated; improved to 60% ROM today  Lumbar flexion :only able to reach to patellas B; improved to be able to reach 7 inches below knees  Extension 5 degrees; improved to 15 degrees     Mid trap strength 4/5 B lower trap 4+/5  R shoulder external rotation at 4+/5  today       Geremias received therapeutic exercises to develop strength, endurance, ROM, flexibility, posture and core stabilization for 40 minutes including:    Supine shoulder external rotation 1x 10 at 0, 45, 90 with red theraband then 3x 15 with green on R  Prone W R 2x 10  Prone Y 2x 5  Prone scapular tipping posterior into L back pocket 5 reps or rotation x 5 sets   Wall slides 3x 15  Supine protraction 8 # 3x 15  Side lying horizontal abduction 2#      Geremias received the following manual therapy techniques x 1 to include palpation to determine DN placement in the anterior and medial deltoid, upper trap and pect minor on the R with estim on the infraspinatus and UT sustained for 3 minutes       Pt received NMES electrical stimulation to the muscle noted in palpation above for 3 minutes     Home Exercises Provided and Patient Education Provided     Education/Self-Care provided: (during therex)   Patient educated on the importance of improved core and upper and lower extremity strength in order to improve alignment of the spine and upper and lower extremities with static positions and dynamic movement.    Patient educated on the importance of strong core and lower extremity musculature in order to improve both static and dynamic balance, improve gait mechanics, reduce fall risk and improve household and community mobility.     Written Home Exercises Provided: Patient instructed to cont prior HEP.  Exercises were reviewed and Geremias was able to demonstrate them prior to the end of the session.  Geremias demonstrated good  understanding of the education provided.     See EMR under Patient Instructions for exercises provided prior visit.    ASSESSMENT   Geremias Is progressing well towards his goals. PT reviewed his HEP again with him as he tends to change the techniques some which limit him gaining in external rotation and chest stretching. However, today he felt good chest stretching and the need to keep therex in the proper  planes to get the right angle of muscle activation. Pt shoulder positioning is better but upper trap hiking is still an intermittent issue. PT to discharge to HEP as performed today for best benefit.  Pt prognosis is Good.       Pt's spiritual, cultural and educational needs considered and pt agreeable to plan of care and goals.     Anticipated barriers to physical therapy: pt has a busy work schedule, multiple joints involved    Goals:   Short Term Goals: In 4 weeks   1.Pt to be educated on HEP. met  2.Patient to increase GH ROM to 165 or better. met  3.Patient to have no pain with shoulder internal rotation at 90 degrees. met  4.Patient to have pain less than 4/10 with lifting at the gym. met  5.Patient to improve score on the FOTO by 10%. met  6. Pt to be educated on postural and body mechanics awareness. Met  STG update to be met by 3/04/22  1. Pt shoulder flexion to 165 degrees on the R. met  2. Cervical extension to 45 degrees or better. met  3. Cervical side flexion to 40 degrees or better. Not met     Long Term Goals: In 10 weeks 2/17/22  1. Patient to improve score on the FOTO to 27% limitation or better. met  2. Patient to demo increase in UE strength to 4/5 with the lower traps. met  3. Patient to demonstrate increased cervical extension to 60 degrees or better.not met but improved  4. Patient to demo shoulder abduction up to 3# without pain. met  5. Patient to perform daily activities including single arm lifting overhead up to 10 # or more without limitation to simulate some home maintenance tasks. Not met in clinic but 8 # today without difficulty  LTG update to be met in 5 weeks by 3/18/22  1. Lower trap strength at 4/5. progressing  2.overhead press up to 10# without pain progressing. Performed 8 # without pain or difficulty but 10 not assessed due to time.  3. Shoulder internal rotation ROM to increase to reaching T8 (today at T10 by end of session). Met by end of session       PLAN   Discharge to  self care     Azeb Lancaster, PT

## 2022-03-29 NOTE — PLAN OF CARE
Physical Therapist Daily Treatment Note and Discharge Note     Name: Geremias Quijano  Clinic Number: [id number]    Therapy Diagnosis:   Encounter Diagnoses   Name Primary?    Decreased ROM of right shoulder Yes    Chronic right shoulder pain     Decreased ROM of neck      Physician: Martínez White MD    Visit Date: 3/29/2022   Physician Orders: PT to eval and treat  Medical Diagnosis from Referral: M67.911 (ICD-10-CM) - Rotator cuff disorder, right  Evaluation Date: 12/9/2021  Authorization Period Expiration: 6/16/22  Plan of Care Expiration: 3/18/22  Visit # / Visits authorized: 20/20; (25 total including 2021)   Cancelled visits:1    PTA VISIT 0/5     Precautions: Standard, HTN, mild Loss of hearing     Time In: 7:45 am  Time Out: 8:30  Total Time: 45 minutes    SUBJECTIVE     Today, pt reports: he is working on his HEP   He was compliant with home exercise program.  Response to previous treatment: sore  Functional change increased range of motion with activities  Pain: 0/10 at rest   Location: anterior R shoulder pain with end range of flexion/abduction      TREATMENT   Shoulder ROM: at eval/today     Active/Passive Joint Range Right Left   Flexion 150 p!; 160 no pain 140; 160   ABDuction 140; 160  135;160   External Rotation 60;90 65;70   Internal Rotation 50 p!;60 T8 70   Adduction 15 p!;30 25   Extension 30;40 30      Pain with motions of abduction of R shoulder, adduction and internal rotation     Cervical Spine AROM at eval; today    Degrees Pain   Flexion 55;55 n   Extend 25;45 n   R side bend 25;30;35 n   L side bend 25;30;35 n   R rotation 70;80 n   L rotation 65;80 n   Muscle tension with overpressure added  T spine rotation 10% L 30 % R seated; improved to 60% ROM today  Lumbar flexion :only able to reach to patellas B; improved to be able to reach 7 inches below knees  Extension 5 degrees; improved to 15 degrees     Mid trap strength 4/5 B lower trap 4+/5  R shoulder external rotation at 4+/5  today       Geremias received therapeutic exercises to develop strength, endurance, ROM, flexibility, posture and core stabilization for 40 minutes including:    Supine shoulder external rotation 1x 10 at 0, 45, 90 with red theraband then 3x 15 with green on R  Prone W R 2x 10  Prone Y 2x 5  Prone scapular tipping posterior into L back pocket 5 reps or rotation x 5 sets   Wall slides 3x 15  Supine protraction 8 # 3x 15  Side lying horizontal abduction 2#      Geremias received the following manual therapy techniques x 1 to include palpation to determine DN placement in the anterior and medial deltoid, upper trap and pect minor on the R with estim on the infraspinatus and UT sustained for 3 minutes       Pt received NMES electrical stimulation to the muscle noted in palpation above for 3 minutes     Home Exercises Provided and Patient Education Provided     Education/Self-Care provided: (during therex)   Patient educated on the importance of improved core and upper and lower extremity strength in order to improve alignment of the spine and upper and lower extremities with static positions and dynamic movement.    Patient educated on the importance of strong core and lower extremity musculature in order to improve both static and dynamic balance, improve gait mechanics, reduce fall risk and improve household and community mobility.     Written Home Exercises Provided: Patient instructed to cont prior HEP.  Exercises were reviewed and Geremias was able to demonstrate them prior to the end of the session.  Geremias demonstrated good  understanding of the education provided.     See EMR under Patient Instructions for exercises provided prior visit.    ASSESSMENT   Geremias Is progressing well towards his goals. PT reviewed his HEP again with him as he tends to change the techniques some which limit him gaining in external rotation and chest stretching. However, today he felt good chest stretching and the need to keep therex in the proper  planes to get the right angle of muscle activation. Pt shoulder positioning is better but upper trap hiking is still an intermittent issue. PT to discharge to HEP as performed today for best benefit.  Pt prognosis is Good.       Pt's spiritual, cultural and educational needs considered and pt agreeable to plan of care and goals.     Anticipated barriers to physical therapy: pt has a busy work schedule, multiple joints involved    Goals:   Short Term Goals: In 4 weeks   1.Pt to be educated on HEP. met  2.Patient to increase GH ROM to 165 or better. met  3.Patient to have no pain with shoulder internal rotation at 90 degrees. met  4.Patient to have pain less than 4/10 with lifting at the gym. met  5.Patient to improve score on the FOTO by 10%. met  6. Pt to be educated on postural and body mechanics awareness. Met  STG update to be met by 3/04/22  1. Pt shoulder flexion to 165 degrees on the R. met  2. Cervical extension to 45 degrees or better. met  3. Cervical side flexion to 40 degrees or better. Not met     Long Term Goals: In 10 weeks 2/17/22  1. Patient to improve score on the FOTO to 27% limitation or better. met  2. Patient to demo increase in UE strength to 4/5 with the lower traps. met  3. Patient to demonstrate increased cervical extension to 60 degrees or better.not met but improved  4. Patient to demo shoulder abduction up to 3# without pain. met  5. Patient to perform daily activities including single arm lifting overhead up to 10 # or more without limitation to simulate some home maintenance tasks. Not met in clinic but 8 # today without difficulty  LTG update to be met in 5 weeks by 3/18/22  1. Lower trap strength at 4/5. progressing  2.overhead press up to 10# without pain progressing. Performed 8 # without pain or difficulty but 10 not assessed due to time.  3. Shoulder internal rotation ROM to increase to reaching T8 (today at T10 by end of session). Met by end of session       PLAN   Discharge to  self care     Azeb Lancaster, PT

## 2022-04-14 ENCOUNTER — TELEPHONE (OUTPATIENT)
Dept: ADMINISTRATIVE | Facility: HOSPITAL | Age: 72
End: 2022-04-14
Payer: MEDICARE

## 2022-05-06 ENCOUNTER — TELEPHONE (OUTPATIENT)
Dept: ADMINISTRATIVE | Facility: HOSPITAL | Age: 72
End: 2022-05-06
Payer: MEDICARE

## 2022-05-18 ENCOUNTER — OFFICE VISIT (OUTPATIENT)
Dept: INTERNAL MEDICINE | Facility: CLINIC | Age: 72
End: 2022-05-18
Payer: MEDICARE

## 2022-05-18 ENCOUNTER — PATIENT MESSAGE (OUTPATIENT)
Dept: INTERNAL MEDICINE | Facility: CLINIC | Age: 72
End: 2022-05-18

## 2022-05-18 VITALS
HEART RATE: 74 BPM | TEMPERATURE: 98 F | SYSTOLIC BLOOD PRESSURE: 138 MMHG | RESPIRATION RATE: 18 BRPM | WEIGHT: 237.19 LBS | HEIGHT: 71 IN | DIASTOLIC BLOOD PRESSURE: 74 MMHG | OXYGEN SATURATION: 97 % | BODY MASS INDEX: 33.21 KG/M2

## 2022-05-18 DIAGNOSIS — I10 PRIMARY HYPERTENSION: ICD-10-CM

## 2022-05-18 DIAGNOSIS — E78.00 PURE HYPERCHOLESTEROLEMIA: ICD-10-CM

## 2022-05-18 DIAGNOSIS — Z00.00 ENCOUNTER FOR PREVENTIVE HEALTH EXAMINATION: Primary | ICD-10-CM

## 2022-05-18 PROCEDURE — 3288F PR FALLS RISK ASSESSMENT DOCUMENTED: ICD-10-PCS | Mod: CPTII,S$GLB,, | Performed by: NURSE PRACTITIONER

## 2022-05-18 PROCEDURE — 1170F FXNL STATUS ASSESSED: CPT | Mod: CPTII,S$GLB,, | Performed by: NURSE PRACTITIONER

## 2022-05-18 PROCEDURE — 3008F BODY MASS INDEX DOCD: CPT | Mod: CPTII,S$GLB,, | Performed by: NURSE PRACTITIONER

## 2022-05-18 PROCEDURE — 3078F DIAST BP <80 MM HG: CPT | Mod: CPTII,S$GLB,, | Performed by: NURSE PRACTITIONER

## 2022-05-18 PROCEDURE — 4010F PR ACE/ARB THEARPY RXD/TAKEN: ICD-10-PCS | Mod: CPTII,S$GLB,, | Performed by: NURSE PRACTITIONER

## 2022-05-18 PROCEDURE — 1170F PR FUNCTIONAL STATUS ASSESSED: ICD-10-PCS | Mod: CPTII,S$GLB,, | Performed by: NURSE PRACTITIONER

## 2022-05-18 PROCEDURE — 4010F ACE/ARB THERAPY RXD/TAKEN: CPT | Mod: CPTII,S$GLB,, | Performed by: NURSE PRACTITIONER

## 2022-05-18 PROCEDURE — 1159F PR MEDICATION LIST DOCUMENTED IN MEDICAL RECORD: ICD-10-PCS | Mod: CPTII,S$GLB,, | Performed by: NURSE PRACTITIONER

## 2022-05-18 PROCEDURE — 1125F PR PAIN SEVERITY QUANTIFIED, PAIN PRESENT: ICD-10-PCS | Mod: CPTII,S$GLB,, | Performed by: NURSE PRACTITIONER

## 2022-05-18 PROCEDURE — 1160F PR REVIEW ALL MEDS BY PRESCRIBER/CLIN PHARMACIST DOCUMENTED: ICD-10-PCS | Mod: CPTII,S$GLB,, | Performed by: NURSE PRACTITIONER

## 2022-05-18 PROCEDURE — G0439 PPPS, SUBSEQ VISIT: HCPCS | Mod: S$GLB,,, | Performed by: NURSE PRACTITIONER

## 2022-05-18 PROCEDURE — 1125F AMNT PAIN NOTED PAIN PRSNT: CPT | Mod: CPTII,S$GLB,, | Performed by: NURSE PRACTITIONER

## 2022-05-18 PROCEDURE — 3288F FALL RISK ASSESSMENT DOCD: CPT | Mod: CPTII,S$GLB,, | Performed by: NURSE PRACTITIONER

## 2022-05-18 PROCEDURE — 3075F PR MOST RECENT SYSTOLIC BLOOD PRESS GE 130-139MM HG: ICD-10-PCS | Mod: CPTII,S$GLB,, | Performed by: NURSE PRACTITIONER

## 2022-05-18 PROCEDURE — 3078F PR MOST RECENT DIASTOLIC BLOOD PRESSURE < 80 MM HG: ICD-10-PCS | Mod: CPTII,S$GLB,, | Performed by: NURSE PRACTITIONER

## 2022-05-18 PROCEDURE — 99999 PR PBB SHADOW E&M-EST. PATIENT-LVL IV: CPT | Mod: PBBFAC,,, | Performed by: NURSE PRACTITIONER

## 2022-05-18 PROCEDURE — G0439 PR MEDICARE ANNUAL WELLNESS SUBSEQUENT VISIT: ICD-10-PCS | Mod: S$GLB,,, | Performed by: NURSE PRACTITIONER

## 2022-05-18 PROCEDURE — 1101F PT FALLS ASSESS-DOCD LE1/YR: CPT | Mod: CPTII,S$GLB,, | Performed by: NURSE PRACTITIONER

## 2022-05-18 PROCEDURE — 1159F MED LIST DOCD IN RCRD: CPT | Mod: CPTII,S$GLB,, | Performed by: NURSE PRACTITIONER

## 2022-05-18 PROCEDURE — 3075F SYST BP GE 130 - 139MM HG: CPT | Mod: CPTII,S$GLB,, | Performed by: NURSE PRACTITIONER

## 2022-05-18 PROCEDURE — 1160F RVW MEDS BY RX/DR IN RCRD: CPT | Mod: CPTII,S$GLB,, | Performed by: NURSE PRACTITIONER

## 2022-05-18 PROCEDURE — 1101F PR PT FALLS ASSESS DOC 0-1 FALLS W/OUT INJ PAST YR: ICD-10-PCS | Mod: CPTII,S$GLB,, | Performed by: NURSE PRACTITIONER

## 2022-05-18 PROCEDURE — 3008F PR BODY MASS INDEX (BMI) DOCUMENTED: ICD-10-PCS | Mod: CPTII,S$GLB,, | Performed by: NURSE PRACTITIONER

## 2022-05-18 PROCEDURE — 99999 PR PBB SHADOW E&M-EST. PATIENT-LVL IV: ICD-10-PCS | Mod: PBBFAC,,, | Performed by: NURSE PRACTITIONER

## 2022-05-18 NOTE — PATIENT INSTRUCTIONS
Counseling and Referral of Other Preventative  (Italic type indicates deductible and co-insurance are waived)    Patient Name: Geremias Quijano  Today's Date: 5/18/2022    Health Maintenance       Date Due Completion Date    TETANUS VACCINE Never done ---    COVID-19 Vaccine (3 - Booster for Moderna series) 02/08/2022 9/8/2021    PROSTATE-SPECIFIC ANTIGEN 12/01/2022 12/1/2021    Lipid Panel 12/01/2022 12/1/2021    Colorectal Cancer Screening 08/29/2023 8/29/2018        No orders of the defined types were placed in this encounter.    The following information is provided to all patients.  This information is to help you find resources for any of the problems found today that may be affecting your health:                Living healthy guide: www.[facility].[place].gov      Understanding Diabetes: www.diabetes.org      Eating healthy: www.cdc.gov/healthyweight      [facility] home safety checklist: www.cdc.gov/steadi/patient.html      Agency on Aging: www.goea.[place].gov      Alcoholics anonymous (AA): www.aa.org      Physical Activity: www.chidi.nih.gov/eq0jfne      Tobacco use: www.quitwithusla.org

## 2022-05-18 NOTE — PROGRESS NOTES
"  Geremias Quijano presented for a  Medicare AWV and comprehensive Health Risk Assessment today. The following components were reviewed and updated:    · Medical history  · Family History  · Social history  · Allergies and Current Medications  · Health Risk Assessment  · Health Maintenance  · Care Team         ** See Completed Assessments for Annual Wellness Visit within the encounter summary.**         The following assessments were completed:  · Living Situation  · CAGE  · Depression Screening  · Timed Get Up and Go  · Whisper Test  · Cognitive Function Screening  · Nutrition Screening  · ADL Screening  · PAQ Screening        Vitals:    05/18/22 0858   BP: 138/74   Pulse: 74   Resp: 18   Temp: 98 °F (36.7 °C)   SpO2: 97%   Weight: 107.6 kg (237 lb 3.4 oz)   Height: 5' 11" (1.803 m)     Body mass index is 33.08 kg/m².  Physical Exam  Constitutional:       General: He is not in acute distress.     Appearance: Normal appearance. He is well-developed. He is not ill-appearing, toxic-appearing or diaphoretic.   HENT:      Head: Normocephalic and atraumatic.      Right Ear: External ear normal.      Left Ear: External ear normal.      Nose: Nose normal.      Mouth/Throat:      Mouth: Mucous membranes are moist.      Pharynx: No posterior oropharyngeal erythema.   Eyes:      Extraocular Movements: Extraocular movements intact.      Conjunctiva/sclera: Conjunctivae normal.   Neck:      Vascular: No carotid bruit.   Cardiovascular:      Rate and Rhythm: Normal rate and regular rhythm.      Pulses: Normal pulses.      Heart sounds: Normal heart sounds. No murmur heard.    No friction rub. No gallop.   Pulmonary:      Effort: Pulmonary effort is normal. No respiratory distress.      Breath sounds: Normal breath sounds. No stridor. No wheezing, rhonchi or rales.   Chest:      Chest wall: No tenderness.   Abdominal:      General: Bowel sounds are normal. There is no distension.      Palpations: Abdomen is soft. There is no mass. "      Tenderness: There is no abdominal tenderness.      Hernia: No hernia is present.   Musculoskeletal:         General: No tenderness. Normal range of motion.      Cervical back: Normal range of motion and neck supple. No tenderness.   Lymphadenopathy:      Cervical: No cervical adenopathy.   Skin:     General: Skin is warm and dry.   Neurological:      Mental Status: He is alert and oriented to person, place, and time.      Cranial Nerves: No cranial nerve deficit.   Psychiatric:         Mood and Affect: Mood normal.         Behavior: Behavior normal.               Diagnoses and health risks identified today and associated recommendations/orders:    1. Encounter for preventive health examination  Screenings performed, as noted above.  Personal preventative testing needs reviewed.     2. Primary hypertension  Monitored/treated on meds, continue the same tx, stable, follow up with pcp    3. Pure hypercholesterolemia  Monitored/treated on meds, continue the same tx, stable, follow up with pcp    Discussed covid booster      Provided Geremias with a 5-10 year written screening schedule and personal prevention plan. Recommendations were developed using the USPSTF age appropriate recommendations. Education, counseling, and referrals were provided as needed. After Visit Summary printed and given to patient which includes a list of additional screenings\tests needed.    No follow-ups on file.    Jean Mejía NP  I offered to discuss advanced care planning, including how to pick a person who would make decisions for you if you were unable to make them for yourself, called a health care power of , and what kind of decisions you might make such as use of life sustaining treatments such as ventilators and tube feeding when faced with a life limiting illness recorded on a living will that they will need to know. (How you want to be cared for as you near the end of your natural life)     X Patient is interested in  learning more about how to make advanced directives.  I provided them paperwork and offered to discuss this with them.

## 2022-06-08 ENCOUNTER — LAB VISIT (OUTPATIENT)
Dept: LAB | Facility: HOSPITAL | Age: 72
End: 2022-06-08
Attending: INTERNAL MEDICINE
Payer: MEDICARE

## 2022-06-08 DIAGNOSIS — I10 PRIMARY HYPERTENSION: ICD-10-CM

## 2022-06-08 LAB
ANION GAP SERPL CALC-SCNC: 9 MMOL/L (ref 8–16)
BUN SERPL-MCNC: 16 MG/DL (ref 8–23)
CALCIUM SERPL-MCNC: 9.2 MG/DL (ref 8.7–10.5)
CHLORIDE SERPL-SCNC: 103 MMOL/L (ref 95–110)
CHOLEST SERPL-MCNC: 170 MG/DL (ref 120–199)
CHOLEST/HDLC SERPL: 3.8 {RATIO} (ref 2–5)
CO2 SERPL-SCNC: 25 MMOL/L (ref 23–29)
CREAT SERPL-MCNC: 1.1 MG/DL (ref 0.5–1.4)
EST. GFR  (AFRICAN AMERICAN): >60 ML/MIN/1.73 M^2
EST. GFR  (NON AFRICAN AMERICAN): >60 ML/MIN/1.73 M^2
GLUCOSE SERPL-MCNC: 92 MG/DL (ref 70–110)
HDLC SERPL-MCNC: 45 MG/DL (ref 40–75)
HDLC SERPL: 26.5 % (ref 20–50)
LDLC SERPL CALC-MCNC: 109 MG/DL (ref 63–159)
NONHDLC SERPL-MCNC: 125 MG/DL
POTASSIUM SERPL-SCNC: 4.3 MMOL/L (ref 3.5–5.1)
SODIUM SERPL-SCNC: 137 MMOL/L (ref 136–145)
TRIGL SERPL-MCNC: 80 MG/DL (ref 30–150)

## 2022-06-08 PROCEDURE — 80048 BASIC METABOLIC PNL TOTAL CA: CPT | Performed by: INTERNAL MEDICINE

## 2022-06-08 PROCEDURE — 36415 COLL VENOUS BLD VENIPUNCTURE: CPT | Mod: PO | Performed by: INTERNAL MEDICINE

## 2022-06-08 PROCEDURE — 80061 LIPID PANEL: CPT | Performed by: INTERNAL MEDICINE

## 2022-06-14 ENCOUNTER — OFFICE VISIT (OUTPATIENT)
Dept: INTERNAL MEDICINE | Facility: CLINIC | Age: 72
End: 2022-06-14
Payer: MEDICARE

## 2022-06-14 VITALS
WEIGHT: 238.13 LBS | OXYGEN SATURATION: 97 % | SYSTOLIC BLOOD PRESSURE: 132 MMHG | HEART RATE: 66 BPM | DIASTOLIC BLOOD PRESSURE: 82 MMHG | HEIGHT: 71 IN | BODY MASS INDEX: 33.34 KG/M2

## 2022-06-14 DIAGNOSIS — Z12.5 PROSTATE CANCER SCREENING: ICD-10-CM

## 2022-06-14 DIAGNOSIS — I10 PRIMARY HYPERTENSION: ICD-10-CM

## 2022-06-14 DIAGNOSIS — E78.00 PURE HYPERCHOLESTEROLEMIA: Primary | ICD-10-CM

## 2022-06-14 DIAGNOSIS — Z86.010 HISTORY OF COLON POLYPS: ICD-10-CM

## 2022-06-14 PROCEDURE — 3288F FALL RISK ASSESSMENT DOCD: CPT | Mod: CPTII,S$GLB,, | Performed by: INTERNAL MEDICINE

## 2022-06-14 PROCEDURE — 1126F AMNT PAIN NOTED NONE PRSNT: CPT | Mod: CPTII,S$GLB,, | Performed by: INTERNAL MEDICINE

## 2022-06-14 PROCEDURE — 3008F BODY MASS INDEX DOCD: CPT | Mod: CPTII,S$GLB,, | Performed by: INTERNAL MEDICINE

## 2022-06-14 PROCEDURE — 99214 PR OFFICE/OUTPT VISIT, EST, LEVL IV, 30-39 MIN: ICD-10-PCS | Mod: S$GLB,,, | Performed by: INTERNAL MEDICINE

## 2022-06-14 PROCEDURE — 4010F PR ACE/ARB THEARPY RXD/TAKEN: ICD-10-PCS | Mod: CPTII,S$GLB,, | Performed by: INTERNAL MEDICINE

## 2022-06-14 PROCEDURE — 1159F MED LIST DOCD IN RCRD: CPT | Mod: CPTII,S$GLB,, | Performed by: INTERNAL MEDICINE

## 2022-06-14 PROCEDURE — 3079F PR MOST RECENT DIASTOLIC BLOOD PRESSURE 80-89 MM HG: ICD-10-PCS | Mod: CPTII,S$GLB,, | Performed by: INTERNAL MEDICINE

## 2022-06-14 PROCEDURE — 99214 OFFICE O/P EST MOD 30 MIN: CPT | Mod: S$GLB,,, | Performed by: INTERNAL MEDICINE

## 2022-06-14 PROCEDURE — 3288F PR FALLS RISK ASSESSMENT DOCUMENTED: ICD-10-PCS | Mod: CPTII,S$GLB,, | Performed by: INTERNAL MEDICINE

## 2022-06-14 PROCEDURE — 1101F PR PT FALLS ASSESS DOC 0-1 FALLS W/OUT INJ PAST YR: ICD-10-PCS | Mod: CPTII,S$GLB,, | Performed by: INTERNAL MEDICINE

## 2022-06-14 PROCEDURE — 3075F SYST BP GE 130 - 139MM HG: CPT | Mod: CPTII,S$GLB,, | Performed by: INTERNAL MEDICINE

## 2022-06-14 PROCEDURE — 1101F PT FALLS ASSESS-DOCD LE1/YR: CPT | Mod: CPTII,S$GLB,, | Performed by: INTERNAL MEDICINE

## 2022-06-14 PROCEDURE — 99999 PR PBB SHADOW E&M-EST. PATIENT-LVL III: ICD-10-PCS | Mod: PBBFAC,,, | Performed by: INTERNAL MEDICINE

## 2022-06-14 PROCEDURE — 99999 PR PBB SHADOW E&M-EST. PATIENT-LVL III: CPT | Mod: PBBFAC,,, | Performed by: INTERNAL MEDICINE

## 2022-06-14 PROCEDURE — 3075F PR MOST RECENT SYSTOLIC BLOOD PRESS GE 130-139MM HG: ICD-10-PCS | Mod: CPTII,S$GLB,, | Performed by: INTERNAL MEDICINE

## 2022-06-14 PROCEDURE — 3008F PR BODY MASS INDEX (BMI) DOCUMENTED: ICD-10-PCS | Mod: CPTII,S$GLB,, | Performed by: INTERNAL MEDICINE

## 2022-06-14 PROCEDURE — 1159F PR MEDICATION LIST DOCUMENTED IN MEDICAL RECORD: ICD-10-PCS | Mod: CPTII,S$GLB,, | Performed by: INTERNAL MEDICINE

## 2022-06-14 PROCEDURE — 3079F DIAST BP 80-89 MM HG: CPT | Mod: CPTII,S$GLB,, | Performed by: INTERNAL MEDICINE

## 2022-06-14 PROCEDURE — 4010F ACE/ARB THERAPY RXD/TAKEN: CPT | Mod: CPTII,S$GLB,, | Performed by: INTERNAL MEDICINE

## 2022-06-14 PROCEDURE — 1126F PR PAIN SEVERITY QUANTIFIED, NO PAIN PRESENT: ICD-10-PCS | Mod: CPTII,S$GLB,, | Performed by: INTERNAL MEDICINE

## 2022-06-14 NOTE — PROGRESS NOTES
"HPI:  PT comes for f/u of HTN an lipids. He is doing well. No complaints. BP at home well controlled.     Current meds have been verified and updated per the EMR  Exam:/82 (BP Location: Right arm)   Pulse 66   Ht 5' 11" (1.803 m)   Wt 108 kg (238 lb 1.6 oz)   SpO2 97%   BMI 33.21 kg/m²   Carotids 2+ = w/o bruits  Chest CTA  CVS rr&r w/o mgr    Lab Results   Component Value Date    WBC 7.31 12/01/2021    HGB 15.4 12/01/2021    HCT 48.9 12/01/2021     12/01/2021    CHOL 170 06/08/2022    TRIG 80 06/08/2022    HDL 45 06/08/2022    ALT 26 12/01/2021    AST 22 12/01/2021     06/08/2022    K 4.3 06/08/2022     06/08/2022    CREATININE 1.1 06/08/2022    BUN 16 06/08/2022    CO2 25 06/08/2022    TSH 1.048 12/01/2021    PSA 2.4 12/01/2021       Impression:  Stable problems below  Patient Active Problem List   Diagnosis    HTN (hypertension)    Hyperlipemia    History of colon polyps    Decreased ROM of right shoulder    Right shoulder pain    Decreased ROM of neck       Plan:  Orders Placed This Encounter    Comprehensive Metabolic Panel    Lipid Panel    TSH    CBC Auto Differential    PSA, Screening     meds the same, see me in six mths with the above labs    This note is generated with speech recognition software and is subject to transcription error and sound alike phrases that may be missed by proofreading.      "

## 2022-12-07 ENCOUNTER — LAB VISIT (OUTPATIENT)
Dept: LAB | Facility: HOSPITAL | Age: 72
End: 2022-12-07
Attending: INTERNAL MEDICINE
Payer: MEDICARE

## 2022-12-07 DIAGNOSIS — I10 PRIMARY HYPERTENSION: ICD-10-CM

## 2022-12-07 DIAGNOSIS — Z12.5 PROSTATE CANCER SCREENING: ICD-10-CM

## 2022-12-07 LAB
ALBUMIN SERPL BCP-MCNC: 3.7 G/DL (ref 3.5–5.2)
ALP SERPL-CCNC: 119 U/L (ref 55–135)
ALT SERPL W/O P-5'-P-CCNC: 22 U/L (ref 10–44)
ANION GAP SERPL CALC-SCNC: 7 MMOL/L (ref 8–16)
AST SERPL-CCNC: 18 U/L (ref 10–40)
BASOPHILS # BLD AUTO: 0.07 K/UL (ref 0–0.2)
BASOPHILS NFR BLD: 1 % (ref 0–1.9)
BILIRUB SERPL-MCNC: 0.6 MG/DL (ref 0.1–1)
BUN SERPL-MCNC: 18 MG/DL (ref 8–23)
CALCIUM SERPL-MCNC: 9.6 MG/DL (ref 8.7–10.5)
CHLORIDE SERPL-SCNC: 105 MMOL/L (ref 95–110)
CHOLEST SERPL-MCNC: 183 MG/DL (ref 120–199)
CHOLEST/HDLC SERPL: 4.7 {RATIO} (ref 2–5)
CO2 SERPL-SCNC: 25 MMOL/L (ref 23–29)
COMPLEXED PSA SERPL-MCNC: 2.4 NG/ML (ref 0–4)
CREAT SERPL-MCNC: 1.2 MG/DL (ref 0.5–1.4)
DIFFERENTIAL METHOD: ABNORMAL
EOSINOPHIL # BLD AUTO: 0.2 K/UL (ref 0–0.5)
EOSINOPHIL NFR BLD: 3.5 % (ref 0–8)
ERYTHROCYTE [DISTWIDTH] IN BLOOD BY AUTOMATED COUNT: 12.4 % (ref 11.5–14.5)
EST. GFR  (NO RACE VARIABLE): >60 ML/MIN/1.73 M^2
GLUCOSE SERPL-MCNC: 96 MG/DL (ref 70–110)
HCT VFR BLD AUTO: 50.1 % (ref 40–54)
HDLC SERPL-MCNC: 39 MG/DL (ref 40–75)
HDLC SERPL: 21.3 % (ref 20–50)
HGB BLD-MCNC: 16 G/DL (ref 14–18)
IMM GRANULOCYTES # BLD AUTO: 0.01 K/UL (ref 0–0.04)
IMM GRANULOCYTES NFR BLD AUTO: 0.1 % (ref 0–0.5)
LDLC SERPL CALC-MCNC: 126.8 MG/DL (ref 63–159)
LYMPHOCYTES # BLD AUTO: 2 K/UL (ref 1–4.8)
LYMPHOCYTES NFR BLD: 29.5 % (ref 18–48)
MCH RBC QN AUTO: 28.7 PG (ref 27–31)
MCHC RBC AUTO-ENTMCNC: 31.9 G/DL (ref 32–36)
MCV RBC AUTO: 90 FL (ref 82–98)
MONOCYTES # BLD AUTO: 0.6 K/UL (ref 0.3–1)
MONOCYTES NFR BLD: 9.2 % (ref 4–15)
NEUTROPHILS # BLD AUTO: 3.9 K/UL (ref 1.8–7.7)
NEUTROPHILS NFR BLD: 56.7 % (ref 38–73)
NONHDLC SERPL-MCNC: 144 MG/DL
NRBC BLD-RTO: 0 /100 WBC
PLATELET # BLD AUTO: 312 K/UL (ref 150–450)
PMV BLD AUTO: 10.2 FL (ref 9.2–12.9)
POTASSIUM SERPL-SCNC: 5.1 MMOL/L (ref 3.5–5.1)
PROT SERPL-MCNC: 7.2 G/DL (ref 6–8.4)
RBC # BLD AUTO: 5.58 M/UL (ref 4.6–6.2)
SODIUM SERPL-SCNC: 137 MMOL/L (ref 136–145)
TRIGL SERPL-MCNC: 86 MG/DL (ref 30–150)
TSH SERPL DL<=0.005 MIU/L-ACNC: 1.07 UIU/ML (ref 0.4–4)
WBC # BLD AUTO: 6.84 K/UL (ref 3.9–12.7)

## 2022-12-07 PROCEDURE — 80053 COMPREHEN METABOLIC PANEL: CPT | Performed by: INTERNAL MEDICINE

## 2022-12-07 PROCEDURE — 36415 COLL VENOUS BLD VENIPUNCTURE: CPT | Mod: PO | Performed by: INTERNAL MEDICINE

## 2022-12-07 PROCEDURE — 84153 ASSAY OF PSA TOTAL: CPT | Performed by: INTERNAL MEDICINE

## 2022-12-07 PROCEDURE — 85025 COMPLETE CBC W/AUTO DIFF WBC: CPT | Performed by: INTERNAL MEDICINE

## 2022-12-07 PROCEDURE — 84443 ASSAY THYROID STIM HORMONE: CPT | Performed by: INTERNAL MEDICINE

## 2022-12-07 PROCEDURE — 80061 LIPID PANEL: CPT | Performed by: INTERNAL MEDICINE

## 2022-12-14 ENCOUNTER — OFFICE VISIT (OUTPATIENT)
Dept: INTERNAL MEDICINE | Facility: CLINIC | Age: 72
End: 2022-12-14
Payer: MEDICARE

## 2022-12-14 VITALS
WEIGHT: 230.38 LBS | HEART RATE: 69 BPM | TEMPERATURE: 98 F | HEIGHT: 71 IN | SYSTOLIC BLOOD PRESSURE: 118 MMHG | BODY MASS INDEX: 32.25 KG/M2 | OXYGEN SATURATION: 94 % | DIASTOLIC BLOOD PRESSURE: 70 MMHG

## 2022-12-14 DIAGNOSIS — I10 PRIMARY HYPERTENSION: ICD-10-CM

## 2022-12-14 DIAGNOSIS — Z00.00 ROUTINE GENERAL MEDICAL EXAMINATION AT A HEALTH CARE FACILITY: Primary | ICD-10-CM

## 2022-12-14 DIAGNOSIS — M25.572 CHRONIC PAIN OF LEFT ANKLE: ICD-10-CM

## 2022-12-14 DIAGNOSIS — Z86.010 HISTORY OF COLON POLYPS: ICD-10-CM

## 2022-12-14 DIAGNOSIS — E78.00 PURE HYPERCHOLESTEROLEMIA: ICD-10-CM

## 2022-12-14 DIAGNOSIS — G89.29 CHRONIC PAIN OF LEFT ANKLE: ICD-10-CM

## 2022-12-14 PROCEDURE — 4010F ACE/ARB THERAPY RXD/TAKEN: CPT | Mod: CPTII,S$GLB,, | Performed by: INTERNAL MEDICINE

## 2022-12-14 PROCEDURE — 99397 PER PM REEVAL EST PAT 65+ YR: CPT | Mod: S$GLB,,, | Performed by: INTERNAL MEDICINE

## 2022-12-14 PROCEDURE — 1101F PR PT FALLS ASSESS DOC 0-1 FALLS W/OUT INJ PAST YR: ICD-10-PCS | Mod: CPTII,S$GLB,, | Performed by: INTERNAL MEDICINE

## 2022-12-14 PROCEDURE — 1159F PR MEDICATION LIST DOCUMENTED IN MEDICAL RECORD: ICD-10-PCS | Mod: CPTII,S$GLB,, | Performed by: INTERNAL MEDICINE

## 2022-12-14 PROCEDURE — 3288F FALL RISK ASSESSMENT DOCD: CPT | Mod: CPTII,S$GLB,, | Performed by: INTERNAL MEDICINE

## 2022-12-14 PROCEDURE — 1160F PR REVIEW ALL MEDS BY PRESCRIBER/CLIN PHARMACIST DOCUMENTED: ICD-10-PCS | Mod: CPTII,S$GLB,, | Performed by: INTERNAL MEDICINE

## 2022-12-14 PROCEDURE — 99999 PR PBB SHADOW E&M-EST. PATIENT-LVL III: ICD-10-PCS | Mod: PBBFAC,,, | Performed by: INTERNAL MEDICINE

## 2022-12-14 PROCEDURE — 1101F PT FALLS ASSESS-DOCD LE1/YR: CPT | Mod: CPTII,S$GLB,, | Performed by: INTERNAL MEDICINE

## 2022-12-14 PROCEDURE — 3288F PR FALLS RISK ASSESSMENT DOCUMENTED: ICD-10-PCS | Mod: CPTII,S$GLB,, | Performed by: INTERNAL MEDICINE

## 2022-12-14 PROCEDURE — 1160F RVW MEDS BY RX/DR IN RCRD: CPT | Mod: CPTII,S$GLB,, | Performed by: INTERNAL MEDICINE

## 2022-12-14 PROCEDURE — 3074F PR MOST RECENT SYSTOLIC BLOOD PRESSURE < 130 MM HG: ICD-10-PCS | Mod: CPTII,S$GLB,, | Performed by: INTERNAL MEDICINE

## 2022-12-14 PROCEDURE — 99397 PR PREVENTIVE VISIT,EST,65 & OVER: ICD-10-PCS | Mod: S$GLB,,, | Performed by: INTERNAL MEDICINE

## 2022-12-14 PROCEDURE — 3078F PR MOST RECENT DIASTOLIC BLOOD PRESSURE < 80 MM HG: ICD-10-PCS | Mod: CPTII,S$GLB,, | Performed by: INTERNAL MEDICINE

## 2022-12-14 PROCEDURE — 1126F AMNT PAIN NOTED NONE PRSNT: CPT | Mod: CPTII,S$GLB,, | Performed by: INTERNAL MEDICINE

## 2022-12-14 PROCEDURE — 1126F PR PAIN SEVERITY QUANTIFIED, NO PAIN PRESENT: ICD-10-PCS | Mod: CPTII,S$GLB,, | Performed by: INTERNAL MEDICINE

## 2022-12-14 PROCEDURE — 3008F PR BODY MASS INDEX (BMI) DOCUMENTED: ICD-10-PCS | Mod: CPTII,S$GLB,, | Performed by: INTERNAL MEDICINE

## 2022-12-14 PROCEDURE — 99999 PR PBB SHADOW E&M-EST. PATIENT-LVL III: CPT | Mod: PBBFAC,,, | Performed by: INTERNAL MEDICINE

## 2022-12-14 PROCEDURE — 3074F SYST BP LT 130 MM HG: CPT | Mod: CPTII,S$GLB,, | Performed by: INTERNAL MEDICINE

## 2022-12-14 PROCEDURE — 3078F DIAST BP <80 MM HG: CPT | Mod: CPTII,S$GLB,, | Performed by: INTERNAL MEDICINE

## 2022-12-14 PROCEDURE — 4010F PR ACE/ARB THEARPY RXD/TAKEN: ICD-10-PCS | Mod: CPTII,S$GLB,, | Performed by: INTERNAL MEDICINE

## 2022-12-14 PROCEDURE — 3008F BODY MASS INDEX DOCD: CPT | Mod: CPTII,S$GLB,, | Performed by: INTERNAL MEDICINE

## 2022-12-14 PROCEDURE — 1159F MED LIST DOCD IN RCRD: CPT | Mod: CPTII,S$GLB,, | Performed by: INTERNAL MEDICINE

## 2022-12-14 NOTE — PROGRESS NOTES
"HPI:  Patient is a 72-year-old man who comes today for follow-up of his hypertension, lipids and for his annual physical.  He is doing well.  He has no complaints or problems.      Current MEDS: medcard review, verified and update  Allergies: Per the electronic medical record    Past Medical History:   Diagnosis Date    History of colon polyps     HTN (hypertension)     Hyperlipemia        Past Surgical History:   Procedure Laterality Date    COLONOSCOPY N/A 11/13/2015    Procedure: COLONOSCOPY;  Surgeon: Layo Hernandez III, MD;  Location: [facility] ENDO;  Service: Endoscopy;  Laterality: N/A;    COLONOSCOPY N/A 8/29/2018    Procedure: COLONOSCOPY;  Surgeon: Layo Hernandez III, MD;  Location: [facility] ENDO;  Service: Endoscopy;  Laterality: N/A;    NASAL SEPTUM SURGERY         SHx: per the electronic medical record    FHx: recorded in the electronic medical record    ROS:    denies any chest pains or shortness of breath. Denies any nausea, vomiting or diarrhea. Denies any fever, chills or sweats. Denies any change in weight, voice, stool, skin or hair. Denies any dysuria, dyspepsia or dysphagia. Denies any change in vision, hearing or headaches. Denies any swollen lymph nodes or loss of memory.    PE:  /70 (BP Location: Right arm, Patient Position: Sitting, BP Method: Large (Manual))   Pulse 69   Temp 97.6 °F (36.4 °C) (Temporal)   Ht 5' 11" (1.803 m)   Wt 104.5 kg (230 lb 6.1 oz)   SpO2 (!) 94%   BMI 32.13 kg/m²   Gen: Well-developed, well-nourished, male, in no acute distress, oriented x3  HEENT: neck is supple, no adenopathy, carotids 2+ equal without bruits, thyroid exam normal size without nodules.  CHEST: clear to auscultation and percussion  CVS: regular rate and rhythm without significant murmur, gallop, or rubs  ABD: soft, benign, no rebound no guarding, no distention.  Bowel sounds are normal.     nontender.  No palpable masses.  No organomegaly and no audible bruits.  RECTAL:  Deferred.  EXT: no " clubbing, cyanosis, or edema  LYMPH: no cervical, inguinal, or axillary adenopathy  FEET: no loss of sensation.  No ulcers or pressure sores.  NEURO: gait normal.  Cranial nerves II- XII intact. No nystagmus.  Speech normal.   Gross motor and sensory unremarkable.    Lab Results   Component Value Date    WBC 6.84 12/07/2022    HGB 16.0 12/07/2022    HCT 50.1 12/07/2022     12/07/2022    CHOL 183 12/07/2022    TRIG 86 12/07/2022    HDL 39 (L) 12/07/2022    ALT 22 12/07/2022    AST 18 12/07/2022     12/07/2022    K 5.1 12/07/2022     12/07/2022    CREATININE 1.2 12/07/2022    BUN 18 12/07/2022    CO2 25 12/07/2022    TSH 1.074 12/07/2022    PSA 2.4 12/07/2022       Impression:  Stable medical problems below  Patient Active Problem List   Diagnosis    HTN (hypertension)    Hyperlipemia    History of colon polyps    Decreased ROM of right shoulder    Right shoulder pain    Decreased ROM of neck       Plan:   Orders Placed This Encounter    Lipid Panel    Basic Metabolic Panel   Medications remain the same.  He will be seen again in 6 months.    This note is generated with speech recognition software and is subject to transcription error and sound alike phrases that may be missed by proofreading.

## 2023-05-29 ENCOUNTER — LAB VISIT (OUTPATIENT)
Dept: LAB | Facility: HOSPITAL | Age: 73
End: 2023-05-29
Attending: INTERNAL MEDICINE
Payer: MEDICARE

## 2023-05-29 DIAGNOSIS — E78.00 PURE HYPERCHOLESTEROLEMIA: ICD-10-CM

## 2023-05-29 DIAGNOSIS — I10 PRIMARY HYPERTENSION: ICD-10-CM

## 2023-05-29 LAB
ANION GAP SERPL CALC-SCNC: 8 MMOL/L (ref 8–16)
BUN SERPL-MCNC: 26 MG/DL (ref 8–23)
CALCIUM SERPL-MCNC: 9.6 MG/DL (ref 8.7–10.5)
CHLORIDE SERPL-SCNC: 105 MMOL/L (ref 95–110)
CHOLEST SERPL-MCNC: 171 MG/DL (ref 120–199)
CHOLEST/HDLC SERPL: 3.2 {RATIO} (ref 2–5)
CO2 SERPL-SCNC: 26 MMOL/L (ref 23–29)
CREAT SERPL-MCNC: 1.2 MG/DL (ref 0.5–1.4)
EST. GFR  (NO RACE VARIABLE): >60 ML/MIN/1.73 M^2
GLUCOSE SERPL-MCNC: 93 MG/DL (ref 70–110)
HDLC SERPL-MCNC: 54 MG/DL (ref 40–75)
HDLC SERPL: 31.6 % (ref 20–50)
LDLC SERPL CALC-MCNC: 105.6 MG/DL (ref 63–159)
NONHDLC SERPL-MCNC: 117 MG/DL
POTASSIUM SERPL-SCNC: 4.9 MMOL/L (ref 3.5–5.1)
SODIUM SERPL-SCNC: 139 MMOL/L (ref 136–145)
TRIGL SERPL-MCNC: 57 MG/DL (ref 30–150)

## 2023-05-29 PROCEDURE — 36415 COLL VENOUS BLD VENIPUNCTURE: CPT | Mod: PO | Performed by: INTERNAL MEDICINE

## 2023-05-29 PROCEDURE — 80048 BASIC METABOLIC PNL TOTAL CA: CPT | Performed by: INTERNAL MEDICINE

## 2023-05-29 PROCEDURE — 80061 LIPID PANEL: CPT | Performed by: INTERNAL MEDICINE

## 2023-06-02 ENCOUNTER — OFFICE VISIT (OUTPATIENT)
Dept: INTERNAL MEDICINE | Facility: CLINIC | Age: 73
End: 2023-06-02
Payer: MEDICARE

## 2023-06-02 VITALS
BODY MASS INDEX: 31.6 KG/M2 | OXYGEN SATURATION: 97 % | HEART RATE: 54 BPM | DIASTOLIC BLOOD PRESSURE: 86 MMHG | WEIGHT: 225.75 LBS | SYSTOLIC BLOOD PRESSURE: 118 MMHG | HEIGHT: 71 IN

## 2023-06-02 DIAGNOSIS — Z12.5 PROSTATE CANCER SCREENING: ICD-10-CM

## 2023-06-02 DIAGNOSIS — E78.00 PURE HYPERCHOLESTEROLEMIA: Primary | ICD-10-CM

## 2023-06-02 DIAGNOSIS — Z12.11 SCREENING FOR COLON CANCER: ICD-10-CM

## 2023-06-02 DIAGNOSIS — I10 PRIMARY HYPERTENSION: ICD-10-CM

## 2023-06-02 PROCEDURE — 99214 OFFICE O/P EST MOD 30 MIN: CPT | Mod: S$GLB,,, | Performed by: INTERNAL MEDICINE

## 2023-06-02 PROCEDURE — 1101F PT FALLS ASSESS-DOCD LE1/YR: CPT | Mod: CPTII,S$GLB,, | Performed by: INTERNAL MEDICINE

## 2023-06-02 PROCEDURE — 1159F PR MEDICATION LIST DOCUMENTED IN MEDICAL RECORD: ICD-10-PCS | Mod: CPTII,S$GLB,, | Performed by: INTERNAL MEDICINE

## 2023-06-02 PROCEDURE — 1126F AMNT PAIN NOTED NONE PRSNT: CPT | Mod: CPTII,S$GLB,, | Performed by: INTERNAL MEDICINE

## 2023-06-02 PROCEDURE — 99999 PR PBB SHADOW E&M-EST. PATIENT-LVL III: ICD-10-PCS | Mod: PBBFAC,,, | Performed by: INTERNAL MEDICINE

## 2023-06-02 PROCEDURE — 4010F PR ACE/ARB THEARPY RXD/TAKEN: ICD-10-PCS | Mod: CPTII,S$GLB,, | Performed by: INTERNAL MEDICINE

## 2023-06-02 PROCEDURE — 99214 PR OFFICE/OUTPT VISIT, EST, LEVL IV, 30-39 MIN: ICD-10-PCS | Mod: S$GLB,,, | Performed by: INTERNAL MEDICINE

## 2023-06-02 PROCEDURE — 4010F ACE/ARB THERAPY RXD/TAKEN: CPT | Mod: CPTII,S$GLB,, | Performed by: INTERNAL MEDICINE

## 2023-06-02 PROCEDURE — 3079F PR MOST RECENT DIASTOLIC BLOOD PRESSURE 80-89 MM HG: ICD-10-PCS | Mod: CPTII,S$GLB,, | Performed by: INTERNAL MEDICINE

## 2023-06-02 PROCEDURE — 3288F PR FALLS RISK ASSESSMENT DOCUMENTED: ICD-10-PCS | Mod: CPTII,S$GLB,, | Performed by: INTERNAL MEDICINE

## 2023-06-02 PROCEDURE — 3008F BODY MASS INDEX DOCD: CPT | Mod: CPTII,S$GLB,, | Performed by: INTERNAL MEDICINE

## 2023-06-02 PROCEDURE — 3079F DIAST BP 80-89 MM HG: CPT | Mod: CPTII,S$GLB,, | Performed by: INTERNAL MEDICINE

## 2023-06-02 PROCEDURE — 3074F PR MOST RECENT SYSTOLIC BLOOD PRESSURE < 130 MM HG: ICD-10-PCS | Mod: CPTII,S$GLB,, | Performed by: INTERNAL MEDICINE

## 2023-06-02 PROCEDURE — 1159F MED LIST DOCD IN RCRD: CPT | Mod: CPTII,S$GLB,, | Performed by: INTERNAL MEDICINE

## 2023-06-02 PROCEDURE — 1126F PR PAIN SEVERITY QUANTIFIED, NO PAIN PRESENT: ICD-10-PCS | Mod: CPTII,S$GLB,, | Performed by: INTERNAL MEDICINE

## 2023-06-02 PROCEDURE — 1101F PR PT FALLS ASSESS DOC 0-1 FALLS W/OUT INJ PAST YR: ICD-10-PCS | Mod: CPTII,S$GLB,, | Performed by: INTERNAL MEDICINE

## 2023-06-02 PROCEDURE — 3074F SYST BP LT 130 MM HG: CPT | Mod: CPTII,S$GLB,, | Performed by: INTERNAL MEDICINE

## 2023-06-02 PROCEDURE — 3288F FALL RISK ASSESSMENT DOCD: CPT | Mod: CPTII,S$GLB,, | Performed by: INTERNAL MEDICINE

## 2023-06-02 PROCEDURE — 99999 PR PBB SHADOW E&M-EST. PATIENT-LVL III: CPT | Mod: PBBFAC,,, | Performed by: INTERNAL MEDICINE

## 2023-06-02 PROCEDURE — 3008F PR BODY MASS INDEX (BMI) DOCUMENTED: ICD-10-PCS | Mod: CPTII,S$GLB,, | Performed by: INTERNAL MEDICINE

## 2023-06-02 NOTE — PROGRESS NOTES
"HPI:  Patient is 72-year-old man comes today for follow-up of his hypertension lipids.  He continues do very well.  He has no reported problems or complaints.  He had an epidural steroid shot in his lumbar spine 3 weeks ago and he is actually doing much better.  He has no other problems or complaints.    Current meds have been verified and updated per the EMR  Exam:/86 (BP Location: Right arm)   Pulse (!) 54   Ht 5' 11" (1.803 m)   Wt 102.4 kg (225 lb 12 oz)   SpO2 97%   BMI 31.49 kg/m²   Carotids 2+ equal without bruits  Chest clear  Cardiovascular regular rate and rhythm without murmur gallop or rub    Lab Results   Component Value Date    WBC 6.84 12/07/2022    HGB 16.0 12/07/2022    HCT 50.1 12/07/2022     12/07/2022    CHOL 171 05/29/2023    TRIG 57 05/29/2023    HDL 54 05/29/2023    ALT 22 12/07/2022    AST 18 12/07/2022     05/29/2023    K 4.9 05/29/2023     05/29/2023    CREATININE 1.2 05/29/2023    BUN 26 (H) 05/29/2023    CO2 26 05/29/2023    TSH 1.074 12/07/2022    PSA 2.4 12/07/2022       Impression:  Stable problems below  Patient Active Problem List   Diagnosis    HTN (hypertension)    Hyperlipemia    History of colon polyps    Decreased ROM of right shoulder    Right shoulder pain    Decreased ROM of neck       Plan:  Orders Placed This Encounter    Comprehensive Metabolic Panel    Lipid Panel    TSH    CBC Auto Differential    PSA, Screening    Ambulatory referral/consult to Endo Procedure      Medications remain same.  He will see me again in 6 months with above lab work.  He is due for his colonoscopy.    This note is generated with speech recognition software and is subject to transcription error and sound alike phrases that may be missed by proofreading.      "

## 2023-06-06 ENCOUNTER — HOSPITAL ENCOUNTER (OUTPATIENT)
Dept: PREADMISSION TESTING | Facility: HOSPITAL | Age: 73
Discharge: HOME | End: 2023-06-06
Attending: INTERNAL MEDICINE
Payer: MEDICARE

## 2023-06-06 DIAGNOSIS — Z12.11 SCREENING FOR COLON CANCER: Primary | ICD-10-CM

## 2023-06-09 ENCOUNTER — PATIENT MESSAGE (OUTPATIENT)
Dept: INTERNAL MEDICINE | Facility: CLINIC | Age: 73
End: 2023-06-09
Payer: MEDICARE

## 2023-06-15 ENCOUNTER — ANESTHESIA (OUTPATIENT)
Dept: ENDOSCOPY | Facility: HOSPITAL | Age: 73
End: 2023-06-15
Payer: MEDICARE

## 2023-06-15 ENCOUNTER — HOSPITAL ENCOUNTER (OUTPATIENT)
Facility: HOSPITAL | Age: 73
Discharge: HOME | End: 2023-06-15
Attending: INTERNAL MEDICINE | Admitting: INTERNAL MEDICINE
Payer: MEDICARE

## 2023-06-15 ENCOUNTER — ANESTHESIA EVENT (OUTPATIENT)
Dept: ENDOSCOPY | Facility: HOSPITAL | Age: 73
End: 2023-06-15
Payer: MEDICARE

## 2023-06-15 DIAGNOSIS — Z86.010 HISTORY OF COLON POLYPS: Primary | ICD-10-CM

## 2023-06-15 PROCEDURE — 25000003 PHARM REV CODE 250: Performed by: INTERNAL MEDICINE

## 2023-06-15 PROCEDURE — 27201089 HC SNARE, DISP (ANY): Performed by: INTERNAL MEDICINE

## 2023-06-15 PROCEDURE — 45385 COLONOSCOPY W/LESION REMOVAL: CPT | Mod: PT,,, | Performed by: INTERNAL MEDICINE

## 2023-06-15 PROCEDURE — 45380 COLONOSCOPY AND BIOPSY: CPT | Mod: PT,59 | Performed by: INTERNAL MEDICINE

## 2023-06-15 PROCEDURE — 27201012 HC FORCEPS, HOT/COLD, DISP: Performed by: INTERNAL MEDICINE

## 2023-06-15 PROCEDURE — 25000003 PHARM REV CODE 250

## 2023-06-15 PROCEDURE — 88305 TISSUE EXAM BY PATHOLOGIST: ICD-10-PCS | Mod: 26,,, | Performed by: STUDENT IN AN ORGANIZED HEALTH CARE EDUCATION/TRAINING PROGRAM

## 2023-06-15 PROCEDURE — 37000009 HC ANESTHESIA EA ADD 15 MINS: Performed by: INTERNAL MEDICINE

## 2023-06-15 PROCEDURE — 88305 TISSUE EXAM BY PATHOLOGIST: CPT | Performed by: STUDENT IN AN ORGANIZED HEALTH CARE EDUCATION/TRAINING PROGRAM

## 2023-06-15 PROCEDURE — 63600175 PHARM REV CODE 636 W HCPCS

## 2023-06-15 PROCEDURE — 45385 PR COLONOSCOPY,REMV LESN,SNARE: ICD-10-PCS | Mod: PT,,, | Performed by: INTERNAL MEDICINE

## 2023-06-15 PROCEDURE — 88305 TISSUE EXAM BY PATHOLOGIST: CPT | Mod: 26,,, | Performed by: STUDENT IN AN ORGANIZED HEALTH CARE EDUCATION/TRAINING PROGRAM

## 2023-06-15 PROCEDURE — 45385 COLONOSCOPY W/LESION REMOVAL: CPT | Mod: PT | Performed by: INTERNAL MEDICINE

## 2023-06-15 PROCEDURE — 45380 PR COLONOSCOPY,BIOPSY: ICD-10-PCS | Mod: PT,59,, | Performed by: INTERNAL MEDICINE

## 2023-06-15 PROCEDURE — 45380 COLONOSCOPY AND BIOPSY: CPT | Mod: PT,59,, | Performed by: INTERNAL MEDICINE

## 2023-06-15 PROCEDURE — 37000008 HC ANESTHESIA 1ST 15 MINUTES: Performed by: INTERNAL MEDICINE

## 2023-06-15 RX ORDER — DEXTROMETHORPHAN/PSEUDOEPHED 2.5-7.5/.8
DROPS ORAL
Status: COMPLETED | OUTPATIENT
Start: 2023-06-15 | End: 2023-06-15

## 2023-06-15 RX ORDER — LIDOCAINE HYDROCHLORIDE 10 MG/ML
INJECTION, SOLUTION EPIDURAL; INFILTRATION; INTRACAUDAL; PERINEURAL
Status: DISCONTINUED | OUTPATIENT
Start: 2023-06-15 | End: 2023-06-15

## 2023-06-15 RX ORDER — SODIUM CHLORIDE, SODIUM LACTATE, POTASSIUM CHLORIDE, CALCIUM CHLORIDE 600; 310; 30; 20 MG/100ML; MG/100ML; MG/100ML; MG/100ML
INJECTION, SOLUTION INTRAVENOUS CONTINUOUS PRN
Status: DISCONTINUED | OUTPATIENT
Start: 2023-06-15 | End: 2023-06-15

## 2023-06-15 RX ORDER — PROPOFOL 10 MG/ML
VIAL (ML) INTRAVENOUS
Status: DISCONTINUED | OUTPATIENT
Start: 2023-06-15 | End: 2023-06-15

## 2023-06-15 RX ADMIN — PROPOFOL 30 MG: 10 INJECTION, EMULSION INTRAVENOUS at 08:06

## 2023-06-15 RX ADMIN — PROPOFOL 30 MG: 10 INJECTION, EMULSION INTRAVENOUS at 09:06

## 2023-06-15 RX ADMIN — PROPOFOL 40 MG: 10 INJECTION, EMULSION INTRAVENOUS at 08:06

## 2023-06-15 RX ADMIN — SODIUM CHLORIDE, SODIUM LACTATE, POTASSIUM CHLORIDE, AND CALCIUM CHLORIDE: .6; .31; .03; .02 INJECTION, SOLUTION INTRAVENOUS at 08:06

## 2023-06-15 RX ADMIN — PROPOFOL 50 MG: 10 INJECTION, EMULSION INTRAVENOUS at 08:06

## 2023-06-15 RX ADMIN — LIDOCAINE HYDROCHLORIDE 50 MG: 10 SOLUTION INTRAVENOUS at 08:06

## 2023-06-15 NOTE — PLAN OF CARE
MD at bedside to discuss results with pt and family. LUCINDA SLOAN.     Denies nausea/pain    Discharge orders noted

## 2023-06-15 NOTE — ANESTHESIA POSTPROCEDURE EVALUATION
Anesthesia Post Evaluation    Patient: Geremias Quijano    Procedure(s) Performed: Procedure(s) (LRB):  COLONOSCOPY (N/A)    Final Anesthesia Type: MAC      Patient location during evaluation: GI PACU  Patient participation: Yes- Able to Participate  Level of consciousness: responds to stimulation  Post-procedure vital signs: reviewed and stable  Pain management: adequate  Airway patency: patent    PONV status at discharge: No PONV  Anesthetic complications: no      Cardiovascular status: blood pressure returned to baseline and hemodynamically stable  Respiratory status: unassisted and spontaneous ventilation  Hydration status: euvolemic  Follow-up not needed.          Vitals Value Taken Time   BP  06/15/23 0914   Temp  06/15/23 0914   Pulse  06/15/23 0914   Resp  06/15/23 0914   SpO2  06/15/23 0914         No case tracking events are documented in the log.      Pain/Jeremías Score: No data recorded

## 2023-06-15 NOTE — H&P
PRE PROCEDURE H&P    Patient Name: Geremias Quijano  MRN: [id number]  : 1950  Date of Procedure:  6/15/2023  Referring Physician: Martínze White MD  Primary Physician: Martínez Wihte MD  Procedure Physician: Mirta Russo MD       Planned Procedure: Colonoscopy  Diagnosis: previous adenomatous polyp  Chief Complaint: Same as above    HPI: Patient is an 72 y.o. male is here for the above.     Last colonoscopy:   Family history: neg   Anticoagulation: none     Past Medical History:   Past Medical History:   Diagnosis Date    History of colon polyps     HTN (hypertension)     Hyperlipemia         Past Surgical History:  Past Surgical History:   Procedure Laterality Date    COLONOSCOPY N/A 2015    Procedure: COLONOSCOPY;  Surgeon: Layo Hernandez III, MD;  Location: [facility] ENDO;  Service: Endoscopy;  Laterality: N/A;    COLONOSCOPY N/A 2018    Procedure: COLONOSCOPY;  Surgeon: Layo Hernandez III, MD;  Location: [facility] ENDO;  Service: Endoscopy;  Laterality: N/A;    NASAL SEPTUM SURGERY          Home Medications:  Prior to Admission medications    Medication Sig Start Date End Date Taking? Authorizing Provider   atorvastatin (LIPITOR) 20 MG tablet TAKE 1 TABLET BY MOUTH ONCE DAILY IN THE EVENING 3/7/23  Yes Martínez White MD   olmesartan (BENICAR) 40 MG tablet Take 1 tablet by mouth once daily 10/25/22  Yes Martínez White MD   ibuprofen (ADVIL,MOTRIN) 800 MG tablet Take 1 tablet (800 mg total) by mouth 3 (three) times daily as needed for Pain. 19   Jean Mejía NP        Allergies:  Review of patient's allergies indicates:  No Known Allergies     Social History:   Social History     Socioeconomic History    Marital status:    Tobacco Use    Smoking status: Never    Smokeless tobacco: Never   Substance and Sexual Activity    Alcohol use: No    Drug use: No    Sexual activity: Yes     Partners: Female       Family History:  History reviewed. No pertinent family history.    ROS: No  "acute cardiac events, no acute respiratory complaints.     Physical Exam (all patients):    /72 (BP Location: Left arm, Patient Position: Lying)   Pulse 68   Temp 98.4 °F (36.9 °C) (Tympanic)   Resp 16   Ht 5' 11" (1.803 m)   Wt 102.1 kg (225 lb)   SpO2 95%   BMI 31.38 kg/m²   Lungs: Clear to auscultation bilaterally, respirations unlabored  Heart: Regular rate and rhythm, S1 and S2 normal, no obvious murmurs  Abdomen:         Soft, non-tender, bowel sounds normal, no masses, no organomegaly    Lab Results   Component Value Date    WBC 6.84 12/07/2022    MCV 90 12/07/2022    RDW 12.4 12/07/2022     12/07/2022    GLU 93 05/29/2023    BUN 26 (H) 05/29/2023     05/29/2023    K 4.9 05/29/2023     05/29/2023        SEDATION PLAN: per anesthesia      History reviewed, vital signs satisfactory, cardiopulmonary status satisfactory, sedation options, risks and plans have been discussed with the patient  All their questions were answered and the patient agrees to the sedation procedures as planned and the patient is deemed an appropriate candidate for the sedation as planned.    Procedure explained to patient, informed consent obtained and placed in chart.    Mirta Russo  6/15/2023  7:58 AM    "

## 2023-06-15 NOTE — TRANSFER OF CARE
"Anesthesia Transfer of Care Note    Patient: Geremias Quijano    Procedure(s) Performed: Procedure(s) (LRB):  COLONOSCOPY (N/A)    Patient location: PACU    Anesthesia Type: MAC    Transport from OR: Transported from OR on room air with adequate spontaneous ventilation    Post pain: adequate analgesia    Post assessment: no apparent anesthetic complications    Post vital signs: stable    Level of consciousness: responds to stimulation    Nausea/Vomiting: no nausea/vomiting    Complications: none    Transfer of care protocol was followed      Last vitals:   Visit Vitals  /72 (BP Location: Left arm, Patient Position: Lying)   Pulse 68   Temp 36.9 °C (98.4 °F) (Tympanic)   Resp 16   Ht 5' 11" (1.803 m)   Wt 102.1 kg (225 lb)   SpO2 95%   BMI 31.38 kg/m²     "

## 2023-06-15 NOTE — PROVATION PATIENT INSTRUCTIONS
Discharge Summary/Instructions after an Endoscopic Procedure  Patient Name: Geremias Quijano  Patient MRN: [id number]  Patient YOB: 1950  Thursday, Radha 15, 2023 Mirta Russo MD  Dear patient,  As a result of recent federal legislation (The Federal Cures Act), you may   receive lab or pathology results from your procedure in your [facility]   account before your physician is able to contact you. Your physician or   their representative will relay the results to you with their   recommendations at their soonest availability.  Thank you,  RESTRICTIONS:  During your procedure today, you received medications for sedation.  These   medications may affect your judgment, balance and coordination.  Therefore,   for 24 hours, you have the following restrictions:   - DO NOT drive a car, operate machinery, make legal/financial decisions,   sign important papers or drink alcohol.    ACTIVITY:  Today: no heavy lifting, straining or running due to procedural   sedation/anesthesia.  The following day: return to full activity including work.  DIET:  Eat and drink normally unless instructed otherwise.     TREATMENT FOR COMMON SIDE EFFECTS:  - Mild abdominal pain, nausea, belching, bloating or excessive gas:  rest,   eat lightly and use a heating pad.  - Sore Throat: treat with throat lozenges and/or gargle with warm salt   water.  - Because air was used during the procedure, expelling large amounts of air   from your rectum or belching is normal.  - If a bowel prep was taken, you may not have a bowel movement for 1-3 days.    This is normal.  SYMPTOMS TO WATCH FOR AND REPORT TO YOUR PHYSICIAN:  1. Abdominal pain or bloating, other than gas cramps.  2. Chest pain.  3. Back pain.  4. Signs of infection such as: chills or fever occurring within 24 hours   after the procedure.  5. Rectal bleeding, which would show as bright red, maroon, or black stools.   (A tablespoon of blood from the rectum is not serious, especially if    hemorrhoids are present.)  6. Vomiting.  7. Weakness or dizziness.  GO DIRECTLY TO THE NEAREST EMERGENCY ROOM IF YOU HAVE ANY OF THE FOLLOWING:      Difficulty breathing              Chills and/or fever over 101 F   Persistent vomiting and/or vomiting blood   Severe abdominal pain   Severe chest pain   Black, tarry stools   Bleeding- more than one tablespoon   Any other symptom or condition that you feel may need urgent attention  Your doctor recommends these additional instructions:  If any biopsies were taken, your doctors clinic will contact you in 1 to 2   weeks with any results.  - Patient has a contact number available for emergencies.  The signs and   symptoms of potential delayed complications were discussed with the   patient.  Return to normal activities tomorrow.  Written discharge   instructions were provided to the patient.   - Discharge patient to home (via wheelchair).   - Resume previous diet today.   - Continue present medications.   - Await pathology results.   - Repeat colonoscopy in 5 years for surveillance.  For questions, problems or results please call your physician Mirta Russo MD at Work:  [phone]  If you have any questions about the above instructions, call the GI   department at [phone] or call the endoscopy unit at [phone]   from 7am until 3 pm.  [facility] [place], EMERGENCY ROOM PHONE NUMBER:   [phone]  IF A COMPLICATION OR EMERGENCY SITUATION ARISES AND YOU ARE UNABLE TO REACH   YOUR PHYSICIAN - GO DIRECTLY TO THE EMERGENCY ROOM.  I have read or have had read to me these discharge instructions for my   procedure and have received a written copy.  I understand these   instructions and will follow-up with my physician if I have any questions.     __________________________________       _____________________________________  Nurse Signature                                          Patient/Designated   Responsible Party Signature  Mirta  MD Mirta Russo MD  6/15/2023 9:14:05 AM  This report has been verified and signed electronically.  Dear patient,  As a result of recent federal legislation (The Federal Cures Act), you may   receive lab or pathology results from your procedure in your [facility]   account before your physician is able to contact you. Your physician or   their representative will relay the results to you with their   recommendations at their soonest availability.  Thank you,  PROVATION

## 2023-06-15 NOTE — ANESTHESIA PREPROCEDURE EVALUATION
06/15/2023  Geremias Quijano is a 72 y.o., male.    Patient Active Problem List   Diagnosis    HTN (hypertension)    Hyperlipemia    History of colon polyps    Decreased ROM of right shoulder    Right shoulder pain    Decreased ROM of neck     Past Surgical History:   Procedure Laterality Date    COLONOSCOPY N/A 11/13/2015    Procedure: COLONOSCOPY;  Surgeon: Layo Hernandez III, MD;  Location: [facility] ENDO;  Service: Endoscopy;  Laterality: N/A;    COLONOSCOPY N/A 8/29/2018    Procedure: COLONOSCOPY;  Surgeon: Layo Hernandez III, MD;  Location: [facility] ENDO;  Service: Endoscopy;  Laterality: N/A;    NASAL SEPTUM SURGERY       No results found for this or any previous visit.  Results for orders placed or performed in visit on 06/11/18   IN OFFICE EKG 12-LEAD (to [facility])    Collection Time: 06/11/18  3:36 PM    Narrative    Test Reason : R06.02,  Blood Pressure :mmHG  Vent. Rate : 078 BPM     Atrial Rate : 078 BPM     P-R Int : 162 ms          QRS Dur : 106 ms      QT Int : 396 ms       P-R-T Axes : 066 003 032 degrees     QTc Int : 451 ms    Normal sinus rhythm  Possible Left atrial enlargement  Incomplete right bundle branch block  Borderline Abnormal ECG  No previous ECGs available  Confirmed by JAMES CHIN, FAUSTINO IVERSON ([id number]) on 6/13/2018 12:43:32 PM    Referred By: JEROMY LEE           Confirmed By:FAUSTINO RAMIREZ MD           Chemistry        Component Value Date/Time     05/29/2023 0818    K 4.9 05/29/2023 0818     05/29/2023 0818    CO2 26 05/29/2023 0818    BUN 26 (H) 05/29/2023 0818    CREATININE 1.2 05/29/2023 0818    GLU 93 05/29/2023 0818        Component Value Date/Time    CALCIUM 9.6 05/29/2023 0818    ALKPHOS 119 12/07/2022 0817    AST 18 12/07/2022 0817    ALT 22 12/07/2022 0817    BILITOT 0.6 12/07/2022 0817    ESTGFRAFRICA >60.0 06/08/2022 0826    EGFRNONAA >60.0 06/08/2022  0826        Lab Results   Component Value Date    WBC 6.84 12/07/2022    HGB 16.0 12/07/2022    HCT 50.1 12/07/2022    MCV 90 12/07/2022     12/07/2022       Pre-op Assessment    I have reviewed the Patient Summary Reports.     I have reviewed the Nursing Notes. I have reviewed the NPO Status.   I have reviewed the Medications.     Review of Systems  Anesthesia Hx:  Denies Family Hx of Anesthesia complications.   Denies Personal Hx of Anesthesia complications.   Cardiovascular:   Hypertension        Physical Exam  General: Well nourished, Cooperative, Alert and Oriented    Airway:  Mallampati: II   Mouth Opening: Normal  TM Distance: Normal  Tongue: Normal  Neck ROM: Normal ROM    Dental:  Intact        Anesthesia Plan  Type of Anesthesia, risks & benefits discussed:    Anesthesia Type: MAC, Gen Natural Airway  Intra-op Monitoring Plan: Standard ASA Monitors  Post Op Pain Control Plan: multimodal analgesia  Induction:  IV  Informed Consent: Informed consent signed with the Patient and all parties understand the risks and agree with anesthesia plan.  All questions answered.   ASA Score: 2  Day of Surgery Review of History & Physical: H&P Update referred to the surgeon/provider.    Ready For Surgery From Anesthesia Perspective.     .

## 2023-06-16 VITALS
SYSTOLIC BLOOD PRESSURE: 122 MMHG | RESPIRATION RATE: 16 BRPM | HEART RATE: 60 BPM | WEIGHT: 225 LBS | BODY MASS INDEX: 31.5 KG/M2 | TEMPERATURE: 98 F | OXYGEN SATURATION: 97 % | DIASTOLIC BLOOD PRESSURE: 65 MMHG | HEIGHT: 71 IN

## 2023-06-19 ENCOUNTER — PATIENT MESSAGE (OUTPATIENT)
Dept: GASTROENTEROLOGY | Facility: CLINIC | Age: 73
End: 2023-06-19
Payer: MEDICARE

## 2023-06-19 LAB
FINAL PATHOLOGIC DIAGNOSIS: NORMAL
GROSS: NORMAL
Lab: NORMAL
MICROSCOPIC EXAM: NORMAL

## 2023-07-23 RX ORDER — OLMESARTAN MEDOXOMIL 40 MG/1
TABLET ORAL
Qty: 90 TABLET | Refills: 3 | Status: SHIPPED | OUTPATIENT
Start: 2023-07-23

## 2023-07-23 NOTE — TELEPHONE ENCOUNTER
Refill Decision Note   Geremias Quijano  is requesting a refill authorization.  Brief Assessment and Rationale for Refill:  Approve     Medication Therapy Plan:         Comments:     Note composed:2:46 PM 07/23/2023             Appointments     Last Visit   6/2/2023 Martínez White MD   Next Visit   12/11/2023 Martínez White MD

## 2023-07-23 NOTE — TELEPHONE ENCOUNTER
No care due was identified.  [facility] Embedded Care Due Messages. Reference number: [id number].   7/23/2023 1:28:25 PM CDT

## 2023-12-05 ENCOUNTER — LAB VISIT (OUTPATIENT)
Dept: LAB | Facility: HOSPITAL | Age: 73
End: 2023-12-05
Attending: INTERNAL MEDICINE
Payer: MEDICARE

## 2023-12-05 DIAGNOSIS — I10 PRIMARY HYPERTENSION: ICD-10-CM

## 2023-12-05 DIAGNOSIS — Z12.5 PROSTATE CANCER SCREENING: ICD-10-CM

## 2023-12-05 LAB
ALBUMIN SERPL BCP-MCNC: 3.8 G/DL (ref 3.5–5.2)
ALP SERPL-CCNC: 120 U/L (ref 55–135)
ALT SERPL W/O P-5'-P-CCNC: 20 U/L (ref 10–44)
ANION GAP SERPL CALC-SCNC: 13 MMOL/L (ref 8–16)
AST SERPL-CCNC: 18 U/L (ref 10–40)
BASOPHILS # BLD AUTO: 0.08 K/UL (ref 0–0.2)
BASOPHILS NFR BLD: 1 % (ref 0–1.9)
BILIRUB SERPL-MCNC: 0.9 MG/DL (ref 0.1–1)
BUN SERPL-MCNC: 25 MG/DL (ref 8–23)
CALCIUM SERPL-MCNC: 10.2 MG/DL (ref 8.7–10.5)
CHLORIDE SERPL-SCNC: 105 MMOL/L (ref 95–110)
CHOLEST SERPL-MCNC: 206 MG/DL (ref 120–199)
CHOLEST/HDLC SERPL: 3.5 {RATIO} (ref 2–5)
CO2 SERPL-SCNC: 26 MMOL/L (ref 23–29)
COMPLEXED PSA SERPL-MCNC: 2.1 NG/ML (ref 0–4)
CREAT SERPL-MCNC: 1.1 MG/DL (ref 0.5–1.4)
DIFFERENTIAL METHOD: NORMAL
EOSINOPHIL # BLD AUTO: 0.1 K/UL (ref 0–0.5)
EOSINOPHIL NFR BLD: 1.4 % (ref 0–8)
ERYTHROCYTE [DISTWIDTH] IN BLOOD BY AUTOMATED COUNT: 12.9 % (ref 11.5–14.5)
EST. GFR  (NO RACE VARIABLE): >60 ML/MIN/1.73 M^2
GLUCOSE SERPL-MCNC: 90 MG/DL (ref 70–110)
HCT VFR BLD AUTO: 49.4 % (ref 40–54)
HDLC SERPL-MCNC: 59 MG/DL (ref 40–75)
HDLC SERPL: 28.6 % (ref 20–50)
HGB BLD-MCNC: 16.6 G/DL (ref 14–18)
IMM GRANULOCYTES # BLD AUTO: 0.03 K/UL (ref 0–0.04)
IMM GRANULOCYTES NFR BLD AUTO: 0.4 % (ref 0–0.5)
LDLC SERPL CALC-MCNC: 134.6 MG/DL (ref 63–159)
LYMPHOCYTES # BLD AUTO: 1.9 K/UL (ref 1–4.8)
LYMPHOCYTES NFR BLD: 24.1 % (ref 18–48)
MCH RBC QN AUTO: 29 PG (ref 27–31)
MCHC RBC AUTO-ENTMCNC: 33.6 G/DL (ref 32–36)
MCV RBC AUTO: 86 FL (ref 82–98)
MONOCYTES # BLD AUTO: 0.7 K/UL (ref 0.3–1)
MONOCYTES NFR BLD: 9.1 % (ref 4–15)
NEUTROPHILS # BLD AUTO: 5.1 K/UL (ref 1.8–7.7)
NEUTROPHILS NFR BLD: 64 % (ref 38–73)
NONHDLC SERPL-MCNC: 147 MG/DL
NRBC BLD-RTO: 0 /100 WBC
PLATELET # BLD AUTO: 270 K/UL (ref 150–450)
PMV BLD AUTO: 10.1 FL (ref 9.2–12.9)
POTASSIUM SERPL-SCNC: 5.3 MMOL/L (ref 3.5–5.1)
PROT SERPL-MCNC: 7.4 G/DL (ref 6–8.4)
RBC # BLD AUTO: 5.72 M/UL (ref 4.6–6.2)
SODIUM SERPL-SCNC: 144 MMOL/L (ref 136–145)
TRIGL SERPL-MCNC: 62 MG/DL (ref 30–150)
TSH SERPL DL<=0.005 MIU/L-ACNC: 1.24 UIU/ML (ref 0.4–4)
WBC # BLD AUTO: 8.01 K/UL (ref 3.9–12.7)

## 2023-12-05 PROCEDURE — 84153 ASSAY OF PSA TOTAL: CPT | Performed by: INTERNAL MEDICINE

## 2023-12-05 PROCEDURE — 80053 COMPREHEN METABOLIC PANEL: CPT | Performed by: INTERNAL MEDICINE

## 2023-12-05 PROCEDURE — 84443 ASSAY THYROID STIM HORMONE: CPT | Performed by: INTERNAL MEDICINE

## 2023-12-05 PROCEDURE — 85025 COMPLETE CBC W/AUTO DIFF WBC: CPT | Performed by: INTERNAL MEDICINE

## 2023-12-05 PROCEDURE — 80061 LIPID PANEL: CPT | Performed by: INTERNAL MEDICINE

## 2023-12-05 PROCEDURE — 36415 COLL VENOUS BLD VENIPUNCTURE: CPT | Mod: PO | Performed by: INTERNAL MEDICINE

## 2023-12-11 ENCOUNTER — OFFICE VISIT (OUTPATIENT)
Dept: INTERNAL MEDICINE | Facility: CLINIC | Age: 73
End: 2023-12-11
Payer: MEDICARE

## 2023-12-11 VITALS
OXYGEN SATURATION: 98 % | BODY MASS INDEX: 32.01 KG/M2 | WEIGHT: 228.63 LBS | DIASTOLIC BLOOD PRESSURE: 66 MMHG | HEIGHT: 71 IN | HEART RATE: 65 BPM | SYSTOLIC BLOOD PRESSURE: 122 MMHG

## 2023-12-11 DIAGNOSIS — I10 PRIMARY HYPERTENSION: ICD-10-CM

## 2023-12-11 DIAGNOSIS — E78.00 PURE HYPERCHOLESTEROLEMIA: ICD-10-CM

## 2023-12-11 DIAGNOSIS — Z86.010 HISTORY OF COLON POLYPS: ICD-10-CM

## 2023-12-11 DIAGNOSIS — Z00.00 ROUTINE GENERAL MEDICAL EXAMINATION AT A HEALTH CARE FACILITY: Primary | ICD-10-CM

## 2023-12-11 PROCEDURE — 3288F PR FALLS RISK ASSESSMENT DOCUMENTED: ICD-10-PCS | Mod: CPTII,S$GLB,, | Performed by: INTERNAL MEDICINE

## 2023-12-11 PROCEDURE — 4010F PR ACE/ARB THEARPY RXD/TAKEN: ICD-10-PCS | Mod: CPTII,S$GLB,, | Performed by: INTERNAL MEDICINE

## 2023-12-11 PROCEDURE — 3078F DIAST BP <80 MM HG: CPT | Mod: CPTII,S$GLB,, | Performed by: INTERNAL MEDICINE

## 2023-12-11 PROCEDURE — 3074F PR MOST RECENT SYSTOLIC BLOOD PRESSURE < 130 MM HG: ICD-10-PCS | Mod: CPTII,S$GLB,, | Performed by: INTERNAL MEDICINE

## 2023-12-11 PROCEDURE — 99999 PR PBB SHADOW E&M-EST. PATIENT-LVL III: ICD-10-PCS | Mod: PBBFAC,,, | Performed by: INTERNAL MEDICINE

## 2023-12-11 PROCEDURE — 1126F PR PAIN SEVERITY QUANTIFIED, NO PAIN PRESENT: ICD-10-PCS | Mod: CPTII,S$GLB,, | Performed by: INTERNAL MEDICINE

## 2023-12-11 PROCEDURE — 3288F FALL RISK ASSESSMENT DOCD: CPT | Mod: CPTII,S$GLB,, | Performed by: INTERNAL MEDICINE

## 2023-12-11 PROCEDURE — 1159F PR MEDICATION LIST DOCUMENTED IN MEDICAL RECORD: ICD-10-PCS | Mod: CPTII,S$GLB,, | Performed by: INTERNAL MEDICINE

## 2023-12-11 PROCEDURE — 3074F SYST BP LT 130 MM HG: CPT | Mod: CPTII,S$GLB,, | Performed by: INTERNAL MEDICINE

## 2023-12-11 PROCEDURE — 99215 OFFICE O/P EST HI 40 MIN: CPT | Mod: S$GLB,,, | Performed by: INTERNAL MEDICINE

## 2023-12-11 PROCEDURE — 1126F AMNT PAIN NOTED NONE PRSNT: CPT | Mod: CPTII,S$GLB,, | Performed by: INTERNAL MEDICINE

## 2023-12-11 PROCEDURE — 99215 PR OFFICE/OUTPT VISIT, EST, LEVL V, 40-54 MIN: ICD-10-PCS | Mod: S$GLB,,, | Performed by: INTERNAL MEDICINE

## 2023-12-11 PROCEDURE — 3008F PR BODY MASS INDEX (BMI) DOCUMENTED: ICD-10-PCS | Mod: CPTII,S$GLB,, | Performed by: INTERNAL MEDICINE

## 2023-12-11 PROCEDURE — 3008F BODY MASS INDEX DOCD: CPT | Mod: CPTII,S$GLB,, | Performed by: INTERNAL MEDICINE

## 2023-12-11 PROCEDURE — 3078F PR MOST RECENT DIASTOLIC BLOOD PRESSURE < 80 MM HG: ICD-10-PCS | Mod: CPTII,S$GLB,, | Performed by: INTERNAL MEDICINE

## 2023-12-11 PROCEDURE — 1101F PT FALLS ASSESS-DOCD LE1/YR: CPT | Mod: CPTII,S$GLB,, | Performed by: INTERNAL MEDICINE

## 2023-12-11 PROCEDURE — 1159F MED LIST DOCD IN RCRD: CPT | Mod: CPTII,S$GLB,, | Performed by: INTERNAL MEDICINE

## 2023-12-11 PROCEDURE — 4010F ACE/ARB THERAPY RXD/TAKEN: CPT | Mod: CPTII,S$GLB,, | Performed by: INTERNAL MEDICINE

## 2023-12-11 PROCEDURE — 1101F PR PT FALLS ASSESS DOC 0-1 FALLS W/OUT INJ PAST YR: ICD-10-PCS | Mod: CPTII,S$GLB,, | Performed by: INTERNAL MEDICINE

## 2023-12-11 PROCEDURE — 99999 PR PBB SHADOW E&M-EST. PATIENT-LVL III: CPT | Mod: PBBFAC,,, | Performed by: INTERNAL MEDICINE

## 2023-12-11 NOTE — PROGRESS NOTES
"HPI:  Patient is a 73-year-old gentleman who comes today for follow-up hypertension, lipids, and for his annual physical exam.  He has been doing well.  He has no reported problems or complaints.  His blood pressure at home has been well controlled      Current MEDS: medcard review, verified and update  Allergies: Per the electronic medical record    Past Medical History:   Diagnosis Date    History of colon polyps     HTN (hypertension)     Hyperlipemia        Past Surgical History:   Procedure Laterality Date    COLONOSCOPY N/A 11/13/2015    Procedure: COLONOSCOPY;  Surgeon: Layo Hernandez III, MD;  Location: [facility] ENDO;  Service: Endoscopy;  Laterality: N/A;    COLONOSCOPY N/A 8/29/2018    Procedure: COLONOSCOPY;  Surgeon: Layo Hernandez III, MD;  Location: [facility] ENDO;  Service: Endoscopy;  Laterality: N/A;    COLONOSCOPY N/A 6/15/2023    Procedure: COLONOSCOPY;  Surgeon: Mirta Russo MD;  Location: [facility];  Service: Endoscopy;  Laterality: N/A;    NASAL SEPTUM SURGERY         SHx: per the electronic medical record    FHx: recorded in the electronic medical record    ROS:    denies any chest pains or shortness of breath. Denies any nausea, vomiting or diarrhea. Denies any fever, chills or sweats. Denies any change in weight, voice, stool, skin or hair. Denies any dysuria, dyspepsia or dysphagia. Denies any change in vision, hearing or headaches. Denies any swollen lymph nodes or loss of memory.    PE:  /66 (BP Location: Left arm, Patient Position: Sitting, BP Method: Large (Manual))   Pulse 65   Ht 5' 11" (1.803 m)   Wt 103.7 kg (228 lb 9.9 oz)   SpO2 98%   BMI 31.89 kg/m²   Gen: Well-developed, well-nourished, male, in no acute distress, oriented x3  HEENT: neck is supple, no adenopathy, carotids 2+ equal without bruits, thyroid exam normal size without nodules.  CHEST: clear to auscultation and percussion  CVS: regular rate and rhythm without significant murmur, gallop, or rubs  ABD: soft, " benign, no rebound no guarding, no distention.  Bowel sounds are normal.     nontender.  No palpable masses.  No organomegaly and no audible bruits.  RECTAL:  Deferred.  EXT: no clubbing, cyanosis, or edema  LYMPH: no cervical, inguinal, or axillary adenopathy  FEET: no loss of sensation.  No ulcers or pressure sores.  NEURO: gait normal.  Cranial nerves II- XII intact. No nystagmus.  Speech normal.   Gross motor and sensory unremarkable.    Lab Results   Component Value Date    WBC 8.01 12/05/2023    HGB 16.6 12/05/2023    HCT 49.4 12/05/2023     12/05/2023    CHOL 206 (H) 12/05/2023    TRIG 62 12/05/2023    HDL 59 12/05/2023    ALT 20 12/05/2023    AST 18 12/05/2023     12/05/2023    K 5.3 (H) 12/05/2023     12/05/2023    CREATININE 1.1 12/05/2023    BUN 25 (H) 12/05/2023    CO2 26 12/05/2023    TSH 1.244 12/05/2023    PSA 2.1 12/05/2023       Impression:  Hypertension and lipids both well controlled on current therapy  Patient Active Problem List   Diagnosis    HTN (hypertension)    Hyperlipemia    History of colon polyps    Decreased ROM of right shoulder    Right shoulder pain    Decreased ROM of neck       Plan:   Orders Placed This Encounter    Lipid Panel    Basic Metabolic Panel     Medications remain the same.  He will be seen again in 6 months with above lab work.  This note is generated with speech recognition software and is subject to transcription error and sound alike phrases that may be missed by proofreading.

## 2024-02-22 RX ORDER — ATORVASTATIN CALCIUM 20 MG/1
TABLET, FILM COATED ORAL
Qty: 90 TABLET | Refills: 3 | Status: SHIPPED | OUTPATIENT
Start: 2024-02-22

## 2024-04-09 RX ORDER — OLMESARTAN MEDOXOMIL 40 MG/1
40 TABLET ORAL DAILY
Qty: 90 TABLET | Refills: 3 | Status: SHIPPED | OUTPATIENT
Start: 2024-04-09

## 2024-04-09 NOTE — TELEPHONE ENCOUNTER
Requested Prescriptions     Pending Prescriptions Disp Refills    olmesartan (BENICAR) 40 MG tablet 90 tablet 3     Sig: Take 1 tablet (40 mg total) by mouth once daily.     LV 12/11/2023  NV 06/26/2024  LF 07/23/2023

## 2024-06-11 ENCOUNTER — LAB VISIT (OUTPATIENT)
Dept: LAB | Facility: HOSPITAL | Age: 74
End: 2024-06-11
Attending: INTERNAL MEDICINE
Payer: MEDICARE

## 2024-06-11 DIAGNOSIS — I10 PRIMARY HYPERTENSION: ICD-10-CM

## 2024-06-11 LAB
ANION GAP SERPL CALC-SCNC: 8 MMOL/L (ref 8–16)
BUN SERPL-MCNC: 18 MG/DL (ref 8–23)
CALCIUM SERPL-MCNC: 9.9 MG/DL (ref 8.7–10.5)
CHLORIDE SERPL-SCNC: 106 MMOL/L (ref 95–110)
CHOLEST SERPL-MCNC: 182 MG/DL (ref 120–199)
CHOLEST/HDLC SERPL: 4 {RATIO} (ref 2–5)
CO2 SERPL-SCNC: 26 MMOL/L (ref 23–29)
CREAT SERPL-MCNC: 1.3 MG/DL (ref 0.5–1.4)
EST. GFR  (NO RACE VARIABLE): 58.7 ML/MIN/1.73 M^2
GLUCOSE SERPL-MCNC: 90 MG/DL (ref 70–110)
HDLC SERPL-MCNC: 45 MG/DL (ref 40–75)
HDLC SERPL: 24.7 % (ref 20–50)
LDLC SERPL CALC-MCNC: 122.2 MG/DL (ref 63–159)
NONHDLC SERPL-MCNC: 137 MG/DL
POTASSIUM SERPL-SCNC: 4.8 MMOL/L (ref 3.5–5.1)
SODIUM SERPL-SCNC: 140 MMOL/L (ref 136–145)
TRIGL SERPL-MCNC: 74 MG/DL (ref 30–150)

## 2024-06-11 PROCEDURE — 80061 LIPID PANEL: CPT | Performed by: INTERNAL MEDICINE

## 2024-06-11 PROCEDURE — 36415 COLL VENOUS BLD VENIPUNCTURE: CPT | Mod: PO | Performed by: INTERNAL MEDICINE

## 2024-06-11 PROCEDURE — 80048 BASIC METABOLIC PNL TOTAL CA: CPT | Performed by: INTERNAL MEDICINE

## 2024-06-26 ENCOUNTER — OFFICE VISIT (OUTPATIENT)
Dept: INTERNAL MEDICINE | Facility: CLINIC | Age: 74
End: 2024-06-26
Payer: MEDICARE

## 2024-06-26 VITALS
HEART RATE: 59 BPM | HEIGHT: 71 IN | OXYGEN SATURATION: 97 % | DIASTOLIC BLOOD PRESSURE: 64 MMHG | BODY MASS INDEX: 31.95 KG/M2 | SYSTOLIC BLOOD PRESSURE: 134 MMHG | WEIGHT: 228.19 LBS

## 2024-06-26 DIAGNOSIS — I10 PRIMARY HYPERTENSION: ICD-10-CM

## 2024-06-26 DIAGNOSIS — E78.00 PURE HYPERCHOLESTEROLEMIA: Primary | ICD-10-CM

## 2024-06-26 DIAGNOSIS — Z86.010 HISTORY OF COLON POLYPS: ICD-10-CM

## 2024-06-26 DIAGNOSIS — Z12.5 PROSTATE CANCER SCREENING: ICD-10-CM

## 2024-06-26 PROCEDURE — 1159F MED LIST DOCD IN RCRD: CPT | Mod: CPTII,S$GLB,, | Performed by: INTERNAL MEDICINE

## 2024-06-26 PROCEDURE — 99999 PR PBB SHADOW E&M-EST. PATIENT-LVL III: CPT | Mod: PBBFAC,,, | Performed by: INTERNAL MEDICINE

## 2024-06-26 PROCEDURE — 4010F ACE/ARB THERAPY RXD/TAKEN: CPT | Mod: CPTII,S$GLB,, | Performed by: INTERNAL MEDICINE

## 2024-06-26 PROCEDURE — 3078F DIAST BP <80 MM HG: CPT | Mod: CPTII,S$GLB,, | Performed by: INTERNAL MEDICINE

## 2024-06-26 PROCEDURE — 99214 OFFICE O/P EST MOD 30 MIN: CPT | Mod: S$GLB,,, | Performed by: INTERNAL MEDICINE

## 2024-06-26 PROCEDURE — 1126F AMNT PAIN NOTED NONE PRSNT: CPT | Mod: CPTII,S$GLB,, | Performed by: INTERNAL MEDICINE

## 2024-06-26 PROCEDURE — 1101F PT FALLS ASSESS-DOCD LE1/YR: CPT | Mod: CPTII,S$GLB,, | Performed by: INTERNAL MEDICINE

## 2024-06-26 PROCEDURE — 3288F FALL RISK ASSESSMENT DOCD: CPT | Mod: CPTII,S$GLB,, | Performed by: INTERNAL MEDICINE

## 2024-06-26 PROCEDURE — 3075F SYST BP GE 130 - 139MM HG: CPT | Mod: CPTII,S$GLB,, | Performed by: INTERNAL MEDICINE

## 2024-06-26 PROCEDURE — 3008F BODY MASS INDEX DOCD: CPT | Mod: CPTII,S$GLB,, | Performed by: INTERNAL MEDICINE

## 2024-06-26 PROCEDURE — G2211 COMPLEX E/M VISIT ADD ON: HCPCS | Mod: S$GLB,,, | Performed by: INTERNAL MEDICINE

## 2024-06-26 NOTE — PROGRESS NOTES
"HPI:  Patient is a 73-year-old man who comes in today for follow-up of his hypertension and lipids.  He continues do very well.  He exercises regularly.  His blood pressure home has been excellent.  He denies any other problems or complaints.    Current meds have been verified and updated per the EMR  Exam:/64 (BP Location: Right arm)   Pulse (!) 59   Ht 5' 11" (1.803 m)   Wt 103.5 kg (228 lb 2.8 oz)   SpO2 97%   BMI 31.82 kg/m²   Carotids 2+ equal without bruits, neck is supple without adenopathy  Chest clear  Cardiovascular regular rate and rhythm without murmur gallop or rub  Extremities without edema    Lab Results   Component Value Date    WBC 8.01 12/05/2023    HGB 16.6 12/05/2023    HCT 49.4 12/05/2023     12/05/2023    CHOL 182 06/11/2024    TRIG 74 06/11/2024    HDL 45 06/11/2024    ALT 20 12/05/2023    AST 18 12/05/2023     06/11/2024    K 4.8 06/11/2024     06/11/2024    CREATININE 1.3 06/11/2024    BUN 18 06/11/2024    CO2 26 06/11/2024    TSH 1.244 12/05/2023    PSA 2.1 12/05/2023       Impression:  Hypertension and lipids both very well controlled on current therapy  Patient Active Problem List   Diagnosis    HTN (hypertension)    Hyperlipemia    History of colon polyps    Decreased ROM of right shoulder    Right shoulder pain    Decreased ROM of neck       Plan:  Orders Placed This Encounter    Comprehensive Metabolic Panel    Lipid Panel    TSH    PSA, Screening     Patient will see me again in 6 months with above lab work.  Medications remain the same.    This note is generated with speech recognition software and is subject to transcription error and sound alike phrases that may be missed by proofreading.      "

## 2024-10-17 NOTE — ANESTHESIA RELEASE NOTE
Anesthesia Release from PACU Note    Patient: Geremias Quijano    Procedure(s) Performed: Procedure(s) (LRB):  COLONOSCOPY (N/A)    Anesthesia type: MAC    Post pain: Adequate analgesia    Post assessment: no apparent anesthetic complications, tolerated procedure well and no evidence of recall    Last Vitals:   Visit Vitals  BP 95/62   Pulse 68   Temp 36.5 °C (97.7 °F) (Oral)   Resp 14   Ht 6' (1.829 m)   Wt 100.2 kg (221 lb)   SpO2 95%   BMI 29.97 kg/m²       Post vital signs: stable    Level of consciousness: awake, alert  and oriented    Nausea/Vomiting: no nausea/no vomiting    Complications: none    Airway Patency: patent    Respiratory: unassisted, spontaneous ventilation, room air    Cardiovascular: stable and blood pressure at baseline    Hydration: euvolemic   [FreeTextEntry1] : 52 Y OLD FEM WITH PMX OF HTN = STABLE ON METOPROLOL/VALSARTAN DYSLIPIDEMIA = ATORVASTATIN 20 MG AND LABS ORDERED INFLUENZA VACCINE TODAY  RTO 3 M FOR CPE

## 2024-10-23 RX ORDER — OLMESARTAN MEDOXOMIL 40 MG/1
40 TABLET ORAL DAILY
Qty: 90 TABLET | Refills: 3 | Status: SHIPPED | OUTPATIENT
Start: 2024-10-23

## 2024-10-23 NOTE — TELEPHONE ENCOUNTER
Requested Prescriptions     Pending Prescriptions Disp Refills    olmesartan (BENICAR) 40 MG tablet 90 tablet 3     Sig: Take 1 tablet (40 mg total) by mouth once daily.     LV 06/26/2024   LF 04/09/2024

## 2024-12-16 ENCOUNTER — LAB VISIT (OUTPATIENT)
Dept: LAB | Facility: HOSPITAL | Age: 74
End: 2024-12-16
Attending: INTERNAL MEDICINE
Payer: MEDICARE

## 2024-12-16 DIAGNOSIS — I10 PRIMARY HYPERTENSION: ICD-10-CM

## 2024-12-16 DIAGNOSIS — Z12.5 PROSTATE CANCER SCREENING: ICD-10-CM

## 2024-12-16 LAB
ALBUMIN SERPL BCP-MCNC: 3.6 G/DL (ref 3.5–5.2)
ALP SERPL-CCNC: 110 U/L (ref 40–150)
ALT SERPL W/O P-5'-P-CCNC: 20 U/L (ref 10–44)
ANION GAP SERPL CALC-SCNC: 8 MMOL/L (ref 8–16)
AST SERPL-CCNC: 19 U/L (ref 10–40)
BILIRUB SERPL-MCNC: 0.5 MG/DL (ref 0.1–1)
BUN SERPL-MCNC: 18 MG/DL (ref 8–23)
CALCIUM SERPL-MCNC: 9.9 MG/DL (ref 8.7–10.5)
CHLORIDE SERPL-SCNC: 110 MMOL/L (ref 95–110)
CHOLEST SERPL-MCNC: 178 MG/DL (ref 120–199)
CHOLEST/HDLC SERPL: 3.8 {RATIO} (ref 2–5)
CO2 SERPL-SCNC: 24 MMOL/L (ref 23–29)
COMPLEXED PSA SERPL-MCNC: 2.4 NG/ML (ref 0–4)
CREAT SERPL-MCNC: 1.2 MG/DL (ref 0.5–1.4)
EST. GFR  (NO RACE VARIABLE): >60 ML/MIN/1.73 M^2
GLUCOSE SERPL-MCNC: 85 MG/DL (ref 70–110)
HDLC SERPL-MCNC: 47 MG/DL (ref 40–75)
HDLC SERPL: 26.4 % (ref 20–50)
LDLC SERPL CALC-MCNC: 118.6 MG/DL (ref 63–159)
NONHDLC SERPL-MCNC: 131 MG/DL
POTASSIUM SERPL-SCNC: 5.1 MMOL/L (ref 3.5–5.1)
PROT SERPL-MCNC: 6.9 G/DL (ref 6–8.4)
SODIUM SERPL-SCNC: 142 MMOL/L (ref 136–145)
TRIGL SERPL-MCNC: 62 MG/DL (ref 30–150)
TSH SERPL DL<=0.005 MIU/L-ACNC: 1.01 UIU/ML (ref 0.4–4)

## 2024-12-16 PROCEDURE — 80061 LIPID PANEL: CPT | Performed by: INTERNAL MEDICINE

## 2024-12-16 PROCEDURE — 84443 ASSAY THYROID STIM HORMONE: CPT | Performed by: INTERNAL MEDICINE

## 2024-12-16 PROCEDURE — 80053 COMPREHEN METABOLIC PANEL: CPT | Performed by: INTERNAL MEDICINE

## 2024-12-16 PROCEDURE — 36415 COLL VENOUS BLD VENIPUNCTURE: CPT | Mod: PO | Performed by: INTERNAL MEDICINE

## 2024-12-16 PROCEDURE — 84153 ASSAY OF PSA TOTAL: CPT | Performed by: INTERNAL MEDICINE

## 2024-12-19 ENCOUNTER — OFFICE VISIT (OUTPATIENT)
Dept: INTERNAL MEDICINE | Facility: CLINIC | Age: 74
End: 2024-12-19
Payer: MEDICARE

## 2024-12-19 VITALS
HEIGHT: 71 IN | TEMPERATURE: 97 F | HEART RATE: 58 BPM | BODY MASS INDEX: 29.72 KG/M2 | DIASTOLIC BLOOD PRESSURE: 72 MMHG | SYSTOLIC BLOOD PRESSURE: 136 MMHG | WEIGHT: 212.31 LBS | OXYGEN SATURATION: 99 %

## 2024-12-19 DIAGNOSIS — E78.00 PURE HYPERCHOLESTEROLEMIA: ICD-10-CM

## 2024-12-19 DIAGNOSIS — Z00.00 ROUTINE GENERAL MEDICAL EXAMINATION AT A HEALTH CARE FACILITY: Primary | ICD-10-CM

## 2024-12-19 DIAGNOSIS — I10 PRIMARY HYPERTENSION: ICD-10-CM

## 2024-12-19 DIAGNOSIS — Z86.0100 HISTORY OF COLON POLYPS: ICD-10-CM

## 2024-12-19 PROCEDURE — 99999 PR PBB SHADOW E&M-EST. PATIENT-LVL III: CPT | Mod: PBBFAC,,, | Performed by: INTERNAL MEDICINE

## 2024-12-19 NOTE — PROGRESS NOTES
"HPI:  Patient is a 74-year-old gentleman who comes in today for follow-up of his hypertension, lipids, and for his annual physical.  Patient has been doing well.  He has no complaints at all.  His blood pressures been very well controlled      Current MEDS: medcard review, verified and update  Allergies: Per the electronic medical record    Past Medical History:   Diagnosis Date    History of colon polyps     HTN (hypertension)     Hyperlipemia        Past Surgical History:   Procedure Laterality Date    COLONOSCOPY N/A 11/13/2015    Procedure: COLONOSCOPY;  Surgeon: Layo Hernandez III, MD;  Location: [facility] ENDO;  Service: Endoscopy;  Laterality: N/A;    COLONOSCOPY N/A 8/29/2018    Procedure: COLONOSCOPY;  Surgeon: Layo Hernandez III, MD;  Location: [facility] ENDO;  Service: Endoscopy;  Laterality: N/A;    COLONOSCOPY N/A 6/15/2023    Procedure: COLONOSCOPY;  Surgeon: Mirta Russo MD;  Location: [facility];  Service: Endoscopy;  Laterality: N/A;    NASAL SEPTUM SURGERY         SHx: per the electronic medical record    FHx: recorded in the electronic medical record    ROS:    denies any chest pains or shortness of breath. Denies any nausea, vomiting or diarrhea. Denies any fever, chills or sweats. Denies any change in weight, voice, stool, skin or hair. Denies any dysuria, dyspepsia or dysphagia. Denies any change in vision, hearing or headaches. Denies any swollen lymph nodes or loss of memory.    PE:  /72 (BP Location: Left arm, Patient Position: Sitting)   Pulse (!) 58   Temp 97.1 °F (36.2 °C) (Tympanic)   Ht 5' 11" (1.803 m)   Wt 96.3 kg (212 lb 4.9 oz)   SpO2 99%   BMI 29.61 kg/m²   Gen: Well-developed, well-nourished, male, in no acute distress, oriented x3  HEENT: neck is supple, no adenopathy, carotids 2+ equal without bruits, thyroid exam normal size without nodules.  CHEST: clear to auscultation and percussion  CVS: regular rate and rhythm without significant murmur, gallop, or rubs  ABD: " soft, benign, no rebound no guarding, no distention.  Bowel sounds are normal.     nontender.  No palpable masses.  No organomegaly and no audible bruits.  RECTAL:  Deferred.  EXT: no clubbing, cyanosis, or edema  LYMPH: no cervical, inguinal, or axillary adenopathy  FEET: no loss of sensation.  No ulcers or pressure sores.  NEURO: gait normal.  Cranial nerves II- XII intact. No nystagmus.  Speech normal.   Gross motor and sensory unremarkable.    Lab Results   Component Value Date    WBC 8.01 12/05/2023    HGB 16.6 12/05/2023    HCT 49.4 12/05/2023     12/05/2023    CHOL 178 12/16/2024    TRIG 62 12/16/2024    HDL 47 12/16/2024    ALT 20 12/16/2024    AST 19 12/16/2024     12/16/2024    K 5.1 12/16/2024     12/16/2024    CREATININE 1.2 12/16/2024    BUN 18 12/16/2024    CO2 24 12/16/2024    TSH 1.009 12/16/2024    PSA 2.4 12/16/2024       Impression:  Hypertension lipids, both very well controlled on current meds    Patient Active Problem List   Diagnosis    HTN (hypertension)    Hyperlipemia    History of colon polyps    Decreased ROM of right shoulder    Right shoulder pain    Decreased ROM of neck       Plan:   Orders Placed This Encounter    Lipid Panel    Basic Metabolic Panel     Patient will continue his current medications.  He will be seen again in 6 months with the above lab work  This note is generated with speech recognition software and is subject to transcription error and sound alike phrases that may be missed by proofreading.

## 2025-01-17 RX ORDER — ATORVASTATIN CALCIUM 20 MG/1
TABLET, FILM COATED ORAL
Qty: 90 TABLET | Refills: 1 | Status: SHIPPED | OUTPATIENT
Start: 2025-01-17

## 2025-06-12 ENCOUNTER — LAB VISIT (OUTPATIENT)
Dept: LAB | Facility: HOSPITAL | Age: 75
End: 2025-06-12
Attending: INTERNAL MEDICINE
Payer: MEDICARE

## 2025-06-12 DIAGNOSIS — I10 PRIMARY HYPERTENSION: ICD-10-CM

## 2025-06-12 LAB
ANION GAP (OHS): 7 MMOL/L (ref 8–16)
BUN SERPL-MCNC: 20 MG/DL (ref 8–23)
CALCIUM SERPL-MCNC: 9.1 MG/DL (ref 8.7–10.5)
CHLORIDE SERPL-SCNC: 103 MMOL/L (ref 95–110)
CHOLEST SERPL-MCNC: 168 MG/DL (ref 120–199)
CHOLEST/HDLC SERPL: 3.4 {RATIO} (ref 2–5)
CO2 SERPL-SCNC: 26 MMOL/L (ref 23–29)
CREAT SERPL-MCNC: 1.1 MG/DL (ref 0.5–1.4)
GFR SERPLBLD CREATININE-BSD FMLA CKD-EPI: >60 ML/MIN/1.73/M2
GLUCOSE SERPL-MCNC: 87 MG/DL (ref 70–110)
HDLC SERPL-MCNC: 50 MG/DL (ref 40–75)
HDLC SERPL: 29.8 % (ref 20–50)
LDLC SERPL CALC-MCNC: 103.8 MG/DL (ref 63–159)
NONHDLC SERPL-MCNC: 118 MG/DL
POTASSIUM SERPL-SCNC: 4.9 MMOL/L (ref 3.5–5.1)
SODIUM SERPL-SCNC: 136 MMOL/L (ref 136–145)
TRIGL SERPL-MCNC: 71 MG/DL (ref 30–150)

## 2025-06-12 PROCEDURE — 80061 LIPID PANEL: CPT

## 2025-06-12 PROCEDURE — 80048 BASIC METABOLIC PNL TOTAL CA: CPT

## 2025-06-12 PROCEDURE — 36415 COLL VENOUS BLD VENIPUNCTURE: CPT | Mod: PO

## 2025-06-19 ENCOUNTER — OFFICE VISIT (OUTPATIENT)
Dept: INTERNAL MEDICINE | Facility: CLINIC | Age: 75
End: 2025-06-19
Payer: MEDICARE

## 2025-06-19 VITALS
OXYGEN SATURATION: 97 % | HEIGHT: 71 IN | WEIGHT: 213.88 LBS | TEMPERATURE: 97 F | HEART RATE: 65 BPM | SYSTOLIC BLOOD PRESSURE: 122 MMHG | BODY MASS INDEX: 29.94 KG/M2 | DIASTOLIC BLOOD PRESSURE: 72 MMHG

## 2025-06-19 DIAGNOSIS — E78.00 PURE HYPERCHOLESTEROLEMIA: ICD-10-CM

## 2025-06-19 DIAGNOSIS — I10 PRIMARY HYPERTENSION: Primary | ICD-10-CM

## 2025-06-19 DIAGNOSIS — Z79.899 OTHER LONG TERM (CURRENT) DRUG THERAPY: ICD-10-CM

## 2025-06-19 DIAGNOSIS — B35.3 TINEA PEDIS, UNSPECIFIED LATERALITY: ICD-10-CM

## 2025-06-19 DIAGNOSIS — N40.0 BENIGN PROSTATIC HYPERPLASIA, UNSPECIFIED WHETHER LOWER URINARY TRACT SYMPTOMS PRESENT: ICD-10-CM

## 2025-06-19 PROCEDURE — 99214 OFFICE O/P EST MOD 30 MIN: CPT | Mod: S$GLB,,,

## 2025-06-19 PROCEDURE — G2211 COMPLEX E/M VISIT ADD ON: HCPCS | Mod: S$GLB,,,

## 2025-06-19 PROCEDURE — 99999 PR PBB SHADOW E&M-EST. PATIENT-LVL III: CPT | Mod: PBBFAC,,,

## 2025-06-19 PROCEDURE — 1101F PT FALLS ASSESS-DOCD LE1/YR: CPT | Mod: CPTII,S$GLB,,

## 2025-06-19 PROCEDURE — 3078F DIAST BP <80 MM HG: CPT | Mod: CPTII,S$GLB,,

## 2025-06-19 PROCEDURE — 1126F AMNT PAIN NOTED NONE PRSNT: CPT | Mod: CPTII,S$GLB,,

## 2025-06-19 PROCEDURE — 1159F MED LIST DOCD IN RCRD: CPT | Mod: CPTII,S$GLB,,

## 2025-06-19 PROCEDURE — 3074F SYST BP LT 130 MM HG: CPT | Mod: CPTII,S$GLB,,

## 2025-06-19 PROCEDURE — 4010F ACE/ARB THERAPY RXD/TAKEN: CPT | Mod: CPTII,S$GLB,,

## 2025-06-19 PROCEDURE — 3008F BODY MASS INDEX DOCD: CPT | Mod: CPTII,S$GLB,,

## 2025-06-19 PROCEDURE — 3288F FALL RISK ASSESSMENT DOCD: CPT | Mod: CPTII,S$GLB,,

## 2025-06-19 RX ORDER — CLOTRIMAZOLE 1 %
CREAM (GRAM) TOPICAL 2 TIMES DAILY
Qty: 45 G | Refills: 0 | Status: SHIPPED | OUTPATIENT
Start: 2025-06-19

## 2025-06-19 NOTE — PROGRESS NOTES
Patient ID: Geremias Quijano is a 74 y.o. male.    Chief Complaint: Follow-up    History of Present Illness    CHIEF COMPLAINT:  - Mr. Quijano presents for a routine follow-up visit and to discuss concerns about urinary dribbling and prostate health.    HPI:  Mr. Quijano, a 74-year-old male, reports occasional urinary dribbling. He denies nocturia, stating that he usually does not wake up or at most wakes up once per night to urinate. He expresses concern about his prostate health, particularly in light of recent news about Sunil Padilla's prostate cancer diagnosis. He attends the gym 4-5 times per week and is able to perform his desired activities without limitations. He has a history of hip pain for which he has been evaluated by Dr. Campbell for several years, receiving injections approximately once every 2-2.5 years. He is able to run, walk, and jump despite the hip pain. He also reports current symptoms of athlete's foot, describing it as a ring-like rash on his toes. He has been using an OTC spray to treat it but is seeking additional recommendations.    He denies frequent urination, urgency, or difficulty emptying his bladder completely. He denies any family history of prostate cancer. He denies shortness of breath, chest pain, or pressure in his chest during exercise or daily activities.      ROS:  General: -fever, -chills, -fatigue, -weight gain, -weight loss  Eyes: -vision changes, -redness, -discharge  ENT: -ear pain, -nasal congestion, -sore throat  Cardiovascular: -chest pain, -palpitations, -lower extremity edema  Respiratory: -cough, -shortness of breath  Gastrointestinal: -abdominal pain, -nausea, -vomiting, -diarrhea, -constipation, -blood in stool  Genitourinary: -dysuria, -hematuria, -frequency  Musculoskeletal: +joint pain, -muscle pain  Skin: -rash, -lesion  Neurological: -headache, -dizziness, -numbness, -tingling  Psychiatric: -anxiety, -depression, -sleep difficulty  Male Genitourinary:  +post-urination dribbling         Pmh, Psh, Family Hx, Social Hx updated in Epic Tabs today.         6/19/2025     7:08 AM 6/26/2024     7:42 AM 12/11/2023     7:41 AM 6/2/2023     7:54 AM 5/18/2022     9:04 AM 3/16/2021     9:22 AM 9/9/2019     8:25 AM   Depression Patient Health Questionnaire   Over the last two weeks how often have you been bothered by little interest or pleasure in doing things Not at all Not at all Not at all Not at all Not at all  Not at all  Not at all    Over the last two weeks how often have you been bothered by feeling down, depressed or hopeless Not at all Not at all Not at all Not at all Not at all Not at all  Not at all    PHQ-2 Total Score 0 0 0 0 0 0 0       Data saved with a previous flowsheet row definition       Active Problem List with Overview Notes    Diagnosis Date Noted    Decreased ROM of right shoulder 12/09/2021    Right shoulder pain 12/09/2021    Decreased ROM of neck 12/09/2021    History of colon polyps      Next colonoscopy due November 2018      HTN (hypertension)     Hyperlipemia        Past Medical History:   Diagnosis Date    History of colon polyps     HTN (hypertension)     Hyperlipemia        Past Surgical History:   Procedure Laterality Date    COLONOSCOPY N/A 11/13/2015    Procedure: COLONOSCOPY;  Surgeon: Layo Hernandez III, MD;  Location: [facility];  Service: Endoscopy;  Laterality: N/A;    COLONOSCOPY N/A 8/29/2018    Procedure: COLONOSCOPY;  Surgeon: Layo Hernandez III, MD;  Location: [facility];  Service: Endoscopy;  Laterality: N/A;    COLONOSCOPY N/A 6/15/2023    Procedure: COLONOSCOPY;  Surgeon: Mirta Russo MD;  Location: [facility];  Service: Endoscopy;  Laterality: N/A;    NASAL SEPTUM SURGERY         No family history on file.    Social History     Socioeconomic History    Marital status:    Tobacco Use    Smoking status: Never    Smokeless tobacco: Never   Substance and Sexual Activity    Alcohol use: No    Drug use: No    Sexual  activity: Yes     Partners: Female     Social Drivers of Health     Financial Resource Strain: Low Risk  (6/24/2024)    Overall Financial Resource Strain (CARDIA)     Difficulty of Paying Living Expenses: Not hard at all   Food Insecurity: No Food Insecurity (6/24/2024)    Hunger Vital Sign     Worried About Running Out of Food in the Last Year: Never true     Ran Out of Food in the Last Year: Never true   Transportation Needs: No Transportation Needs (5/18/2022)    PRAPARE - Transportation     Lack of Transportation (Medical): No     Lack of Transportation (Non-Medical): No   Physical Activity: Sufficiently Active (6/24/2024)    Exercise Vital Sign     Days of Exercise per Week: 5 days     Minutes of Exercise per Session: 40 min   Stress: No Stress Concern Present (6/24/2024)    [employer] [place] [employer] - Occupational Stress Questionnaire     Feeling of Stress : Not at all   Housing Stability: Unknown (5/18/2022)    Housing Stability Vital Sign     Unable to Pay for Housing in the Last Year: No     Unstable Housing in the Last Year: No       Medications Ordered Prior to Encounter[1]    Review of patient's allergies indicates:  No Known Allergies    General - Well developed, alert and oriented in NAD  HEENT - normocephalic, no evidence of trauma, sclera white, EOMI  Neck - full range of motion  COR - regular rate and rhythm without murmurs or gallops  Lungs - Clear  Abdomen - soft, non-tender  Ext - no cyanosis or edema     Assessment:     1. Primary hypertension    2. Pure hypercholesterolemia    3. Benign prostatic hyperplasia, unspecified whether lower urinary tract symptoms present    4. Other long term (current) drug therapy    5. Tinea pedis, unspecified laterality        Pertinent Labs:    Chemistry        Component Value Date/Time     06/12/2025 0742     12/16/2024 0735    K 4.9 06/12/2025 0742    K 5.1 12/16/2024 0735     06/12/2025 0742     12/16/2024 0735    CO2 26  "06/12/2025 0742    CO2 24 12/16/2024 0735    BUN 20 06/12/2025 0742    CREATININE 1.1 06/12/2025 0742    GLU 87 06/12/2025 0742    GLU 85 12/16/2024 0735        Component Value Date/Time    CALCIUM 9.1 06/12/2025 0742    CALCIUM 9.9 12/16/2024 0735    ALKPHOS 110 12/16/2024 0735    AST 19 12/16/2024 0735    ALT 20 12/16/2024 0735    BILITOT 0.5 12/16/2024 0735    ESTGFRAFRICA >60.0 06/08/2022 0826    EGFRNONAA >60.0 06/08/2022 0826          Lab Results   Component Value Date    WBC 8.01 12/05/2023    HGB 16.6 12/05/2023    HCT 49.4 12/05/2023    MCV 86 12/05/2023    MCH 29.0 12/05/2023    MCHC 33.6 12/05/2023    RDW 12.9 12/05/2023     12/05/2023    MPV 10.1 12/05/2023       Lab Results   Component Value Date    GLU 87 06/12/2025     Lab Results   Component Value Date    LDLCALC 103.8 06/12/2025     Lab Results   Component Value Date    TSH 1.009 12/16/2024     Lab Results   Component Value Date    CHOL 168 06/12/2025    CHOL 178 12/16/2024    CHOL 182 06/11/2024     Lab Results   Component Value Date    TRIG 71 06/12/2025    TRIG 62 12/16/2024    TRIG 74 06/11/2024     Lab Results   Component Value Date    HDL 50 06/12/2025    HDL 47 12/16/2024    HDL 45 06/11/2024     Lab Results   Component Value Date    LDLCALC 103.8 06/12/2025    LDLCALC 118.6 12/16/2024    LDLCALC 122.2 06/11/2024     No results found for: "NONHDLC"  Lab Results   Component Value Date    CHOLHDL 29.8 06/12/2025    CHOLHDL 26.4 12/16/2024    CHOLHDL 24.7 06/11/2024       The 10-year ASCVD risk score (Brad VALADEZ, et al., 2019) is: 20.5%    Values used to calculate the score:      Age: 74 years      Sex: Male      Is Non- : No      Diabetic: No      Tobacco smoker: No      Systolic Blood Pressure: 122 mmHg      Is BP treated: Yes      HDL Cholesterol: 50 mg/dL      Total Cholesterol: 168 mg/dL    Plan:     Assessment & Plan    Assessed urinary symptoms, noting occasional dribbling but no significant nocturia or other " concerning symptoms at this time.  Reviewed prostate health, noting normal PSA in past screenings.  Evaluated cardiovascular health, noting normal EKG and stress test results from 2018, with slight heart wall thickening possibly due to HTN.  Considered high-intensity workouts and age as potential indications for cardiac evaluation.  BP and cholesterol levels well-controlled.    PROSTATE AND URINARY SYMPTOMS:  - Monitored urinary symptoms, noting dribbling when urinating, but no frequency or nocturia.  - Prostate exam was normal based on evaluations from last year and six months ago.  - Explained age-related prostatic hyperplasia and its potential effects on urination.  - Referred patient to Urology for evaluation of occasional urinary dribbling.  - Discussed the slow-growing nature of most prostate cancers and current PSA testing recommendations, explaining the shift away from routine annual screening due to potential overdiagnosis and unnecessary procedures.  - Will consider PSA testing based on family history and current symptoms.  - Mr. Quijano instructed to monitor for any new urinary symptoms or changes in micturition patterns.    HYPERTENSION:  - Blood pressure is optimal today.  - Continue current antihypertensive medication regimen.    HYPERLIPIDEMIA:  - Cholesterol levels are at target goals.  - Continue current antihyperlipidemic medication.  - Ordered lipid panel.    CARDIOMEGALY AND CARDIAC HEALTH:  - Cardiac status shows negative stress test in 2018 and normal EKG.  - Mild ventricular hypertrophy noted, possibly secondary to hypertension or high-intensity exercise.  - Assessed need for cardiology consultation due to high-intensity workouts and age over 50.  - Provided information on indications for cardiac evaluation in older adults who engage in high-intensity exercise.  - Mr. Quijano instructed to monitor for potential cardiac symptoms such as dyspnea or chest discomfort during exercise.    HIP  PAIN:  - Hip pain has been treated for several years with injections every 2 to 2.5 years.  - X-rays show no progression or worsening of the hip condition.  - Recommend against surgical intervention as patient maintains functional ability to run and walk.    TINEA PEDIS:  - Mr. Quijano has been self-treating tinea pedis with antifungal spray.  - Prescribed antifungal cream for more effective treatment of athlete's foot.    FAMILY HISTORY:  - Mr. Quijano's brother  at age 69 or 70 from endocarditis.    GENERAL HEALTH AND FOLLOW-UP:  - Mr. Quijano to continue current exercise regimen and gym attendance.  - Ordered kidney and liver function tests.  - Follow up in 6 months for repeat labs.         1. Primary hypertension  - CBC Auto Differential; Future    2. Pure hypercholesterolemia    3. Benign prostatic hyperplasia, unspecified whether lower urinary tract symptoms present  - Ambulatory referral/consult to Urology; Future    4. Other long term (current) drug therapy  - CBC Auto Differential; Future  - Comprehensive Metabolic Panel; Future  - Hemoglobin A1C; Future  - Lipid Panel; Future    5. Tinea pedis, unspecified laterality  - clotrimazole (LOTRIMIN) 1 % cream; Apply topically 2 (two) times daily.  Dispense: 45 g; Refill: 0    Hypertension:  Chronic, stable, controlled with medication.  Continue Olmesartan 40 mg daily.    Hyperlipidemia:  Chronic, stable, continue Atorvastatin 20 mg daily.    Immunization History   Administered Date(s) Administered    COVID-19, MRNA, LN-S, PF (MODERNA FULL 0.5 ML DOSE) 2021, 2021    Influenza (FLUAD) - Quadrivalent - Adjuvanted - PF *Preferred* (65+) 2020, 2021    Influenza - Trivalent - Fluzone High Dose - PF (65 years and older) 2017, 2019    Pneumococcal Conjugate - 13 Valent 2017    Pneumococcal Polysaccharide - 23 Valent 2019    Zoster Recombinant 11/15/2019, 2020       Orders Placed This Encounter   Procedures     CBC Auto Differential    Comprehensive Metabolic Panel    Hemoglobin A1C    Lipid Panel    Ambulatory referral/consult to Urology       Portions of this note were generated by [employer].    Each patient to whom medical services by telemedicine are provided:  (1) informed of the relationship between the physician and patient and the respective role of any other health care provider with respect to management of the patient; and (2) notified that he or she may decline to receive medical services by telemedicine and may withdraw from such care at any time.    I spent a total of 35 minutes face to face and non-face to face on the date of this visit.This includes time preparing to see the patient (eg, review of tests, notes), obtaining and/or reviewing additional history from an independent historian and/or outside medical records, documenting clinical information in the electronic health record, independently interpreting results and/or communicating results to the patient/family/caregiver, or care coordinator.  Visit today included increased complexity associated with the care of the episodic problem addressed and managing the longitudinal care of the patient due to the serious and/or complex managed problem(s).      This note was generated with the assistance of ambient listening technology. Verbal consent was obtained by the patient and accompanying visitor(s) for the recording of patient appointment to facilitate this note. I attest to having reviewed and edited the generated note for accuracy, though some syntax or spelling errors may persist. Please contact the author of this note for any clarification.      Bita Salas MD         [1]   Current Outpatient Medications on File Prior to Visit   Medication Sig Dispense Refill    atorvastatin (LIPITOR) 20 MG tablet Take 1 tablet by mouth once daily in the evening. 90 tablet 1    olmesartan (BENICAR) 40 MG tablet Take 1 tablet (40 mg total) by mouth once daily. 90  tablet 3     No current facility-administered medications on file prior to visit.

## 2025-08-05 ENCOUNTER — PATIENT MESSAGE (OUTPATIENT)
Dept: UROLOGY | Facility: CLINIC | Age: 75
End: 2025-08-05

## 2025-08-05 ENCOUNTER — OFFICE VISIT (OUTPATIENT)
Dept: UROLOGY | Facility: CLINIC | Age: 75
End: 2025-08-05
Payer: MEDICARE

## 2025-08-05 VITALS
SYSTOLIC BLOOD PRESSURE: 133 MMHG | HEIGHT: 71 IN | DIASTOLIC BLOOD PRESSURE: 74 MMHG | WEIGHT: 213.88 LBS | HEART RATE: 70 BPM | BODY MASS INDEX: 29.94 KG/M2

## 2025-08-05 DIAGNOSIS — N52.9 ERECTILE DYSFUNCTION, UNSPECIFIED ERECTILE DYSFUNCTION TYPE: Primary | ICD-10-CM

## 2025-08-05 DIAGNOSIS — N40.1 BENIGN PROSTATIC HYPERPLASIA WITH NOCTURIA: ICD-10-CM

## 2025-08-05 DIAGNOSIS — R35.1 BENIGN PROSTATIC HYPERPLASIA WITH NOCTURIA: ICD-10-CM

## 2025-08-05 LAB
BILIRUBIN, UA POC OHS: NEGATIVE
BLOOD, UA POC OHS: NEGATIVE
CLARITY, UA POC OHS: CLEAR
COLOR, UA POC OHS: YELLOW
GLUCOSE, UA POC OHS: NEGATIVE
KETONES, UA POC OHS: NEGATIVE
LEUKOCYTES, UA POC OHS: NEGATIVE
NITRITE, UA POC OHS: NEGATIVE
PH, UA POC OHS: 5.5
PROTEIN, UA POC OHS: NEGATIVE
SPECIFIC GRAVITY, UA POC OHS: 1.01
UROBILINOGEN, UA POC OHS: 0.2

## 2025-08-05 PROCEDURE — 1101F PT FALLS ASSESS-DOCD LE1/YR: CPT | Mod: CPTII,S$GLB,, | Performed by: UROLOGY

## 2025-08-05 PROCEDURE — 1126F AMNT PAIN NOTED NONE PRSNT: CPT | Mod: CPTII,S$GLB,, | Performed by: UROLOGY

## 2025-08-05 PROCEDURE — 1159F MED LIST DOCD IN RCRD: CPT | Mod: CPTII,S$GLB,, | Performed by: UROLOGY

## 2025-08-05 PROCEDURE — 81003 URINALYSIS AUTO W/O SCOPE: CPT | Mod: QW,S$GLB,, | Performed by: UROLOGY

## 2025-08-05 PROCEDURE — 1160F RVW MEDS BY RX/DR IN RCRD: CPT | Mod: CPTII,S$GLB,, | Performed by: UROLOGY

## 2025-08-05 PROCEDURE — 3078F DIAST BP <80 MM HG: CPT | Mod: CPTII,S$GLB,, | Performed by: UROLOGY

## 2025-08-05 PROCEDURE — 99204 OFFICE O/P NEW MOD 45 MIN: CPT | Mod: S$GLB,,, | Performed by: UROLOGY

## 2025-08-05 PROCEDURE — 4010F ACE/ARB THERAPY RXD/TAKEN: CPT | Mod: CPTII,S$GLB,, | Performed by: UROLOGY

## 2025-08-05 PROCEDURE — 3288F FALL RISK ASSESSMENT DOCD: CPT | Mod: CPTII,S$GLB,, | Performed by: UROLOGY

## 2025-08-05 PROCEDURE — 3008F BODY MASS INDEX DOCD: CPT | Mod: CPTII,S$GLB,, | Performed by: UROLOGY

## 2025-08-05 PROCEDURE — 3075F SYST BP GE 130 - 139MM HG: CPT | Mod: CPTII,S$GLB,, | Performed by: UROLOGY

## 2025-08-05 PROCEDURE — 99999 PR PBB SHADOW E&M-EST. PATIENT-LVL III: CPT | Mod: PBBFAC,,, | Performed by: UROLOGY

## 2025-08-05 RX ORDER — TADALAFIL 10 MG/1
10 TABLET ORAL DAILY PRN
Qty: 30 TABLET | Refills: 5 | Status: SHIPPED | OUTPATIENT
Start: 2025-08-05 | End: 2026-08-05

## 2025-08-05 NOTE — PROGRESS NOTES
Chief Complaint:  Prostate cancer screening, BPH postvoid dribbling, ED    HPI:   Geremias Quijano is a 74 y.o. male that presents today as a referral from Dr. Salas for for the above issues.  Patient notes that concern over the fact that he does not believe he has ever had a PSA checked, on review he has had an annual PSA pretty much on schedule every years since 2012, all of which have been negative.  Last PSA 2.4 in December.  No family history of prostate cancer.  No real voiding issues, has some mild postvoid dribbling but nothing bothersome.  Denies nocturia.  Has a reasonable flow and feels like he empties.  Has some very mild ED, mostly issues with maintaining, not currently on any medications but is interested.  No hematuria or dysuria.  No prior urologic procedures.  IPSS - 1/2/0/0/1/0/2 = 6  QOL - 2(mostly satisfied)    PMH:  Past Medical History:   Diagnosis Date    History of colon polyps     HTN (hypertension)     Hyperlipemia        PSH:  Past Surgical History:   Procedure Laterality Date    COLONOSCOPY N/A 11/13/2015    Procedure: COLONOSCOPY;  Surgeon: Layo Hernandez III, MD;  Location: [facility];  Service: Endoscopy;  Laterality: N/A;    COLONOSCOPY N/A 8/29/2018    Procedure: COLONOSCOPY;  Surgeon: Layo Hernandez III, MD;  Location: [facility];  Service: Endoscopy;  Laterality: N/A;    COLONOSCOPY N/A 6/15/2023    Procedure: COLONOSCOPY;  Surgeon: Mirta Russo MD;  Location: [facility];  Service: Endoscopy;  Laterality: N/A;    NASAL SEPTUM SURGERY         Family History:  No family history on file.    Social History:  Social History[1]     Review of Systems:  General: No fever, chills  Skin: No rashes  Chest:  Denies cough and sputum production  Heart: Denies chest pain  Resp: Denies dyspnea  Abdomen: Denies diarrhea, abdominal pain, hematemesis, or blood in stool.  Musculoskeletal: No joint stiffness or swelling. Denies back pain.  : see HPI  Neuro: no dizziness or  weakness    Allergies:  Patient has no known allergies.    Medications:  Current Medications[2]    Physical Exam:  Vitals:    08/05/25 0859   BP: 133/74   Pulse: 70     Body mass index is 29.83 kg/m².  General: awake, alert, cooperative  Head: NC/AT  Ears: external ears normal  Eyes: sclera normal  Lungs: normal inspiration, NAD  Heart: well-perfused  Abdomen: Soft, NT, ND  : Normal circ'd phallus, meatus normal in size and position, BL testicles palpable, no masses, nontender, no abnormalities of epididymi  DARRYL: Normal rectal tone, no hemorrhoids. Prostate smooth and normal, no nodules 30 gm SV not palpable. Perineum and anus normal.  Lymphatic: groin nodes negative  Skin: The skin is warm and dry  Ext: No c/c/e.  Neuro: grossly intact, AOx3    RADIOLOGY:  No recent relevant imaging available for review.    LABS:  I personally reviewed the following lab values:  Lab Results   Component Value Date    WBC 8.01 12/05/2023    HGB 16.6 12/05/2023    HCT 49.4 12/05/2023     12/05/2023     06/12/2025    K 4.9 06/12/2025     06/12/2025    CREATININE 1.1 06/12/2025    BUN 20 06/12/2025    CO2 26 06/12/2025    TSH 1.009 12/16/2024    PSA 2.4 12/16/2024    CHOL 168 06/12/2025    TRIG 71 06/12/2025    HDL 50 06/12/2025    ALT 20 12/16/2024    AST 19 12/16/2024       URINALYSIS:  Urinalysis obtained in clinic today specific gravity 1.010 pH 5.5, negative for all parameters    PVR = 77 mL    Assessment/Plan:   Geremias Quijano is a 74 y.o. male with:    Prostate cancer screening - PSA and DARRYL normal, continue annual screening    ED - mild, wants to try something, Cialis Rx, take 5 mg P.r.n., side effects reviewed, follow-up six weeks for symptom check    Postvoid dribbling - not bothersome enough that he wants to take a medication, recommend milking the urethra    Thank you for allowing me the opportunity to participate in this patient's care.     Geremias Martinez MD  Urology       [1]   Social  History  Tobacco Use    Smoking status: Never    Smokeless tobacco: Never   Substance Use Topics    Alcohol use: No    Drug use: No   [2]   Current Outpatient Medications:     atorvastatin (LIPITOR) 20 MG tablet, Take 1 tablet by mouth once daily in the evening., Disp: 90 tablet, Rfl: 1    clotrimazole (LOTRIMIN) 1 % cream, Apply topically 2 (two) times daily., Disp: 45 g, Rfl: 0    olmesartan (BENICAR) 40 MG tablet, Take 1 tablet (40 mg total) by mouth once daily., Disp: 90 tablet, Rfl: 3    tadalafiL (CIALIS) 10 MG tablet, Take 1 tablet (10 mg total) by mouth daily as needed for Erectile Dysfunction., Disp: 30 tablet, Rfl: 5

## 2025-08-19 ENCOUNTER — PATIENT MESSAGE (OUTPATIENT)
Dept: INTERNAL MEDICINE | Facility: CLINIC | Age: 75
End: 2025-08-19
Payer: MEDICARE

## 2025-08-19 RX ORDER — ATORVASTATIN CALCIUM 20 MG/1
20 TABLET, FILM COATED ORAL NIGHTLY
Qty: 90 TABLET | Refills: 1 | Status: SHIPPED | OUTPATIENT
Start: 2025-08-19